# Patient Record
Sex: FEMALE | Race: WHITE | Employment: OTHER | URBAN - METROPOLITAN AREA
[De-identification: names, ages, dates, MRNs, and addresses within clinical notes are randomized per-mention and may not be internally consistent; named-entity substitution may affect disease eponyms.]

---

## 2018-05-16 ENCOUNTER — OFFICE VISIT (OUTPATIENT)
Dept: FAMILY MEDICINE CLINIC | Age: 58
End: 2018-05-16

## 2018-05-16 VITALS
WEIGHT: 287 LBS | TEMPERATURE: 98.1 F | BODY MASS INDEX: 45.04 KG/M2 | SYSTOLIC BLOOD PRESSURE: 138 MMHG | DIASTOLIC BLOOD PRESSURE: 83 MMHG | RESPIRATION RATE: 20 BRPM | OXYGEN SATURATION: 98 % | HEIGHT: 67 IN | HEART RATE: 72 BPM

## 2018-05-16 DIAGNOSIS — E66.01 OBESITY, MORBID (HCC): ICD-10-CM

## 2018-05-16 DIAGNOSIS — M19.90 ARTHRITIS: ICD-10-CM

## 2018-05-16 DIAGNOSIS — M62.830 MUSCLE SPASM OF BACK: ICD-10-CM

## 2018-05-16 DIAGNOSIS — Z00.00 HEALTH CARE MAINTENANCE: ICD-10-CM

## 2018-05-16 DIAGNOSIS — E55.9 HYPOVITAMINOSIS D: ICD-10-CM

## 2018-05-16 DIAGNOSIS — I10 HYPERTENSION GOAL BP (BLOOD PRESSURE) < 130/80: ICD-10-CM

## 2018-05-16 DIAGNOSIS — K21.9 GASTROESOPHAGEAL REFLUX DISEASE WITHOUT ESOPHAGITIS: ICD-10-CM

## 2018-05-16 DIAGNOSIS — E78.00 HYPERCHOLESTEROLEMIA: ICD-10-CM

## 2018-05-16 DIAGNOSIS — F41.9 ANXIETY AND DEPRESSION: ICD-10-CM

## 2018-05-16 DIAGNOSIS — R73.03 BORDERLINE DIABETES MELLITUS: ICD-10-CM

## 2018-05-16 DIAGNOSIS — R07.89 CHEST DISCOMFORT: ICD-10-CM

## 2018-05-16 DIAGNOSIS — F32.A ANXIETY AND DEPRESSION: ICD-10-CM

## 2018-05-16 DIAGNOSIS — Z13.29 SCREENING FOR THYROID DISORDER: ICD-10-CM

## 2018-05-16 DIAGNOSIS — Z11.59 NEED FOR HEPATITIS C SCREENING TEST: ICD-10-CM

## 2018-05-16 DIAGNOSIS — Z00.00 MEDICARE ANNUAL WELLNESS VISIT, INITIAL: Primary | ICD-10-CM

## 2018-05-16 RX ORDER — ROSUVASTATIN CALCIUM 5 MG/1
10 TABLET, COATED ORAL
Qty: 30 TAB | Refills: 3 | Status: SHIPPED | OUTPATIENT
Start: 2018-05-16 | End: 2018-07-06 | Stop reason: SDUPTHER

## 2018-05-16 RX ORDER — MELOXICAM 15 MG/1
15 TABLET ORAL DAILY
Qty: 30 TAB | Refills: 3 | Status: SHIPPED | OUTPATIENT
Start: 2018-05-16 | End: 2018-07-05 | Stop reason: SDUPTHER

## 2018-05-16 RX ORDER — OMEPRAZOLE 40 MG/1
40 CAPSULE, DELAYED RELEASE ORAL DAILY
Qty: 30 CAP | Refills: 3 | Status: SHIPPED | OUTPATIENT
Start: 2018-05-16 | End: 2018-08-02 | Stop reason: SDUPTHER

## 2018-05-16 RX ORDER — DICLOFENAC SODIUM 10 MG/G
2 GEL TOPICAL 4 TIMES DAILY
Qty: 2 G | Refills: 3 | Status: SHIPPED | OUTPATIENT
Start: 2018-05-16

## 2018-05-16 RX ORDER — LISINOPRIL 20 MG/1
20 TABLET ORAL DAILY
Qty: 30 TAB | Refills: 3 | Status: SHIPPED | OUTPATIENT
Start: 2018-05-16 | End: 2018-05-17

## 2018-05-16 RX ORDER — SERTRALINE HYDROCHLORIDE 50 MG/1
50 TABLET, FILM COATED ORAL DAILY
Qty: 30 TAB | Refills: 3 | Status: SHIPPED | OUTPATIENT
Start: 2018-05-16 | End: 2018-08-02 | Stop reason: SDUPTHER

## 2018-05-16 RX ORDER — SERTRALINE HYDROCHLORIDE 50 MG/1
TABLET, FILM COATED ORAL DAILY
COMMUNITY
End: 2018-05-16 | Stop reason: SDUPTHER

## 2018-05-16 RX ORDER — MELOXICAM 15 MG/1
15 TABLET ORAL DAILY
COMMUNITY
End: 2018-05-16 | Stop reason: SDUPTHER

## 2018-05-16 RX ORDER — TIZANIDINE 2 MG/1
TABLET ORAL
Qty: 20 TAB | Refills: 1 | Status: SHIPPED | OUTPATIENT
Start: 2018-05-16 | End: 2018-07-02 | Stop reason: SDUPTHER

## 2018-05-16 NOTE — MR AVS SNAPSHOT
Maria Dewitt [address] [facility] 
[phone] Patient: Phil Hannah MRN: [id number] :1960 Visit Information Date & Time Provider Department Dept. Phone Encounter #  
 2018 12:00 PM Amanda Chacko MD [facility] [id number] Follow-up Instructions Return 2-3 weeks, for follw up resulkts. Upcoming Health Maintenance Date Due Hepatitis C Screening 1960 Pneumococcal 19-64 Highest Risk (1 of 3 - PCV13) 1980 DTaP/Tdap/Td series (1 - Tdap) 1982 FOBT Q 1 YEAR AGE 50-75 2011 BREAST CANCER SCRN MAMMOGRAM 2011 MEDICARE YEARLY EXAM 3/28/2018 Influenza Age 5 to Adult 2018 PAP AKA CERVICAL CYTOLOGY 2019 Allergies as of 2018  Review Complete On: 2018 By: Amanda Chacko MD  
  
 Severity Noted Reaction Type Reactions Pcn [Penicillins] High 2010    Other (comments) \"pass out\" Current Immunizations  Never Reviewed No immunizations on file. Not reviewed this visit You Were Diagnosed With   
  
 Codes Comments Medicare annual wellness visit, initial    -  Primary ICD-10-CM: Z00.00 ICD-9-CM: V70.0 Hypovitaminosis D     ICD-10-CM: E55.9 ICD-9-CM: 268.9 Obesity, morbid ([facility] [address]     ICD-10-CM: E66.01 
ICD-9-CM: 278.01 Health care maintenance     ICD-10-CM: Z00.00 ICD-9-CM: V70.0 Gastroesophageal reflux disease without esophagitis     ICD-10-CM: K21.9 ICD-9-CM: 530.81 Hypercholesterolemia     ICD-10-CM: E78.00 ICD-9-CM: 272.0 Borderline diabetes mellitus     ICD-10-CM: R73.03 
ICD-9-CM: 790.29 Screening for thyroid disorder     ICD-10-CM: Z13.29 ICD-9-CM: V77.0 Need for hepatitis C screening test     ICD-10-CM: Z11.59 
ICD-9-CM: V73.89 Chest discomfort     ICD-10-CM: R07.89 ICD-9-CM: 786.59  Hypertension goal BP (blood pressure) < 130/80     ICD-10-CM: I10 
 ICD-9-CM: 401.9 Anxiety and depression     ICD-10-CM: F41.9, F32.9 ICD-9-CM: 300.00, 311 Arthritis     ICD-10-CM: M19.90 ICD-9-CM: 716.90 Vitals BP Pulse Temp Resp Height(growth percentile) Weight(growth percentile) 138/83 72 98.1 °F (36.7 °C) (Oral) 20 5' 7\" (1.702 m) 287 lb (130.2 kg) SpO2 BMI OB Status Smoking Status 98% 44.95 kg/m2 Menopause Never Smoker Vitals History BMI and BSA Data Body Mass Index Body Surface Area 44.95 kg/m 2 2.48 m 2 Preferred Pharmacy Pharm[facility] Name Phone [phone] Your Updated Medication List  
  
   
This list is accurate as of 5/16/18  1:20 PM.  Always use your most recent med list.  
  
  
  
  
 diclofenac 1 % Gel Commonly known as:  VOLTAREN Apply 2 g to affected area four (4) times daily. lisinopril 20 mg tablet Commonly known as:  Adela Laoz Take 1 Tab by mouth daily. meloxicam 15 mg tablet Commonly known as:  MOBIC Take 1 Tab by mouth daily. omeprazole 40 mg capsule Commonly known as:  PRILOSEC Take 1 Cap by mouth daily. rosuvastatin 5 mg tablet Commonly known as:  CRESTOR Take 2 Tabs by mouth nightly. sertraline 50 mg tablet Commonly known as:  ZOLOFT Take 1 Tab by mouth daily. Prescriptions Sent to Pharmacy Refills  
 sertraline (ZOLOFT) 50 mg tablet 3 Sig: Take 1 Tab by mouth daily. Class: Normal  
 Pharmacy: [facility] [address] XAVIER [address] [place] [address], [address] [place] [address] Ph #: [phone] Route: Oral  
 meloxicam (MOBIC) 15 mg tablet 3 Sig: Take 1 Tab by mouth daily. Class: Normal  
 [facility] [address] [place] [address], [address] [place] [address] Ph #: [phone] Route: Oral  
 diclofenac (VOLTAREN) 1 % gel 3 Sig: Apply 2 g to affected area four (4) times daily.   
 Class: Normal  
 Pharmacy: [address] [place] [address], [address] [place] [address] Ph #: [phone] Route: Topical  
 lisinopril (PRINIVIL, ZESTRIL) 20 mg tablet 3 Sig: Take 1 Tab by mouth daily. Class: Normal  
 Pharmacy: [address] [place] [address], [address] [place] [address] Ph #: [phone] Route: Oral  
 rosuvastatin (CRESTOR) 5 mg tablet 3 Sig: Take 2 Tabs by mouth nightly. Class: Normal  
 Pharmacy: [address] [place] [address], [address] [place] [address] Ph #: [phone] Route: Oral  
 omeprazole (PRILOSEC) 40 mg capsule 3 Sig: Take 1 Cap by mouth daily. Class: Normal  
 Pharmacy: [address] [place] [address], [address] [place] [address] Ph #: [phone] Route: Oral  
  
We Performed the Following AMB POC EKG ROUTINE W/ 12 LEADS, SCREEN () [ [facility]S] CBC WITH AUTOMATED DIFF [37487 CPT(R)] HEMOGLOBIN A1C WITH EAG [96014 CPT(R)] HEPATITIS C AB [88155 CPT(R)] LIPID PANEL [93696 CPT(R)] METABOLIC PANEL, COMPREHENSIVE [79077 CPT(R)] REFERRAL TO CARDIOLOGY [NPW14 Custom] Comments:  
 Recurrent chest discomfort with normal EKG. I have placed an order for stress test  
 REFERRAL TO GASTROENTEROLOGY [CCY86 Custom] TSH 3RD GENERATION [64784 CPT(R)] URINALYSIS W/ RFLX MICROSCOPIC [91089 CPT(R)] VITAMIN D, 25 HYDROXY [id number] CPT(R)] Follow-up Instructions Return 2-3 weeks, for follw up resulkts. To-Do List   
 05/16/2018 ECHO:  ECHO TTE STRESS COMP W OR WO CONTR   
  
 05/16/2018 ECHO:  ECHO TTE STRESS EXERCISE TREADMILL COMP   
  
 12/16/2018 Imaging:  ISIDRO MAMMO BI SCREENING INCL CAD Referral Information Referral ID Referred By Referred To  
  
 [id number] [facility]   
   [address] [place], [place] [address]ise [facility] [address] [facility], [address] Phone: [phone] Fax: [phone] Visits Status Start Date End Date 1 Authorized 5/16/18 5/16/19 If your referral has a status of pending review or denied, additional information will be sent to support the outcome of this decision. Referral ID Referred By Ref[facility] To  
 [id number]  
   [address] [facility] [address] [place] [address] Ne, [address] Phone: [phone] Fax: [phone] Visits Status Start Date End Date 1 Authorized 5/16/18 5/16/19 If your referral has a status of pending review or denied, additional information will be sent to support the outcome of this decision. Patient Instructions Well Visit, Women 48 to 72: Care Instructions Your Care Instructions Physical exams can help you stay healthy. Your doctor has checked your overall health and may have suggested ways to take good care of yourself. He or she also may have recommended tests. At home, you can help prevent illness with healthy eating, regular exercise, and other steps. Follow-up care is a key part of your treatment and safety. Be sure to make and go to all appointments, and call your doctor if you are having problems. It's also a good idea to know your test results and keep a list of the medicines you take. How can you care for yourself at home? · Reach and stay at a healthy weight. This will lower your risk for many problems, such as obesity, diabetes, heart disease, and high blood pressure. · Get at least 30 minutes of exercise on most days of the week. Walking is a good choice. You also may want to do other activities, such as running, swimming, cycling, or playing tennis or team sports. · Do not smoke. Smoking can make health problems worse. If you need help quitting, talk to your doctor about stop-smoking programs and medicines. These can increase your chances of quitting for good. · Protect your skin from too much sun.  When you're outdoors from 10 a.m. to 4 p.m., stay in the shade or cover up with clothing and a hat with a wide brim. Wear sunglasses that block UV rays. Even when it's cloudy, put broad-spectrum sunscreen (SPF 30 or higher) on any exposed skin. · See a dentist one or two times a year for checkups and to have your teeth cleaned. · Wear a seat belt in the car. · Limit alcohol to 1 drink a day. Too much alcohol can cause health problems. Follow your doctor's advice about when to have certain tests. These tests can spot problems early. · Cholesterol. Your doctor will tell you how often to have this done based on your age, family history, or other things that can increase your risk for heart attack and stroke. · Blood pressure. Have your blood pressure checked during a routine doctor visit. Your doctor will tell you how often to check your blood pressure based on your age, your blood pressure results, and other factors. · Mammogram. Ask your doctor how often you should have a mammogram, which is an X-ray of your breasts. A mammogram can spot breast cancer before it can be felt and when it is easiest to treat. · Pap test and pelvic exam. Ask your doctor how often you should have a Pap test. You may not need to have a Pap test as often as you used to. · Vision. Have your eyes checked every year or two or as often as your doctor suggests. Some experts recommend that you have yearly exams for glaucoma and other age-related eye problems starting at age 48. · Hearing. Tell your doctor if you notice any change in your hearing. You can have tests to find out how well you hear. · Diabetes. Ask your doctor whether you should have tests for diabetes. · Colon cancer. You should begin tests for colon cancer at age 48. You may have one of several tests. Your doctor will tell you how often to have tests based on your age and risk. Risks include whether you already had a precancerous polyp removed from your colon or whether your parents, sisters and brothers, or children have had colon cancer. · Thyroid disease. Talk to your doctor about whether to have your thyroid checked as part of a regular physical exam. Women have an increased chance of a thyroid problem. · Osteoporosis. You should begin tests for bone density at age 72. If you are younger than 72, ask your doctor whether you have factors that may increase your risk for this disease. You may want to have this test before age 72. · Heart attack and stroke risk. At least every 4 to 6 years, you should have your risk for heart attack and stroke assessed. Your doctor uses factors such as your age, blood pressure, cholesterol, and whether you smoke or have diabetes to show what your risk for a heart attack or stroke is over the next 10 years. When should you call for help? Watch closely for changes in your health, and be sure to contact your doctor if you have any problems or symptoms that concern you. Where can you learn more? Go to http://dani-benson.info/. Enter F332 in the search box to learn more about \"Well Visit, Women 50 to 72: Care Instructions. \" Current as of: May 12, 2017 Content Version: 11.4 © 9573-4632 [employer]. Care instructions adapted under license by [employer] (which disclaims liability or warranty for this information). If you have questions about a medical condition or this instruction, always ask your healthcare professional. [place] [address] any warranty or liability for your use of this information. Introducing [facility]! Dear Christianna Carrel: 
Thank you for requesting a [employer] account. Our records indicate that you have previously registered for a [employer] account but its currently inactive. Please call our [employer] support line at [phone]. Additional Information If you have questions, please visit the Frequently Asked Questions section of the [employer] website at [email or web address] Remember, MyChart is NOT to be used for urgent needs. For medical emergencies, dial 911. Now available from your iPhone and Android! Please provide this summary of care documentation to your next provider. Your primary care clinician is listed as Olamide Wilson. If you have any questions after today's visit, please call [phone].

## 2018-05-16 NOTE — PATIENT INSTRUCTIONS
Well Visit, Women 48 to 72: Care Instructions  Your Care Instructions    Physical exams can help you stay healthy. Your doctor has checked your overall health and may have suggested ways to take good care of yourself. He or she also may have recommended tests. At home, you can help prevent illness with healthy eating, regular exercise, and other steps. Follow-up care is a key part of your treatment and safety. Be sure to make and go to all appointments, and call your doctor if you are having problems. It's also a good idea to know your test results and keep a list of the medicines you take. How can you care for yourself at home? · Reach and stay at a healthy weight. This will lower your risk for many problems, such as obesity, diabetes, heart disease, and high blood pressure. · Get at least 30 minutes of exercise on most days of the week. Walking is a good choice. You also may want to do other activities, such as running, swimming, cycling, or playing tennis or team sports. · Do not smoke. Smoking can make health problems worse. If you need help quitting, talk to your doctor about stop-smoking programs and medicines. These can increase your chances of quitting for good. · Protect your skin from too much sun. When you're outdoors from 10 a.m. to 4 p.m., stay in the shade or cover up with clothing and a hat with a wide brim. Wear sunglasses that block UV rays. Even when it's cloudy, put broad-spectrum sunscreen (SPF 30 or higher) on any exposed skin. · See a dentist one or two times a year for checkups and to have your teeth cleaned. · Wear a seat belt in the car. · Limit alcohol to 1 drink a day. Too much alcohol can cause health problems. Follow your doctor's advice about when to have certain tests. These tests can spot problems early. · Cholesterol.  Your doctor will tell you how often to have this done based on your age, family history, or other things that can increase your risk for heart attack and stroke. · Blood pressure. Have your blood pressure checked during a routine doctor visit. Your doctor will tell you how often to check your blood pressure based on your age, your blood pressure results, and other factors. · Mammogram. Ask your doctor how often you should have a mammogram, which is an X-ray of your breasts. A mammogram can spot breast cancer before it can be felt and when it is easiest to treat. · Pap test and pelvic exam. Ask your doctor how often you should have a Pap test. You may not need to have a Pap test as often as you used to. · Vision. Have your eyes checked every year or two or as often as your doctor suggests. Some experts recommend that you have yearly exams for glaucoma and other age-related eye problems starting at age 48. · Hearing. Tell your doctor if you notice any change in your hearing. You can have tests to find out how well you hear. · Diabetes. Ask your doctor whether you should have tests for diabetes. · Colon cancer. You should begin tests for colon cancer at age 48. You may have one of several tests. Your doctor will tell you how often to have tests based on your age and risk. Risks include whether you already had a precancerous polyp removed from your colon or whether your parents, sisters and brothers, or children have had colon cancer. · Thyroid disease. Talk to your doctor about whether to have your thyroid checked as part of a regular physical exam. Women have an increased chance of a thyroid problem. · Osteoporosis. You should begin tests for bone density at age 72. If you are younger than 72, ask your doctor whether you have factors that may increase your risk for this disease. You may want to have this test before age 72. · Heart attack and stroke risk. At least every 4 to 6 years, you should have your risk for heart attack and stroke assessed.  Your doctor uses factors such as your age, blood pressure, cholesterol, and whether you smoke or have diabetes to show what your risk for a heart attack or stroke is over the next 10 years. When should you call for help? Watch closely for changes in your health, and be sure to contact your doctor if you have any problems or symptoms that concern you. Where can you learn more? Go to http://dani-benson.info/. Enter P830 in the search box to learn more about \"Well Visit, Women 50 to 72: Care Instructions. \"  Current as of: May 12, 2017  Content Version: 11.4  © 9051-4924 [employer]. Care instructions adapted under license by [employer] (which disclaims liability or warranty for this information). If you have questions about a medical condition or this instruction, always ask your healthcare professional. [address] any warranty or liability for your use of this information.

## 2018-05-16 NOTE — PROGRESS NOTES
Chief Complaint   Patient presents with   [employer] Establish Care     medicare wellness exam     HPI:  This is an Initial Medicare Annual Wellness Exam (AWV) (Performed 12 months after IPPE or effective date of Medicare Part B enrollment, Once in a lifetime)  I have reviewed the patient's medical history in detail and updated the computerized patient record. History     Past Medical History:   Diagnosis Date    Cancer [facility]) 1986    cervical    GERD (gastroesophageal reflux disease)     Hypertension     Ill-defined condition     Fibromyalgia      Past Surgical History:   Procedure Laterality Date    HX GYN      cone resection     Current Outpatient Prescriptions   Medication Sig Dispense Refill    sertraline (ZOLOFT) 50 mg tablet Take  by mouth daily.  meloxicam (MOBIC) 15 mg tablet Take 15 mg by mouth daily.  diclofenac (VOLTAREN) 1 % gel Apply 2 g to affected area four (4) times daily.  lisinopril (PRINIVIL, ZESTRIL) 20 mg tablet 20 mg daily.  CRESTOR 5 mg tablet 10 mg nightly.  OMEPRAZOLE PO Take 40 mg by mouth daily. Allergies   Allergen Reactions    Pcn [Penicillins] Other (comments)     \"pass out\"     Family History   Problem Relation Age of Onset    Hypertension Mother     Heart Disease Father     Other Sister      FMS     Social History   Substance Use Topics    Smoking status: Never Smoker    Smokeless tobacco: Never Used    Alcohol use 1.5 oz/week     3 Cans of beer per week      Comment: socially     Patient Active Problem List   Diagnosis Code    Cancer ([facility] C80.1    Plantar fasciitis M72.2    Hypovitaminosis D E55.9    Obesity, morbid ([facility] E66.01    Gastroesophageal reflux disease without esophagitis K21.9       Depression Risk Factor Screening:   No flowsheet data found. Alcohol Risk Factor Screening: You do not drink alcohol or very rarely. Functional Ability and Level of Safety:     Hearing Loss  Hearing is good.     Activities of Daily Living  The home contains: no safety equipment. Patient does total self care    Fall Risk  No flowsheet data found. Abuse Screen  Patient is not abused    Cognitive Screening   Evaluation of Cognitive Function:  Has your family/caregiver stated any concerns about your memory: no  Normal    Patient Care Team   Patient Care Team:  None as PCP - General    Assessment/Plan   Education and counseling provided:  Are appropriate based on today's review and evaluation  Screening Mammography  Cardiovascular screening blood test  Diabetes screening test  Diagnoses and all orders for this visit:    Medicare annual wellness visit, initial  -     URINALYSIS W/ RFLX MICROSCOPIC  -     METABOLIC PANEL, COMPREHENSIVE  -     CBC WITH AUTOMATED DIFF  -     AMB POC EKG ROUTINE W/ 12 LEADS, SCREEN ()    Hypovitaminosis D  -     VITAMIN D, 25 HYDROXY    Obesity, morbid ([address]  -     LIPID PANEL    Health care maintenance  -     [facility] MAMMO BI SCREENING INCL CAD; Future    Gastroesophageal reflux disease without esophagitis  -     omeprazole (PRILOSEC) 40 mg capsule; Take 1 Cap by mouth daily. , Normal, Disp-30 Cap, R-3    Hypercholesterolemia  -     LIPID PANEL  -     rosuvastatin (CRESTOR) 5 mg tablet; Take 2 Tabs by mouth nightly., Normal, Disp-30 Tab, R-3    Borderline diabetes mellitus  -     METABOLIC PANEL, COMPREHENSIVE  -     HEMOGLOBIN A1C WITH EAG    Screening for thyroid disorder  -     TSH 3RD GENERATION  -     [facility]    Need for hepatitis C screening test  -     HEPATITIS C AB    Chest discomfort  -     ECHO TTE STRESS EXERCISE TREADMILL COMP; Future  -     ECHO TTE STRESS EXRCSE COMP W OR WO CONTR; Future  -     [facility]    Hypertension goal BP (blood pressure) < 130/80  -     lisinopril (PRINIVIL, ZESTRIL) 20 mg tablet; Take 1 Tab by mouth daily. , Normal, Disp-30 Tab, R-3    Anxiety and depression  -     sertraline (ZOLOFT) 50 mg tablet; Take 1 Tab by mouth daily. , Normal, Disp-30 Tab, R-3    Arthritis  - meloxicam (MOBIC) 15 mg tablet; Take 1 Tab by mouth daily. , Normal, Disp-30 Tab, R-3  -     diclofenac (VOLTAREN) 1 % gel; Apply 2 g to affected area four (4) times daily. , Normal, Disp-2 g, R-3    Muscle spasm of back  -     tiZANidine (ZANAFLEX) 2 mg tablet; 1 tab every 8 hours as needed, Normal, Disp-20 Tab, R-1      Patient Instructions        Well Visit, Women 50 to 72: Care Instructions  Your Care Instructions    Physical exams can help you stay healthy. Your doctor has checked your overall health and may have suggested ways to take good care of yourself. He or she also may have recommended tests. At home, you can help prevent illness with healthy eating, regular exercise, and other steps. Follow-up care is a key part of your treatment and safety. Be sure to make and go to all appointments, and call your doctor if you are having problems. It's also a good idea to know your test results and keep a list of the medicines you take. How can you care for yourself at home? · Reach and stay at a healthy weight. This will lower your risk for many problems, such as obesity, diabetes, heart disease, and high blood pressure. · Get at least 30 minutes of exercise on most days of the week. Walking is a good choice. You also may want to do other activities, such as running, swimming, cycling, or playing tennis or team sports. · Do not smoke. Smoking can make health problems worse. If you need help quitting, talk to your doctor about stop-smoking programs and medicines. These can increase your chances of quitting for good. · Protect your skin from too much sun. When you're outdoors from 10 a.m. to 4 p.m., stay in the shade or cover up with clothing and a hat with a wide brim. Wear sunglasses that block UV rays. Even when it's cloudy, put broad-spectrum sunscreen (SPF 30 or higher) on any exposed skin. · See a dentist one or two times a year for checkups and to have your teeth cleaned.   · Wear a seat belt in the car.  · Limit alcohol to 1 drink a day. Too much alcohol can cause health problems. Follow your doctor's advice about when to have certain tests. These tests can spot problems early. · Cholesterol. Your doctor will tell you how often to have this done based on your age, family history, or other things that can increase your risk for heart attack and stroke. · Blood pressure. Have your blood pressure checked during a routine doctor visit. Your doctor will tell you how often to check your blood pressure based on your age, your blood pressure results, and other factors. · Mammogram. Ask your doctor how often you should have a mammogram, which is an X-ray of your breasts. A mammogram can spot breast cancer before it can be felt and when it is easiest to treat. · Pap test and pelvic exam. Ask your doctor how often you should have a Pap test. You may not need to have a Pap test as often as you used to. · Vision. Have your eyes checked every year or two or as often as your doctor suggests. Some experts recommend that you have yearly exams for glaucoma and other age-related eye problems starting at age 48. · Hearing. Tell your doctor if you notice any change in your hearing. You can have tests to find out how well you hear. · Diabetes. Ask your doctor whether you should have tests for diabetes. · Colon cancer. You should begin tests for colon cancer at age 48. You may have one of several tests. Your doctor will tell you how often to have tests based on your age and risk. Risks include whether you already had a precancerous polyp removed from your colon or whether your parents, sisters and brothers, or children have had colon cancer. · Thyroid disease. Talk to your doctor about whether to have your thyroid checked as part of a regular physical exam. Women have an increased chance of a thyroid problem. · Osteoporosis. You should begin tests for bone density at age 72.  If you are younger than 72, ask your doctor whether you have factors that may increase your risk for this disease. You may want to have this test before age 72. · Heart attack and stroke risk. At least every 4 to 6 years, you should have your risk for heart attack and stroke assessed. Your doctor uses factors such as your age, blood pressure, cholesterol, and whether you smoke or have diabetes to show what your risk for a heart attack or stroke is over the next 10 years. When should you call for help? Watch closely for changes in your health, and be sure to contact your doctor if you have any problems or symptoms that concern you. Where can you learn more? Go to http://dani-benson.info/. Enter L208 in the search box to learn more about \"Well Visit, Women 50 to 72: Care Instructions. \"  Current as of: May 12, 2017  Content Version: 11.4  © 5970-3848 Health[employer], [employer]. Care instructions adapted under license by [employer] (which disclaims liability or warranty for this information). If you have questions about a medical condition or this instruction, always ask your healthcare professional. [place] [address] any warranty or liability for your use of this information. Follow-up Disposition:  Return 2-3 weeks, for follw up resulkts.

## 2018-05-17 ENCOUNTER — TELEPHONE (OUTPATIENT)
Dept: FAMILY MEDICINE CLINIC | Age: 58
End: 2018-05-17

## 2018-05-17 LAB
25(OH)D3+25(OH)D2 SERPL-MCNC: 20.3 NG/ML (ref 30–100)
ALBUMIN SERPL-MCNC: 4.5 G/DL (ref 3.5–5.5)
ALBUMIN/GLOB SERPL: 1.6 {RATIO} (ref 1.2–2.2)
ALP SERPL-CCNC: 102 IU/L (ref 39–117)
ALT SERPL-CCNC: 17 IU/L (ref 0–32)
APPEARANCE UR: CLEAR
AST SERPL-CCNC: 21 IU/L (ref 0–40)
BASOPHILS # BLD AUTO: 0 X10E3/UL (ref 0–0.2)
BASOPHILS NFR BLD AUTO: 1 %
BILIRUB SERPL-MCNC: 0.4 MG/DL (ref 0–1.2)
BILIRUB UR QL STRIP: NEGATIVE
BUN SERPL-MCNC: 19 MG/DL (ref 6–24)
BUN/CREAT SERPL: 19 (ref 9–23)
CALCIUM SERPL-MCNC: 9.6 MG/DL (ref 8.7–10.2)
CHLORIDE SERPL-SCNC: 100 MMOL/L (ref 96–106)
CHOLEST SERPL-MCNC: 195 MG/DL (ref 100–199)
CO2 SERPL-SCNC: 27 MMOL/L (ref 18–29)
COLOR UR: YELLOW
CREAT SERPL-MCNC: 0.99 MG/DL (ref 0.57–1)
EOSINOPHIL # BLD AUTO: 0.1 X10E3/UL (ref 0–0.4)
EOSINOPHIL NFR BLD AUTO: 2 %
ERYTHROCYTE [DISTWIDTH] IN BLOOD BY AUTOMATED COUNT: 16 % (ref 12.3–15.4)
EST. AVERAGE GLUCOSE BLD GHB EST-MCNC: 117 MG/DL
GFR SERPLBLD CREATININE-BSD FMLA CKD-EPI: 63 ML/MIN/1.73
GFR SERPLBLD CREATININE-BSD FMLA CKD-EPI: 73 ML/MIN/1.73
GLOBULIN SER CALC-MCNC: 2.9 G/DL (ref 1.5–4.5)
GLUCOSE SERPL-MCNC: 88 MG/DL (ref 65–99)
GLUCOSE UR QL: NEGATIVE
HBA1C MFR BLD: 5.7 % (ref 4.8–5.6)
HCT VFR BLD AUTO: 39.2 % (ref 34–46.6)
HCV AB S/CO SERPL IA: <0.1 S/CO RATIO (ref 0–0.9)
HDLC SERPL-MCNC: 81 MG/DL
HGB BLD-MCNC: 12.7 G/DL (ref 11.1–15.9)
HGB UR QL STRIP: NEGATIVE
IMM GRANULOCYTES # BLD: 0 X10E3/UL (ref 0–0.1)
IMM GRANULOCYTES NFR BLD: 0 %
KETONES UR QL STRIP: NEGATIVE
LDLC SERPL CALC-MCNC: 93 MG/DL (ref 0–99)
LEUKOCYTE ESTERASE UR QL STRIP: NEGATIVE
LYMPHOCYTES # BLD AUTO: 1.6 X10E3/UL (ref 0.7–3.1)
LYMPHOCYTES NFR BLD AUTO: 27 %
MCH RBC QN AUTO: 27.8 PG (ref 26.6–33)
MCHC RBC AUTO-ENTMCNC: 32.4 G/DL (ref 31.5–35.7)
MCV RBC AUTO: 86 FL (ref 79–97)
MICRO URNS: NORMAL
MONOCYTES # BLD AUTO: 0.6 X10E3/UL (ref 0.1–0.9)
MONOCYTES NFR BLD AUTO: 10 %
NEUTROPHILS # BLD AUTO: 3.6 X10E3/UL (ref 1.4–7)
NEUTROPHILS NFR BLD AUTO: 60 %
NITRITE UR QL STRIP: NEGATIVE
PH UR STRIP: 7.5 [PH] (ref 5–7.5)
PLATELET # BLD AUTO: 287 X10E3/UL (ref 150–379)
POTASSIUM SERPL-SCNC: 4.1 MMOL/L (ref 3.5–5.2)
PROT SERPL-MCNC: 7.4 G/DL (ref 6–8.5)
PROT UR QL STRIP: NEGATIVE
RBC # BLD AUTO: 4.57 X10E6/UL (ref 3.77–5.28)
SODIUM SERPL-SCNC: 144 MMOL/L (ref 134–144)
SP GR UR: 1.02 (ref 1–1.03)
TRIGL SERPL-MCNC: 104 MG/DL (ref 0–149)
TSH SERPL DL<=0.005 MIU/L-ACNC: 2.47 UIU/ML (ref 0.45–4.5)
UROBILINOGEN UR STRIP-MCNC: 0.2 MG/DL (ref 0.2–1)
VLDLC SERPL CALC-MCNC: 21 MG/DL (ref 5–40)
WBC # BLD AUTO: 6 X10E3/UL (ref 3.4–10.8)

## 2018-05-17 NOTE — TELEPHONE ENCOUNTER
----- Message from Latrice Siddiqui sent at 5/17/2018  9:41 AM EDT -----  Regarding: Dr Bonilla/telephone  Pt said the wrong Lisinopril dosage was send over, it is supposed to be Lisinopril/HCTZ 20/25mg  into [facility], pt can be reach at [phone].

## 2018-05-17 NOTE — TELEPHONE ENCOUNTER
Pls call Janette Hands  She needs more info b/f scheduling pt visit       Ed Siegel  [address] [place] [address]  Phone: [phone]    ext 160  Fax: [phone]

## 2018-05-18 RX ORDER — LISINOPRIL AND HYDROCHLOROTHIAZIDE 12.5; 2 MG/1; MG/1
1 TABLET ORAL DAILY
Qty: 90 TAB | Refills: 1 | Status: SHIPPED | OUTPATIENT
Start: 2018-05-18 | End: 2018-07-05 | Stop reason: CLARIF

## 2018-05-21 ENCOUNTER — DOCUMENTATION ONLY (OUTPATIENT)
Dept: FAMILY MEDICINE CLINIC | Age: 58
End: 2018-05-21

## 2018-06-25 DIAGNOSIS — E55.9 HYPOVITAMINOSIS D: Primary | ICD-10-CM

## 2018-06-25 RX ORDER — CHOLECALCIFEROL TAB 125 MCG (5000 UNIT) 125 MCG
5000 TAB ORAL DAILY
Qty: 90 TAB | Refills: 1 | Status: SHIPPED | OUTPATIENT
Start: 2018-06-25 | End: 2018-07-05 | Stop reason: SDUPTHER

## 2018-06-25 RX ORDER — CHOLECALCIFEROL TAB 125 MCG (5000 UNIT) 125 MCG
5000 TAB ORAL DAILY
Qty: 90 TAB | Refills: 1 | Status: SHIPPED | OUTPATIENT
Start: 2018-06-25 | End: 2018-08-02 | Stop reason: SDUPTHER

## 2018-06-26 ENCOUNTER — HOSPITAL ENCOUNTER (OUTPATIENT)
Dept: MAMMOGRAPHY | Age: 58
Discharge: HOME | End: 2018-06-26
Attending: INTERNAL MEDICINE
Payer: MEDICARE

## 2018-06-26 ENCOUNTER — DOCUMENTATION ONLY (OUTPATIENT)
Dept: FAMILY MEDICINE CLINIC | Age: 58
End: 2018-06-26

## 2018-06-26 DIAGNOSIS — Z12.31 VISIT FOR SCREENING MAMMOGRAM: ICD-10-CM

## 2018-06-26 PROCEDURE — 77067 SCR MAMMO BI INCL CAD: CPT

## 2018-06-27 NOTE — TELEPHONE ENCOUNTER
The correct lisinopril/hctz have been sent to the pharmacy per . hard copy, drawn during this pregnancy

## 2018-07-02 DIAGNOSIS — M62.830 MUSCLE SPASM OF BACK: ICD-10-CM

## 2018-07-02 RX ORDER — TIZANIDINE 2 MG/1
TABLET ORAL
Qty: 20 TAB | Refills: 1 | Status: SHIPPED | OUTPATIENT
Start: 2018-07-02 | End: 2018-08-02 | Stop reason: SDUPTHER

## 2018-07-03 DIAGNOSIS — M19.90 ARTHRITIS: ICD-10-CM

## 2018-07-03 DIAGNOSIS — E78.00 HYPERCHOLESTEROLEMIA: ICD-10-CM

## 2018-07-05 ENCOUNTER — OFFICE VISIT (OUTPATIENT)
Dept: CARDIOLOGY CLINIC | Age: 58
End: 2018-07-05

## 2018-07-05 VITALS
DIASTOLIC BLOOD PRESSURE: 90 MMHG | SYSTOLIC BLOOD PRESSURE: 122 MMHG | HEART RATE: 97 BPM | OXYGEN SATURATION: 98 % | BODY MASS INDEX: 45.12 KG/M2 | WEIGHT: 287.5 LBS | HEIGHT: 67 IN | RESPIRATION RATE: 16 BRPM

## 2018-07-05 DIAGNOSIS — K21.9 GASTROESOPHAGEAL REFLUX DISEASE WITHOUT ESOPHAGITIS: ICD-10-CM

## 2018-07-05 DIAGNOSIS — I10 ESSENTIAL HYPERTENSION: ICD-10-CM

## 2018-07-05 DIAGNOSIS — Z76.89 ENCOUNTER TO ESTABLISH CARE: ICD-10-CM

## 2018-07-05 DIAGNOSIS — M72.2 PLANTAR FASCIITIS: ICD-10-CM

## 2018-07-05 DIAGNOSIS — E66.01 OBESITY, MORBID (HCC): ICD-10-CM

## 2018-07-05 DIAGNOSIS — R07.89 CHEST TIGHTNESS: Primary | ICD-10-CM

## 2018-07-05 DIAGNOSIS — E78.5 HYPERLIPIDEMIA, UNSPECIFIED HYPERLIPIDEMIA TYPE: ICD-10-CM

## 2018-07-05 RX ORDER — ROSUVASTATIN CALCIUM 20 MG/1
20 TABLET, COATED ORAL DAILY
COMMUNITY
Start: 2018-06-07 | End: 2018-07-05 | Stop reason: SDUPTHER

## 2018-07-05 RX ORDER — ASPIRIN 81 MG/1
81 TABLET ORAL DAILY
Qty: 30 TAB | Refills: 11
Start: 2018-07-05 | End: 2019-02-12

## 2018-07-05 RX ORDER — METOPROLOL TARTRATE 25 MG/1
12.5 TABLET, FILM COATED ORAL 2 TIMES DAILY
Qty: 90 TAB | Refills: 1 | Status: SHIPPED | OUTPATIENT
Start: 2018-07-05 | End: 2018-07-23

## 2018-07-05 RX ORDER — LISINOPRIL AND HYDROCHLOROTHIAZIDE 20; 25 MG/1; MG/1
TABLET ORAL
COMMUNITY
Start: 2018-06-22 | End: 2018-08-02 | Stop reason: SDUPTHER

## 2018-07-05 NOTE — MR AVS SNAPSHOT
[address] [facility] 
[phone] Patient: Esmer Miguel MRN: [id number] :1960 Visit Information Date & Time Provider Department Dept. Phone Encounter #  
 2018 10:30 AM Stefanie Thornton, [address] [facility] [phone] [id number] Follow-up Instructions Routing History Follow-up and Disposition History Your Appointments 2018 12:30 PM  
ECHO CARDIOGRAMS 2D with [address]  [facility] [address]) Appt Note: per Dr. Rina Corado, 2day rahul, [phone] waiting on order  echo b4 NUC  [address] [address] [facility]  
[phone] [address] [facility] [address] [address]  
  
    
 2018  1:30 PM  
NUCLEAR MEDICINE with NUCLEAR, [facility] [address]) Appt Note: per Dr. Rina Corado, 2day rahul, 9911 0053 waiting on order  echo b4 NUC  [address] [address] [facility]  
[phone] [address] [place] [facility] Upcoming Health Maintenance Date Due Pneumococcal 19-64 Highest Risk (1 of 3 - PCV13) 1980 DTaP/Tdap/Td series (1 - Tdap) 1982 FOBT Q 1 YEAR AGE 50-75 2011 Influenza Age 5 to Adult 2018 PAP AKA CERVICAL CYTOLOGY 2019 MEDICARE YEARLY EXAM 2019 BREAST CANCER SCRN MAMMOGRAM 2020 Allergies as of 2018  Review Complete On: 2018 By: Stefanie Thornton MD  
  
 Severity Noted Reaction Type Reactions Pcn [Penicillins] High 2010    Other (comments) \"pass out\" Current Immunizations  Never Reviewed No immunizations on file. Not reviewed this visit You Were Diagnosed With   
  
 Codes Comments Chest tightness    -  Primary ICD-10-CM: R07.89 ICD-9-CM: 786.59 Hyperlipidemia, unspecified hyperlipidemia type     ICD-10-CM: E78.5 ICD-9-CM: 272.4 Gastroesophageal reflux disease without esophagitis     ICD-10-CM: K21.9 ICD-9-CM: 530.81 Obesity, morbid ([facility]     ICD-10-CM: E66.01 
ICD-9-CM: 278.01 Essential hypertension     ICD-10-CM: I10 
ICD-9-CM: 401.9 Encounter to establish care     ICD-10-CM: Z76.89 
ICD-9-CM: V65.8 Vitals BP Pulse Resp Height(growth percentile) Weight(growth percentile) SpO2  
 122/90 (BP 1 Location: Right arm, BP Patient Position: Sitting) 97 16 5' 7\" (1.702 m) 287 lb 8 oz (130.4 kg) 98% BMI OB Status Smoking Status 45.03 kg/m2 Menopause Never Smoker Vitals History BMI and BSA Data Body Mass Index Body Surface Area 45.03 kg/m 2 2.48 m 2 Preferred Pharmacy Pharmacy Name Phone [address] [address], [place] [phone] Your Updated Medication List  
  
   
This list is accurate as of 7/5/18 12:01 PM.  Always use your most recent med list.  
  
  
  
  
 aspirin delayed-release 81 mg tablet Take 1 Tab by mouth daily. cholecalciferol (VITAMIN D3) 5,000 unit Tab tablet Commonly known as:  VITAMIN D3 Take 1 Tab by mouth daily. diclofenac 1 % Gel Commonly known as:  VOLTAREN Apply 2 g to affected area four (4) times daily. * lisinopril-hydroCHLOROthiazide 20-12.5 mg per tablet Commonly known as:  Birder Haste Take 1 Tab by mouth daily. * lisinopril-hydroCHLOROthiazide 20-25 mg per tablet Commonly known as:  PRINZIDE, ZESTORETIC  
  
 meloxicam 15 mg tablet Commonly known as:  MOBIC Take 1 Tab by mouth daily. metoprolol tartrate 25 mg tablet Commonly known as:  LOPRESSOR Take 0.5 Tabs by mouth two (2) times a day. To help prevent chest pain  
  
 omeprazole 40 mg capsule Commonly known as:  PRILOSEC Take 1 Cap by mouth daily. * rosuvastatin 5 mg tablet Commonly known as:  CRESTOR Take 2 Tabs by mouth nightly. * rosuvastatin 20 mg tablet Commonly known as:  CRESTOR Take 20 mg by mouth daily. sertraline 50 mg tablet Commonly known as:  ZOLOFT Take 1 Tab by mouth daily. tiZANidine 2 mg tablet Commonly known as:  Corie Car TAKE 1 TABLET BY MOUTH EVERY 8 HOURS AS NEEDED * Notice: This list has 4 medication(s) that are the same as other medications prescribed for you. Read the directions carefully, and ask your doctor or other care provider to review them with you. Prescriptions Sent to Pharmacy Refills  
 metoprolol tartrate (LOPRESSOR) 25 mg tablet 1 Sig: Take 0.5 Tabs by mouth two (2) times a day. To help prevent chest pain  
 Class: Normal  
 Pharmacy: [facility], [address] [place] [address] #: [phone] Route: Oral  
  
We Performed the Following AMB POC EKG ROUTINE W/ 12 LEADS, INTER & REP [69557 CPT(R)] Introducing [facility]! Agustina Nixon introduces [employer] patient portal. Now you can access parts of your medical record, email your doctor's office, and request medication refills online. 1. In your internet browser, go to [email or web address] 2. Click on the First Time User? Click Here link in the Sign In box. You will see the New Member Sign Up page. 3. Enter your [employer] Access Code exactly as it appears below. You will not need to use this code after youve completed the sign-up process. If you do not sign up before the expiration date, you must request a new code. · [employer] Access Code: [email or web address] Expires: 8/16/2018  9:11 AM 
 
4. Enter the last four digits of your Social Security Number (xxxx) and Date of Birth (mm/dd/yyyy) as indicated and click Submit. You will be taken to the next sign-up page. 5. Create a [employer] ID. This will be your [employer]. login ID and cannot be changed, so think of one that is secure and easy to remember. 6. Create a [employer] password. You can change your password at any time. 7. Enter your Password Reset Question and Answer. This can be used at a later time if you forget your password. 8. Enter your e-mail address. You will receive e-mail notification when new information is available in [address] 9. Click Sign Up. You can now view and download portions of your medical record. 10. Click the Download Summary menu link to download a portable copy of your medical information. If you have questions, please visit the Frequently Asked Questions section of the [employer] website. Remember, [employer] is NOT to be used for urgent needs. For medical emergencies, dial 911. Now available from your iPhone and Android! Please provide this summary of care documentation to your next provider. Your primary care clinician is listed as Wilber Benson. If you have any questions after today's visit, please call [phone].

## 2018-07-05 NOTE — PROGRESS NOTES
Chief Complaint   Patient presents with    New Patient     referred by PCP, C/O chest tightness at  times, numbness in hands and restless legs/ankle swelling     1. Have you been to the ER, urgent care clinic since your last visit? Hospitalized since your last visit? No    2. Have you seen or consulted any other health care providers outside of the [address] [place] [address] since your last visit? Include any pap smears or colon screening.  No

## 2018-07-05 NOTE — PROGRESS NOTES
[address] [place] [address] [facility] [employer]  [phone]     Subjective:      Danita Anguiano is a 63 y.o. female with pmhx HTN, HLD, GERD  is here to establish care and further evaluation of intermittent exertion SSCP/chest tightness x few months. Denies shortness of breath, orthopnea, PND, palpitations, syncope, or presyncope. Has some ankle swelling d/t heel spurs and plantar fasciitis    Cardiac risk factors: HTN, HLD, age, post menopausal F, elevated BMI, sedentary lifestyle, family hx of CAD    Hx smoking: denies   Alcohol: social   Illegal drug use: denies    Family hx: mother: ok  father: Hx CABG  Siblings: murmur    Patient Active Problem List    Diagnosis Date Noted    Obesity, morbid ([facility] 05/16/2018    Gastroesophageal reflux disease without esophagitis 05/16/2018    Hypovitaminosis D 08/09/2010    Plantar fasciitis 08/03/2010    Cancer ([facility]       Sara Motta MD  Past Medical History:   Diagnosis Date    Cancer ([facility] 1986    cervical    GERD (gastroesophageal reflux disease)     Hypertension     Ill-defined condition     Fibromyalgia      Past Surgical History:   Procedure Laterality Date    HX GYN      cone resection     Allergies   Allergen Reactions    Pcn [Penicillins] Other (comments)     \"pass out\"      Family History   Problem Relation Age of Onset    Hypertension Mother     Heart Disease Father     Other Sister      FMS      Social History     Social History    Marital status:      Spouse name: N/A    Number of children: N/A    Years of education: N/A     Occupational History    Not on file.      Social History Main Topics    Smoking status: Never Smoker    Smokeless tobacco: Never Used    Alcohol use 1.5 oz/week     3 Cans of beer per week      Comment: socially    Drug use: No    Sexual activity: Yes     Partners: Male     Birth control/ protection: None     Other Topics Concern    Not on file     Social History Narrative      Current Outpatient Prescriptions   Medication Sig    lisinopril-hydroCHLOROthiazide (PRINZIDE, ZESTORETIC) 20-25 mg per tablet     rosuvastatin (CRESTOR) 20 mg tablet Take 20 mg by mouth daily.  aspirin delayed-release 81 mg tablet Take 1 Tab by mouth daily.  metoprolol tartrate (LOPRESSOR) 25 mg tablet Take 0.5 Tabs by mouth two (2) times a day. To help prevent chest pain    tiZANidine (ZANAFLEX) 2 mg tablet TAKE 1 TABLET BY MOUTH EVERY 8 HOURS AS NEEDED    sertraline (ZOLOFT) 50 mg tablet Take 1 Tab by mouth daily.  meloxicam (MOBIC) 15 mg tablet Take 1 Tab by mouth daily.  diclofenac (VOLTAREN) 1 % gel Apply 2 g to affected area four (4) times daily.  omeprazole (PRILOSEC) 40 mg capsule Take 1 Cap by mouth daily.  cholecalciferol, VITAMIN D3, (VITAMIN D3) 5,000 unit tab tablet Take 1 Tab by mouth daily.  lisinopril-hydroCHLOROthiazide (PRINZIDE, ZESTORETIC) 20-12.5 mg per tablet Take 1 Tab by mouth daily.  rosuvastatin (CRESTOR) 5 mg tablet Take 2 Tabs by mouth nightly. No current facility-administered medications for this visit. Review of Symptoms:  11 systems reviewed, negative other than as stated in the HPI    Physical ExamPhysical Exam:    Vitals:    07/05/18 1029 07/05/18 1041   BP: 120/90 122/90   Pulse: 97    Resp: 16    SpO2: 98%    Weight: 287 lb 8 oz (130.4 kg)    Height: 5' 7\" (1.702 m)      Body mass index is 45.03 kg/(m^2). General PE   Gen:  NAD  Mental Status - Alert. General Appearance - Not in acute distress. Chest and Lung Exam   Inspection: Accessory muscles - No use of accessory muscles in breathing. Auscultation:   Breath sounds: - Normal.   Cardiovascular   Inspection: Jugular vein - Bilateral - Inspection Normal.   Palpation/Percussion:   Apical Impulse: - Normal.   Auscultation: Rhythm - Regular. Heart Sounds - S1 WNL and S2 WNL. No S3 or S4. Murmurs & Other Heart Sounds: Auscultation of the heart reveals - No Murmurs.    Peripheral Vascular   Upper Extremity: Inspection - Bilateral - No Cyanotic nailbeds or Digital clubbing. Lower Extremity:   Palpation: Edema - Bilateral - mild ankle swelling  Abdomen:   Soft, non-tender, bowel sounds are active. Neuro: A&O times 3, CN and motor grossly WNL    Labs:   Lab Results   Component Value Date/Time    Cholesterol, total 195 05/16/2018 01:29 PM    Cholesterol, total 211 (H) 08/03/2010 03:01 PM    Cholesterol, total 225 (H) 08/24/2009 04:50 PM    HDL Cholesterol 81 05/16/2018 01:29 PM    HDL Cholesterol 68 08/03/2010 03:01 PM    HDL Cholesterol 61 (H) 08/24/2009 04:50 PM    LDL, calculated 93 05/16/2018 01:29 PM    LDL, calculated 119 (H) 08/03/2010 03:01 PM    LDL, calculated 121.4 (H) 08/24/2009 04:50 PM    Triglyceride 104 05/16/2018 01:29 PM    Triglyceride 118 08/03/2010 03:01 PM    Triglyceride 213 (H) 08/24/2009 04:50 PM    CHOL/HDL Ratio 3.7 08/24/2009 04:50 PM     No results found for: CPK, CPKX, CPX  Lab Results   Component Value Date/Time    Sodium 144 05/16/2018 01:29 PM    Potassium 4.1 05/16/2018 01:29 PM    Chloride 100 05/16/2018 01:29 PM    CO2 27 05/16/2018 01:29 PM    Anion gap 10 11/04/2016 07:35 AM    Glucose 88 05/16/2018 01:29 PM    BUN 19 05/16/2018 01:29 PM    Creatinine 0.99 05/16/2018 01:29 PM    BUN/Creatinine ratio 19 05/16/2018 01:29 PM    GFR est AA 73 05/16/2018 01:29 PM    GFR est non-AA 63 05/16/2018 01:29 PM    Calcium 9.6 05/16/2018 01:29 PM    Bilirubin, total 0.4 05/16/2018 01:29 PM    AST (SGOT) 21 05/16/2018 01:29 PM    Alk. phosphatase 102 05/16/2018 01:29 PM    Protein, total 7.4 05/16/2018 01:29 PM    Albumin 4.5 05/16/2018 01:29 PM    Globulin 4.5 (H) 11/04/2016 07:35 AM    A-G Ratio 1.6 05/16/2018 01:29 PM    ALT (SGPT) 17 05/16/2018 01:29 PM       EKG:  SR     Assessment:     Assessment:      1. Chest tightness    2. Hyperlipidemia, unspecified hyperlipidemia type    3. Gastroesophageal reflux disease without esophagitis    4. Obesity, morbid ([address]    5.  Essential hypertension    6. Encounter to establish care        Orders Placed This Encounter    AMB POC EKG ROUTINE W/ 12 LEADS, INTER & REP     Order Specific Question:   Reason for Exam:     Answer:   routine    lisinopril-hydroCHLOROthiazide (PRINZIDE, ZESTORETIC) 20-25 mg per tablet    rosuvastatin (CRESTOR) 20 mg tablet     Sig: Take 20 mg by mouth daily.  aspirin delayed-release 81 mg tablet     Sig: Take 1 Tab by mouth daily. Dispense:  30 Tab     Refill:  11    metoprolol tartrate (LOPRESSOR) 25 mg tablet     Sig: Take 0.5 Tabs by mouth two (2) times a day. To help prevent chest pain     Dispense:  90 Tab     Refill:  1        Plan:     Pt is a 63 y.o. female with pmhx HTN, HLD, GERD  is here to establish care and further evaluation of intermittent exertion SSCP/chest tightness x few months. Denies shortness of breath, orthopnea, PND, palpitations, syncope, or presyncope. Has some ankle swelling d/t heel spurs and plantar fasciitis    Cardiac risk factors: HTN, HLD, age, post menopausal F, elevated BMI, sedentary lifestyle, family hx of CAD    Chest tightness  Obtain baseline echo to evaluate for structural heart disease and Lexiscan  Add aspirin 81 mg daily and metoprolol 12.5 mg twice a day for now. HTN  Controlled with current therapy    HLD  5/18 LDL at 93. On statin. Lipids and labs followed by PCP.         Continue current care and f/u in 3 months unless testing abnormal      Ciera Hamilton MD

## 2018-07-06 RX ORDER — ROSUVASTATIN CALCIUM 20 MG/1
20 TABLET, COATED ORAL DAILY
Qty: 90 TAB | Refills: 1 | Status: SHIPPED | OUTPATIENT
Start: 2018-07-06 | End: 2018-08-02 | Stop reason: SDUPTHER

## 2018-07-06 RX ORDER — MELOXICAM 15 MG/1
15 TABLET ORAL DAILY
Qty: 30 TAB | Refills: 3 | Status: SHIPPED | OUTPATIENT
Start: 2018-07-06 | End: 2019-02-12

## 2018-07-19 ENCOUNTER — CLINICAL SUPPORT (OUTPATIENT)
Dept: CARDIOLOGY CLINIC | Age: 58
End: 2018-07-19

## 2018-07-19 ENCOUNTER — TELEPHONE (OUTPATIENT)
Dept: CARDIOLOGY CLINIC | Age: 58
End: 2018-07-19

## 2018-07-19 DIAGNOSIS — Z76.89 ENCOUNTER TO ESTABLISH CARE: ICD-10-CM

## 2018-07-19 DIAGNOSIS — E66.01 OBESITY, MORBID (HCC): ICD-10-CM

## 2018-07-19 DIAGNOSIS — M72.2 PLANTAR FASCIITIS: ICD-10-CM

## 2018-07-19 DIAGNOSIS — R07.89 CHEST TIGHTNESS: ICD-10-CM

## 2018-07-19 DIAGNOSIS — I10 ESSENTIAL HYPERTENSION: ICD-10-CM

## 2018-07-19 DIAGNOSIS — K21.9 GASTROESOPHAGEAL REFLUX DISEASE WITHOUT ESOPHAGITIS: ICD-10-CM

## 2018-07-19 DIAGNOSIS — E78.5 HYPERLIPIDEMIA, UNSPECIFIED HYPERLIPIDEMIA TYPE: ICD-10-CM

## 2018-07-20 ENCOUNTER — CLINICAL SUPPORT (OUTPATIENT)
Dept: CARDIOLOGY CLINIC | Age: 58
End: 2018-07-20

## 2018-07-20 DIAGNOSIS — R07.89 OTHER CHEST PAIN: Primary | ICD-10-CM

## 2018-07-23 ENCOUNTER — TELEPHONE (OUTPATIENT)
Dept: CARDIOLOGY CLINIC | Age: 58
End: 2018-07-23

## 2018-07-23 NOTE — TELEPHONE ENCOUNTER
Caller: Unspecified (4 days ago,  2:35 PM)                       Her stress test was negative so if the sob ONLY occurred after we started Metoprolol, she can stop it. I verified with Dr Vickie Gallardo.  Thanks            Previous Messages

## 2018-07-23 NOTE — TELEPHONE ENCOUNTER
----- Message from Alycia Bernstein MD sent at 7/23/2018  2:07 PM EDT -----  All Bee- please notify the patient that stress test normal and echo essentially normal.  Follow up in 3-6 months if testing normal and no progressive symptoms after gradual increase exercise and improved diet. Sooner if progressive unexplained symptoms.

## 2018-07-23 NOTE — PROGRESS NOTES
Augustina- please notify the patient that stress test normal and echo essentially normal.  Follow up in 3-6 months if testing normal and no progressive symptoms after gradual increase exercise and improved diet. Sooner if progressive unexplained symptoms.

## 2018-07-27 ENCOUNTER — TELEPHONE (OUTPATIENT)
Dept: CARDIOLOGY CLINIC | Age: 58
End: 2018-07-27

## 2018-07-27 NOTE — TELEPHONE ENCOUNTER
----- Message from Key Hernandez NP sent at 7/26/2018  4:18 PM EDT -----  Normal pumping heart strength. No significant valve problems. Continue same.

## 2018-08-02 ENCOUNTER — OFFICE VISIT (OUTPATIENT)
Dept: FAMILY MEDICINE CLINIC | Age: 58
End: 2018-08-02

## 2018-08-02 VITALS
WEIGHT: 293 LBS | RESPIRATION RATE: 20 BRPM | SYSTOLIC BLOOD PRESSURE: 159 MMHG | BODY MASS INDEX: 45.99 KG/M2 | HEART RATE: 88 BPM | OXYGEN SATURATION: 99 % | DIASTOLIC BLOOD PRESSURE: 90 MMHG | TEMPERATURE: 97.8 F | HEIGHT: 67 IN

## 2018-08-02 DIAGNOSIS — R73.02 IGT (IMPAIRED GLUCOSE TOLERANCE): ICD-10-CM

## 2018-08-02 DIAGNOSIS — I10 HYPERTENSION GOAL BP (BLOOD PRESSURE) < 130/80: Primary | ICD-10-CM

## 2018-08-02 DIAGNOSIS — M25.562 PAIN AND SWELLING OF KNEE, LEFT: ICD-10-CM

## 2018-08-02 DIAGNOSIS — M25.462 PAIN AND SWELLING OF KNEE, LEFT: ICD-10-CM

## 2018-08-02 DIAGNOSIS — Z12.11 COLON CANCER SCREENING: ICD-10-CM

## 2018-08-02 DIAGNOSIS — F41.9 ANXIETY AND DEPRESSION: ICD-10-CM

## 2018-08-02 DIAGNOSIS — F32.A ANXIETY AND DEPRESSION: ICD-10-CM

## 2018-08-02 DIAGNOSIS — M62.830 MUSCLE SPASM OF BACK: ICD-10-CM

## 2018-08-02 DIAGNOSIS — E55.9 HYPOVITAMINOSIS D: ICD-10-CM

## 2018-08-02 DIAGNOSIS — E78.00 HYPERCHOLESTEROLEMIA: ICD-10-CM

## 2018-08-02 DIAGNOSIS — E66.01 OBESITY, MORBID (HCC): ICD-10-CM

## 2018-08-02 DIAGNOSIS — K21.9 GASTROESOPHAGEAL REFLUX DISEASE WITHOUT ESOPHAGITIS: ICD-10-CM

## 2018-08-02 RX ORDER — ACETAMINOPHEN AND CODEINE PHOSPHATE 300; 30 MG/1; MG/1
1 TABLET ORAL
Qty: 20 TAB | Refills: 0 | Status: SHIPPED | OUTPATIENT
Start: 2018-08-02 | End: 2018-08-28 | Stop reason: SDUPTHER

## 2018-08-02 RX ORDER — SERTRALINE HYDROCHLORIDE 50 MG/1
50 TABLET, FILM COATED ORAL DAILY
Qty: 90 TAB | Refills: 3 | Status: SHIPPED | OUTPATIENT
Start: 2018-08-02 | End: 2020-08-21 | Stop reason: ALTCHOICE

## 2018-08-02 RX ORDER — ROSUVASTATIN CALCIUM 20 MG/1
20 TABLET, COATED ORAL DAILY
Qty: 90 TAB | Refills: 1 | Status: SHIPPED | OUTPATIENT
Start: 2018-08-02

## 2018-08-02 RX ORDER — CHOLECALCIFEROL TAB 125 MCG (5000 UNIT) 125 MCG
5000 TAB ORAL DAILY
Qty: 90 TAB | Refills: 1 | Status: SHIPPED | OUTPATIENT
Start: 2018-08-02 | End: 2019-02-12

## 2018-08-02 RX ORDER — LISINOPRIL AND HYDROCHLOROTHIAZIDE 20; 25 MG/1; MG/1
1 TABLET ORAL DAILY
Qty: 90 TAB | Refills: 1 | Status: SHIPPED | OUTPATIENT
Start: 2018-08-02 | End: 2018-08-15 | Stop reason: SDUPTHER

## 2018-08-02 RX ORDER — TIZANIDINE 2 MG/1
TABLET ORAL
Qty: 20 TAB | Refills: 1 | Status: SHIPPED | OUTPATIENT
Start: 2018-08-02 | End: 2020-08-21 | Stop reason: ALTCHOICE

## 2018-08-02 RX ORDER — OMEPRAZOLE 40 MG/1
40 CAPSULE, DELAYED RELEASE ORAL DAILY
Qty: 90 CAP | Refills: 3 | Status: SHIPPED | OUTPATIENT
Start: 2018-08-02

## 2018-08-02 NOTE — PATIENT INSTRUCTIONS
Baker's Cyst: Care Instructions Your Care Instructions A Baker's cyst is a swelling behind the knee. It may cause pain or stiffness when you bend your knee or straighten it all the way. Baker's cysts are also called popliteal cysts. If you have arthritis or another condition that is the cause of the Baker's cyst, your doctor may treat that condition. A Baker's cyst may go away on its own. If not, or if it is causing a lot of discomfort, your doctor may drain the fluid that has built up behind the knee. In some cases, a Baker's cyst is removed in surgery. There are things you can do at home, such as staying off your leg, to reduce the swelling and pain. Follow-up care is a key part of your treatment and safety. Be sure to make and go to all appointments, and call your doctor if you are having problems. It's also a good idea to know your test results and keep a list of the medicines you take. How can you care for yourself at home? · Rest your knee as much as possible. · Ask your doctor if you can take an over-the-counter pain medicine, such as acetaminophen (Tylenol), ibuprofen (Advil, Motrin), or naproxen (Aleve). Be safe with medicines. Read and follow all instructions on the label. · Use a cane, a crutch, a walker, or another device if you need help to get around. These can help rest your knees. · If you have an elastic bandage, make sure it is snug but not so tight that your leg is numb, tingles, or swells below the bandage. Ask your doctor if you can loosen the bandage if it is too tight. · Follow your doctor's instructions about how much weight you can put on your knee. · Stay at a healthy weight. Being overweight puts extra strain on your knee. When should you call for help? Call 911 anytime you think you may need emergency care.  For example, call if: 
  · You have chest pain, are short of breath, or you cough up blood.  
 Call your doctor now or seek immediate medical care if: 
  · You have new or worse pain.  
  · Your foot is cool or pale or changes color.  
  · You have tingling, weakness, or numbness in your foot or toes.  
  · You have signs of a blood clot in your leg (called a deep vein thrombosis), such as: 
¨ Pain in your calf, back of the knee, thigh, or groin. ¨ Redness or swelling in your leg.  
 Watch closely for changes in your health, and be sure to contact your doctor if: 
  · You do not get better as expected. Where can you learn more? Go to http://dani-benson.info/. Enter I858 in the search box to learn more about \"Baker's Cyst: Care Instructions. \" Current as of: November 29, 2017 Content Version: 11.7 © 3265-9406 [employer]. Care instructions adapted under license by [employer] [facility] [employer] (which disclaims liability or warranty for this information). If you have questions about a medical condition or this instruction, always ask your healthcare professional. [address] any warranty or liability for your use of this information.

## 2018-08-02 NOTE — PROGRESS NOTES
Chief Complaint Patient presents with  Leg Pain  Results HPI: 
Wilder Ribeiro is a 63 y.o. female presents to review lab results. All lab results were within normal limits except low serum vit D level. Patient agrees to start vit D supplement. Diet and exercise have been encouraged. Also, she has additional complaints of leg pain on the left with mild swelling. Pin is worse in the  
Patient declined colorectal cancer screening and immunizations Review of Systems As per hpi Past Medical History:  
Diagnosis Date  Cancer ([address] 1986  
 cervical  
 GERD (gastroesophageal reflux disease)  Hypertension  Ill-defined condition Fibromyalgia Past Surgical History:  
Procedure Laterality Date  HX GYN    
 cone resection Social History Social History  Marital status:  Spouse name: N/A  
 Number of children: N/A  
 Years of education: N/A Social History Main Topics  Smoking status: Never Smoker  Smokeless tobacco: Never Used  Alcohol use 1.5 oz/week 3 Cans of beer per week Comment: socially  Drug use: No  
 Sexual activity: Yes  
  Partners: Male Birth control/ protection: None Other Topics Concern  None Social History Narrative Current Outpatient Prescriptions Medication Sig Dispense Refill  meloxicam (MOBIC) 15 mg tablet Take 1 Tab by mouth daily. 30 Tab 3  
 rosuvastatin (CRESTOR) 20 mg tablet Take 1 Tab by mouth daily. 90 Tab 1  
 lisinopril-hydroCHLOROthiazide (PRINZIDE, ZESTORETIC) 20-25 mg per tablet  aspirin delayed-release 81 mg tablet Take 1 Tab by mouth daily. 30 Tab 11  tiZANidine (ZANAFLEX) 2 mg tablet TAKE 1 TABLET BY MOUTH EVERY 8 HOURS AS NEEDED 20 Tab 1  cholecalciferol, VITAMIN D3, (VITAMIN D3) 5,000 unit tab tablet Take 1 Tab by mouth daily. 90 Tab 1  
 sertraline (ZOLOFT) 50 mg tablet Take 1 Tab by mouth daily.  30 Tab 3  
 diclofenac (VOLTAREN) 1 % gel Apply 2 g to affected area four (4) times daily. 2 g 3  
 omeprazole (PRILOSEC) 40 mg capsule Take 1 Cap by mouth daily. 30 Cap 3 Allergies Allergen Reactions  Pcn [Penicillins] Other (comments) \"pass out\"  Metoprolol Tartrate Shortness of Breath Objective: 
Visit Vitals  /90 Comment: took BP medication  Pulse 88  Temp 97.8 °F (36.6 °C) (Oral)  Resp 20  
 Ht 5' 7\" (1.702 m)  Wt 296 lb (134.3 kg)  SpO2 99%  BMI 46.36 kg/m2 Physical Exam:  
General appearance - alert, obese appearing in no distress EYE-PERRL, EOMI Neck - supple, no significant adenopathy Chest - clear to auscultation, no wheezes, rales or rhonchi Heart - normal rate, regular rhythm, normal blood pressure Abdomen - soft, nontender, nondistended, no organomegaly Ext-peripheral pulses normal, no pedal edema Neuro -alert, oriented, normal speech, no focal findings Back-full range of motion, no tenderness, palpable spasm or pain on motion Knee-antalgic gait, soft tissue tenderness over popliteal, reduced range of motion Results for orders placed or performed in visit on 05/16/18 VITAMIN D, 25 HYDROXY Result Value Ref Range VITAMIN D, 25-HYDROXY 20.3 (L) 30.0 - 100.0 ng/mL URINALYSIS W/ RFLX MICROSCOPIC Result Value Ref Range Specific Gravity 1.016 1.005 - 1.030  
 pH (UA) 7.5 5.0 - 7.5 Color Yellow Yellow Appearance Clear Clear Leukocyte Esterase Negative Negative Protein Negative Negative/Trace Glucose Negative Negative Ketone Negative Negative Blood Negative Negative Bilirubin Negative Negative Urobilinogen 0.2 0.2 - 1.0 mg/dL Nitrites Negative Negative Microscopic Examination Comment METABOLIC PANEL, COMPREHENSIVE Result Value Ref Range Glucose 88 65 - 99 mg/dL BUN 19 6 - 24 mg/dL Creatinine 0.99 0.57 - 1.00 mg/dL GFR est non-AA 63 >59 mL/min/1.73 GFR est AA 73 >59 mL/min/1.73  
 BUN/Creatinine ratio 19 9 - 23  Sodium 144 134 - 144 mmol/L Potassium 4.1 3.5 - 5.2 mmol/L Chloride 100 96 - 106 mmol/L  
 CO2 27 18 - 29 mmol/L Calcium 9.6 8.7 - 10.2 mg/dL Protein, total 7.4 6.0 - 8.5 g/dL Albumin 4.5 3.5 - 5.5 g/dL GLOBULIN, TOTAL 2.9 1.5 - 4.5 g/dL A-G Ratio 1.6 1.2 - 2.2 Bilirubin, total 0.4 0.0 - 1.2 mg/dL Alk. phosphatase 102 39 - 117 IU/L  
 AST (SGOT) 21 0 - 40 IU/L  
 ALT (SGPT) 17 0 - 32 IU/L  
CBC WITH AUTOMATED DIFF Result Value Ref Range WBC 6.0 3.4 - 10.8 x10E3/uL  
 RBC 4.57 3.77 - 5.28 x10E6/uL HGB 12.7 11.1 - 15.9 g/dL HCT 39.2 34.0 - 46.6 % MCV 86 79 - 97 fL  
 MCH 27.8 26.6 - 33.0 pg  
 MCHC 32.4 31.5 - 35.7 g/dL  
 RDW 16.0 (H) 12.3 - 15.4 % PLATELET 544 290 - 966 x10E3/uL NEUTROPHILS 60 Not Estab. % Lymphocytes 27 Not Estab. % MONOCYTES 10 Not Estab. % EOSINOPHILS 2 Not Estab. % BASOPHILS 1 Not Estab. %  
 ABS. NEUTROPHILS 3.6 1.4 - 7.0 x10E3/uL Abs Lymphocytes 1.6 0.7 - 3.1 x10E3/uL  
 ABS. MONOCYTES 0.6 0.1 - 0.9 x10E3/uL  
 ABS. EOSINOPHILS 0.1 0.0 - 0.4 x10E3/uL  
 ABS. BASOPHILS 0.0 0.0 - 0.2 x10E3/uL IMMATURE GRANULOCYTES 0 Not Estab. %  
 ABS. IMM. GRANS. 0.0 0.0 - 0.1 x10E3/uL LIPID PANEL Result Value Ref Range Cholesterol, total 195 100 - 199 mg/dL Triglyceride 104 0 - 149 mg/dL HDL Cholesterol 81 >39 mg/dL VLDL, calculated 21 5 - 40 mg/dL LDL, calculated 93 0 - 99 mg/dL HEMOGLOBIN A1C WITH EAG Result Value Ref Range Hemoglobin A1c 5.7 (H) 4.8 - 5.6 % Estimated average glucose 117 mg/dL TSH 3RD GENERATION Result Value Ref Range TSH 2.470 0.450 - 4.500 uIU/mL HEPATITIS C AB Result Value Ref Range Hep C Virus Ab <0.1 0.0 - 0.9 s/co ratio Assessment/Plan: 
Diagnoses and all orders for this visit: Hypertension goal BP (blood pressure) < 130/80 
-     lisinopril-hydroCHLOROthiazide (PRINZIDE, ZESTORETIC) 20-25 mg per tablet; Take 1 Tab by mouth daily. , Normal, Disp-90 Tab, R-1 Muscle spasm of back -     tiZANidine (ZANAFLEX) 2 mg tablet; TAKE 1 TABLET BY MOUTH EVERY 8 HOURS AS NEEDED, NormalPlease consider 90 day supplies to promote better adherenceDisp-20 Tab, R-1 Pain and swelling of knee, left 
-     DUPLEX LOWER EXT VENOUS LEFT; Future 
-     acetaminophen-codeine (TYLENOL #3) 300-30 mg per tablet; Take 1 Tab by mouth every four (4) hours as needed for Pain. Max Daily Amount: 6 Tabs. Indications: Pain, Print, Disp-20 Tab, R-0 Hypovitaminosis D 
-     cholecalciferol, VITAMIN D3, (VITAMIN D3) 5,000 unit tab tablet; Take 1 Tab by mouth daily. , Normal, Disp-90 Tab, R-1 IGT (impaired glucose tolerance) Obesity, morbid ([address] Anxiety and depression 
-     sertraline (ZOLOFT) 50 mg tablet; Take 1 Tab by mouth daily. , Normal, Disp-90 Tab, R-3 Gastroesophageal reflux disease without esophagitis 
-     omeprazole (PRILOSEC) 40 mg capsule; Take 1 Cap by mouth daily. , Normal, Disp-90 Cap, R-3 Hypercholesterolemia 
-     rosuvastatin (CRESTOR) 20 mg tablet; Take 1 Tab by mouth daily. , Normal, Disp-90 Tab, R-1 Colon cancer screening -     OCCULT BLOOD IMMUNOASSAY,DIAGNOSTIC Patient Instructions Baker's Cyst: Care Instructions Your Care Instructions A Baker's cyst is a swelling behind the knee. It may cause pain or stiffness when you bend your knee or straighten it all the way. Baker's cysts are also called popliteal cysts. If you have arthritis or another condition that is the cause of the Baker's cyst, your doctor may treat that condition. A Baker's cyst may go away on its own. If not, or if it is causing a lot of discomfort, your doctor may drain the fluid that has built up behind the knee. In some cases, a Baker's cyst is removed in surgery. There are things you can do at home, such as staying off your leg, to reduce the swelling and pain. Follow-up care is a key part of your treatment and safety.  Be sure to make and go to all appointments, and call your doctor if you are having problems. It's also a good idea to know your test results and keep a list of the medicines you take. How can you care for yourself at home? · Rest your knee as much as possible. · Ask your doctor if you can take an over-the-counter pain medicine, such as acetaminophen (Tylenol), ibuprofen (Advil, Motrin), or naproxen (Aleve). Be safe with medicines. Read and follow all instructions on the label. · Use a cane, a crutch, a walker, or another device if you need help to get around. These can help rest your knees. · If you have an elastic bandage, make sure it is snug but not so tight that your leg is numb, tingles, or swells below the bandage. Ask your doctor if you can loosen the bandage if it is too tight. · Follow your doctor's instructions about how much weight you can put on your knee. · Stay at a healthy weight. Being overweight puts extra strain on your knee. When should you call for help? Call 911 anytime you think you may need emergency care. For example, call if: 
  · You have chest pain, are short of breath, or you cough up blood.  
 Call your doctor now or seek immediate medical care if: 
  · You have new or worse pain.  
  · Your foot is cool or pale or changes color.  
  · You have tingling, weakness, or numbness in your foot or toes.  
  · You have signs of a blood clot in your leg (called a deep vein thrombosis), such as: 
¨ Pain in your calf, back of the knee, thigh, or groin. ¨ Redness or swelling in your leg.  
 Watch closely for changes in your health, and be sure to contact your doctor if: 
  · You do not get better as expected. Where can you learn more? Go to http://dani-benson.info/. Enter Q243 in the search box to learn more about \"Baker's Cyst: Care Instructions. \" Current as of: November 29, 2017 Content Version: 11.7 © 1648-9973 [employer]  Care instructions adapted under license by [employer] (which disclaims liability or warranty for this information). If you have questions about a medical condition or this instruction, always ask your healthcare professional. [place] [address] any warranty or liability for your use of this information. Follow-up Disposition: 
Return 2-3 weeks, for f/u results of ultrasound.

## 2018-08-02 NOTE — MR AVS SNAPSHOT
Maria Dewitt [address] [facility] 83. 
[phone] Patient: Preston Oneill MRN: [id number] :1960 Visit Information Date & Time Provider Department Dept. Phone Encounter #  
 2018  9:30 AM Ruchi Cleveland MD [facility] at [address] [id number] Follow-up Instructions Return 2-3 weeks, for f/u results of ultrasound. Upcoming Health Maintenance Date Due Pneumococcal 19-64 Highest Risk (1 of 3 - PCV13) 1980 DTaP/Tdap/Td series (1 - Tdap) 1982 FOBT Q 1 YEAR AGE 50-75 2011 Influenza Age 5 to Adult 2018 PAP AKA CERVICAL CYTOLOGY 2019 MEDICARE YEARLY EXAM 2019 BREAST CANCER SCRN MAMMOGRAM 2020 Allergies as of 2018  Review Complete On: 2018 By: Ruchi Cleveland MD  
  
 Severity Noted Reaction Type Reactions Pcn [Penicillins] High 2010    Other (comments) \"pass out\" Metoprolol Tartrate  2018    Shortness of Breath Current Immunizations  Never Reviewed No immunizations on file. Not reviewed this visit You Were Diagnosed With   
  
 Codes Comments Hypertension goal BP (blood pressure) < 130/80    -  Primary ICD-10-CM: I10 
ICD-9-CM: 401.9 Muscle spasm of back     ICD-10-CM: R94.877 ICD-9-CM: 724.8 Pain and swelling of knee, left     ICD-10-CM: M25.562, M25.462 ICD-9-CM: 719.46 Hypovitaminosis D     ICD-10-CM: E55.9 ICD-9-CM: 268.9 IGT (impaired glucose tolerance)     ICD-10-CM: R73.02 
ICD-9-CM: 790.22 Obesity, morbid ([address]     ICD-10-CM: E66.01 
ICD-9-CM: 278.01 Anxiety and depression     ICD-10-CM: F41.9, F32.9 ICD-9-CM: 300.00, 311 Gastroesophageal reflux disease without esophagitis     ICD-10-CM: K21.9 ICD-9-CM: 530.81 Hypercholesterolemia     ICD-10-CM: E78.00 ICD-9-CM: 272.0 Vitals BP Pulse Temp Resp Height(growth percentile) Weight(growth percentile) 159/90 88 97.8 °F (36.6 °C) (Oral) 20 5' 7\" (1.702 m) 296 lb (134.3 kg) SpO2 BMI OB Status Smoking Status 99% 46.36 kg/m2 Menopause Never Smoker Vitals History BMI and BSA Data Body Mass Index Body Surface Area  
 46.36 kg/m 2 2.52 m 2 Preferred Pharmacy Pharmacy Name Phone [address] [phone] Your Updated Medication List  
  
   
This list is accurate as of 8/2/18 10:41 AM.  Always use your most recent med list.  
  
  
  
  
 acetaminophen-codeine 300-30 mg per tablet Commonly known as:  TYLENOL #3 Take 1 Tab by mouth every four (4) hours as needed for Pain. Max Daily Amount: 6 Tabs. Indications: Pain  
  
 aspirin delayed-release 81 mg tablet Take 1 Tab by mouth daily. cholecalciferol (VITAMIN D3) 5,000 unit Tab tablet Commonly known as:  VITAMIN D3 Take 1 Tab by mouth daily. diclofenac 1 % Gel Commonly known as:  VOLTAREN Apply 2 g to affected area four (4) times daily. lisinopril-hydroCHLOROthiazide 20-25 mg per tablet Commonly known as:  Gil Landon Take 1 Tab by mouth daily. meloxicam 15 mg tablet Commonly known as:  MOBIC Take 1 Tab by mouth daily. omeprazole 40 mg capsule Commonly known as:  PRILOSEC Take 1 Cap by mouth daily. rosuvastatin 20 mg tablet Commonly known as:  CRESTOR Take 1 Tab by mouth daily. sertraline 50 mg tablet Commonly known as:  ZOLOFT Take 1 Tab by mouth daily. tiZANidine 2 mg tablet Commonly known as:  Selinda Lovings TAKE 1 TABLET BY MOUTH EVERY 8 HOURS AS NEEDED Prescriptions Printed Refills  
 acetaminophen-codeine (TYLENOL #3) 300-30 mg per tablet 0 Sig: Take 1 Tab by mouth every four (4) hours as needed for Pain. Max Daily Amount: 6 Tabs. Indications: Pain Class: Print  Route: Oral  
  
 Prescriptions Sent to Pharmacy Refills  
 lisinopril-hydroCHLOROthiazide (PRINZIDE, ZESTORETIC) 20-25 mg per tablet 1 Sig: Take 1 Tab by mouth daily. Class: Normal  
 Pharmacy: [address] [place] [address], [address] [place] [address] Ph #: [phone] Route: Oral  
 cholecalciferol, VITAMIN D3, (VITAMIN D3) 5,000 unit tab tablet 1 Sig: Take 1 Tab by mouth daily. Class: Normal  
 Pharmacy: [address] [place] [address], [address] [place] [address] Ph #: [phone] Route: Oral  
 rosuvastatin (CRESTOR) 20 mg tablet 1 Sig: Take 1 Tab by mouth daily. Class: Normal  
 Pharmacy: [address] [place] [address], [address] [place] [address] Ph #: [phone] Route: Oral  
 tiZANidine (ZANAFLEX) 2 mg tablet 1 Sig: TAKE 1 TABLET BY MOUTH EVERY 8 HOURS AS NEEDED Class: Normal  
 Pharmacy: [facility] Ph #: [phone]  
 sertraline (ZOLOFT) 50 mg tablet 3 Sig: Take 1 Tab by mouth daily. Class: Normal  
 Pharmacy: [address] [place] [address], [address] [place] [address] Ph #: [phone] Route: Oral  
 omeprazole (PRILOSEC) 40 mg capsule 3 Sig: Take 1 Cap by mouth daily. Class: Normal  
 Pharmacy: [address] [place] [address], [address] [place] [address] Ph #: [phone] Route: Oral  
  
Follow-up Instructions Return 2-3 weeks, for f/u results of ultrasound. To-Do List   
 08/02/2018 Imaging:  DUPLEX LOWER EXT VENOUS LEFT Patient Instructions Baker's Cyst: Care Instructions Your Care Instructions A Baker's cyst is a swelling behind the knee. It may cause pain or stiffness when you bend your knee or straighten it all the way. Baker's cysts are also called popliteal cysts. If you have arthritis or another condition that is the cause of the Baker's cyst, your doctor may treat that condition. A Baker's cyst may go away on its own.  If not, or if it is causing a lot of discomfort, your doctor may drain the fluid that has built up behind the knee. In some cases, a Baker's cyst is removed in surgery. There are things you can do at home, such as staying off your leg, to reduce the swelling and pain. Follow-up care is a key part of your treatment and safety. Be sure to make and go to all appointments, and call your doctor if you are having problems. It's also a good idea to know your test results and keep a list of the medicines you take. How can you care for yourself at home? · Rest your knee as much as possible. · Ask your doctor if you can take an over-the-counter pain medicine, such as acetaminophen (Tylenol), ibuprofen (Advil, Motrin), or naproxen (Aleve). Be safe with medicines. Read and follow all instructions on the label. · Use a cane, a crutch, a walker, or another device if you need help to get around. These can help rest your knees. · If you have an elastic bandage, make sure it is snug but not so tight that your leg is numb, tingles, or swells below the bandage. Ask your doctor if you can loosen the bandage if it is too tight. · Follow your doctor's instructions about how much weight you can put on your knee. · Stay at a healthy weight. Being overweight puts extra strain on your knee. When should you call for help? Call 911 anytime you think you may need emergency care. For example, call if: 
  · You have chest pain, are short of breath, or you cough up blood.  
 Call your doctor now or seek immediate medical care if: 
  · You have new or worse pain.  
  · Your foot is cool or pale or changes color.  
  · You have tingling, weakness, or numbness in your foot or toes.  
  · You have signs of a blood clot in your leg (called a deep vein thrombosis), such as: 
¨ Pain in your calf, back of the knee, thigh, or groin. ¨ Redness or swelling in your leg.  
 Watch closely for changes in your health, and be sure to contact your doctor if: 
  · You do not get better as expected. Where can you learn more? Go to http://dani-benson.info/. Enter Y447 in the search box to learn more about \"Baker's Cyst: Care Instructions. \" Current as of: November 29, 2017 Content Version: 11.7 © 6316-1708 [employer]. Care instructions adapted under license by [employer] (which disclaims liability or warranty for this information). If you have questions about a medical condition or this instruction, always ask your healthcare professional. [address] any warranty or liability for your use of this information. Introducing [facility]! [employer] introduces RxRevu patient portal. Now you can access parts of your medical record, email your doctor's office, and request medication refills online. 1. In your internet browser, go to [email or web address] 2. Click on the First Time User? Click Here link in the Sign In box. You will see the New Member Sign Up page. 3. Enter your RxRevu Access Code exactly as it appears below. You will not need to use this code after youve completed the sign-up process. If you do not sign up before the expiration date, you must request a new code. · RxRevu Access Code: [email or web address] Expires: 8/16/2018  9:11 AM 
 
4. Enter the last four digits of your Social Security Number (xxxx) and Date of Birth (mm/dd/yyyy) as indicated and click Submit. You will be taken to the next sign-up page. 5. Create a RxRevu ID. This will be your RxRevu login ID and cannot be changed, so think of one that is secure and easy to remember. 6. Create a RxRevu password. You can change your password at any time. 7. Enter your Password Reset Question and Answer. This can be used at a later time if you forget your password. 8. Enter your e-mail address. You will receive e-mail notification when new information is available in [address] 9. Click Sign Up. You can now view and download portions of your medical record. 10. Click the Download Summary menu link to download a portable copy of your medical information. If you have questions, please visit the Frequently Asked Questions section of the Sinobpo website. Remember, Sinobpo is NOT to be used for urgent needs. For medical emergencies, dial 911. Now available from your iPhone and Android! Please provide this summary of care documentation to your next provider. Your primary care clinician is listed as Ruchi Cleveland. If you have any questions after today's visit, please call [phone].

## 2018-08-06 ENCOUNTER — HOSPITAL ENCOUNTER (OUTPATIENT)
Dept: VASCULAR SURGERY | Age: 58
Discharge: HOME | End: 2018-08-06
Attending: INTERNAL MEDICINE
Payer: MEDICARE

## 2018-08-06 DIAGNOSIS — M25.562 PAIN AND SWELLING OF KNEE, LEFT: ICD-10-CM

## 2018-08-06 DIAGNOSIS — M25.462 PAIN AND SWELLING OF KNEE, LEFT: ICD-10-CM

## 2018-08-06 PROCEDURE — 93971 EXTREMITY STUDY: CPT

## 2018-08-06 NOTE — PROCEDURES
[facility]  *** FINAL REPORT ***    Name: Karolina Bolanos  MRN: [id number]    Outpatient  : 1960  HIS Order #: [id number]  [address] [facility] Visit #: [id number]  Date: 06 Aug 2018    TYPE OF TEST: Peripheral Venous Testing    REASON FOR TEST  Pain in limb, Limb swelling    Left Leg:-  Deep venous thrombosis:           No  Superficial venous thrombosis:    No  Deep venous insufficiency:        No  Superficial venous insufficiency: No      INTERPRETATION/FINDINGS  PROCEDURE:  Venous duplex examination using B-mode, color flow and  spectral Doppler of the lower extremity veins. Left leg :  1. Deep vein(s) visualized include the common femoral, proximal  femoral, mid femoral, distal femoral, popliteal(above knee),  popliteal(fossa), popliteal(below knee), posterior tibial and peroneal   veins. 2. No evidence of deep venous thrombosis detected in the veins  visualized. 3. No evidence of deep vein thrombosis in the contralateral common  femoral vein. 4. Superficial vein(s) visualized include the great saphenous vein. 5. No evidence of superficial thrombosis detected. 6. No evidence of reflux detected in the deep veins visualized. 7. No evidence of reflux detected in the superficial veins visualized. ADDITIONAL COMMENTS    I have personally reviewed the data relevant to the interpretation of  this  study.     TECHNOLOGIST: Heidi Smallwood RVT  Signed: 2018 01:18 PM    PHYSICIAN: Consuelo Means MD  Signed: 2018 03:36 PM

## 2018-08-06 NOTE — PROGRESS NOTES
[facility]  Preliminary Report:  Venous Duplex Leg    Left leg venous duplex was performed. All deep and superficial veins appear compressible with normal Doppler characteristics. Final report to follow.

## 2018-08-15 ENCOUNTER — OFFICE VISIT (OUTPATIENT)
Dept: FAMILY MEDICINE CLINIC | Age: 58
End: 2018-08-15

## 2018-08-15 VITALS
RESPIRATION RATE: 20 BRPM | WEIGHT: 293 LBS | HEART RATE: 82 BPM | HEIGHT: 67 IN | SYSTOLIC BLOOD PRESSURE: 175 MMHG | DIASTOLIC BLOOD PRESSURE: 100 MMHG | TEMPERATURE: 96.9 F | BODY MASS INDEX: 45.99 KG/M2

## 2018-08-15 DIAGNOSIS — F32.A ANXIETY AND DEPRESSION: ICD-10-CM

## 2018-08-15 DIAGNOSIS — I10 HYPERTENSION GOAL BP (BLOOD PRESSURE) < 130/80: ICD-10-CM

## 2018-08-15 DIAGNOSIS — M25.462 PAIN AND SWELLING OF KNEE, LEFT: Primary | ICD-10-CM

## 2018-08-15 DIAGNOSIS — F41.9 ANXIETY AND DEPRESSION: ICD-10-CM

## 2018-08-15 DIAGNOSIS — M25.562 PAIN AND SWELLING OF KNEE, LEFT: Primary | ICD-10-CM

## 2018-08-15 RX ORDER — LISINOPRIL AND HYDROCHLOROTHIAZIDE 12.5; 2 MG/1; MG/1
2 TABLET ORAL DAILY
Qty: 60 TAB | Refills: 3 | Status: SHIPPED | OUTPATIENT
Start: 2018-08-15 | End: 2019-02-20 | Stop reason: SDUPTHER

## 2018-08-15 RX ORDER — SERTRALINE HYDROCHLORIDE 100 MG/1
100 TABLET, FILM COATED ORAL DAILY
Qty: 30 TAB | Refills: 1 | Status: SHIPPED | OUTPATIENT
Start: 2018-08-15

## 2018-08-15 RX ORDER — LISINOPRIL AND HYDROCHLOROTHIAZIDE 20; 25 MG/1; MG/1
1 TABLET ORAL DAILY
Qty: 90 TAB | Refills: 1 | Status: SHIPPED | OUTPATIENT
Start: 2018-08-15 | End: 2018-08-15 | Stop reason: DRUGHIGH

## 2018-08-15 NOTE — MR AVS SNAPSHOT
[address] [facility] 
[phone] Patient: Anika Langley MRN: [id number] :1960 Visit Information Date & Time Provider Department Dept. Phone Encounter #  
 8/15/2018  2:15 PM Robert Sutherland MD [facility] at [address] [id number] Follow-up Instructions Return in about 3 months (around 11/15/2018), or if symptoms worsen or fail to improve, for routine follow up. Upcoming Health Maintenance Date Due Pneumococcal 19-64 Highest Risk (1 of 3 - PCV13) 1980 DTaP/Tdap/Td series (1 - Tdap) 1982 FOBT Q 1 YEAR AGE 50-75 2011 Influenza Age 5 to Adult 2018 PAP AKA CERVICAL CYTOLOGY 2019 MEDICARE YEARLY EXAM 2019 BREAST CANCER SCRN MAMMOGRAM 2020 Allergies as of 8/15/2018  Review Complete On: 8/15/2018 By: Robert Sutherland MD  
  
 Severity Noted Reaction Type Reactions Pcn [Penicillins] High 2010    Other (comments) \"pass out\" Metoprolol Tartrate  2018    Shortness of Breath Current Immunizations  Never Reviewed No immunizations on file. Not reviewed this visit You Were Diagnosed With   
  
 Codes Comments Pain and swelling of knee, left    -  Primary ICD-10-CM: M25.562, M25.462 ICD-9-CM: 719.46 Hypertension goal BP (blood pressure) < 130/80     ICD-10-CM: I10 
ICD-9-CM: 401.9 Anxiety and depression     ICD-10-CM: F41.9, F32.9 ICD-9-CM: 300.00, 311 Vitals BP Pulse Temp Resp Height(growth percentile) Weight(growth percentile) (!) 175/100 82 96.9 °F (36.1 °C) (Oral) 20 5' 7\" (1.702 m) 296 lb (134.3 kg) BMI OB Status Smoking Status 46.36 kg/m2 Menopause Never Smoker Vitals History BMI and BSA Data Body Mass Index Body Surface Area  
 46.36 kg/m 2 2.52 m 2 [phone] Your Updated Medication List  
  
   
This list is accurate as of 8/15/18  3:06 PM.  Always use your most recent med list.  
  
  
  
  
 acetaminophen-codeine 300-30 mg per tablet Commonly known as:  TYLENOL #3 Take 1 Tab by mouth every four (4) hours as needed for Pain. Max Daily Amount: 6 Tabs. Indications: Pain  
  
 aspirin delayed-release 81 mg tablet Take 1 Tab by mouth daily. cholecalciferol (VITAMIN D3) 5,000 unit Tab tablet Commonly known as:  VITAMIN D3 Take 1 Tab by mouth daily. diclofenac 1 % Gel Commonly known as:  VOLTAREN Apply 2 g to affected area four (4) times daily. lisinopril-hydroCHLOROthiazide 20-12.5 mg per tablet Commonly known as:  Camella Rasp Take 2 Tabs by mouth daily. meloxicam 15 mg tablet Commonly known as:  MOBIC Take 1 Tab by mouth daily. omeprazole 40 mg capsule Commonly known as:  PRILOSEC Take 1 Cap by mouth daily. rosuvastatin 20 mg tablet Commonly known as:  CRESTOR Take 1 Tab by mouth daily. * sertraline 50 mg tablet Commonly known as:  ZOLOFT Take 1 Tab by mouth daily. * sertraline 100 mg tablet Commonly known as:  ZOLOFT Take 1 Tab by mouth daily. Indications: ANXIETY WITH DEPRESSION  
  
 tiZANidine 2 mg tablet Commonly known as:  Alexis Sleek TAKE 1 TABLET BY MOUTH EVERY 8 HOURS AS NEEDED * Notice: This list has 2 medication(s) that are the same as other medications prescribed for you. Read the directions carefully, and ask your doctor or other care provider to review them with you. Prescriptions Sent to Pharmacy Refills  
 lisinopril-hydroCHLOROthiazide (PRINZIDE, ZESTORETIC) 20-12.5 mg per tablet 3 Sig: Take 2 Tabs by mouth daily. Class: Normal  
 Pharmacy: [address], [address] [place] [address] Ph #: [phone]  Route: Oral  
 sertraline (ZOLOFT) 100 mg tablet 1 Sig: Take 1 Tab by mouth daily. Indications: ANXIETY WITH DEPRESSION Class: Normal  
 Pharmacy: [facility] [address] [place] [address], [address] [place] [address] #: [phone] Route: Oral  
  
We Performed the Following REFERRAL TO ORTHOPEDICS [ITK771 Custom] Comments:  
 Left knee pain and swelling Follow-up Instructions Return in about 3 months (around 11/15/2018), or if symptoms worsen or fail to improve, for routine follow up. To-Do List   
 08/15/2018 Imaging:  XR KNEE LT MAX 2 VWS Referral Information Referral ID Referred By [facility]  
  
 [id number] Status Start Date End Date 1 New Request 8/15/18 8/15/19 If your referral has a status of pending review or denied, additional information will be sent to support the outcome of this decision. Referral ID Referred By Referred To  
 [id number] [facility] [address] Phone: [phone] Fax: [phone] Visits Status Start Date End Date 1 New Request 8/15/18 8/15/19 If your referral has a status of pending review or denied, additional information will be sent to support the outcome of this decision. Patient Instructions Low Sodium Diet (2,000 Milligram): Care Instructions Your Care Instructions Too much sodium causes your body to hold on to extra water. This can raise your blood pressure and force your heart and kidneys to work harder. In very serious cases, this could cause you to be put in the hospital. It might even be life-threatening. By limiting sodium, you will feel better and lower your risk of serious problems. The most common source of sodium is salt. People get most of the salt in their diet from canned, prepared, and packaged foods.  Fast food and restaurant meals also are very high in sodium. Your doctor will probably limit your sodium to less than 2,000 milligrams (mg) a day. This limit counts all the sodium in prepared and packaged foods and any salt you add to your food. Follow-up care is a key part of your treatment and safety. Be sure to make and go to all appointments, and call your doctor if you are having problems. It's also a good idea to know your test results and keep a list of the medicines you take. How can you care for yourself at home? Read food labels · Read labels on cans and food packages. The labels tell you how much sodium is in each serving. Make sure that you look at the serving size. If you eat more than the serving size, you have eaten more sodium. · Food labels also tell you the Percent Daily Value for sodium. Choose products with low Percent Daily Values for sodium. · Be aware that sodium can come in forms other than salt, including monosodium glutamate (MSG), sodium citrate, and sodium bicarbonate (baking soda). MSG is often added to Asian food. When you eat out, you can sometimes ask for food without MSG or added salt. Buy low-sodium foods · Buy foods that are labeled \"unsalted\" (no salt added), \"sodium-free\" (less than 5 mg of sodium per serving), or \"low-sodium\" (less than 140 mg of sodium per serving). Foods labeled \"reduced-sodium\" and \"light sodium\" may still have too much sodium. Be sure to read the label to see how much sodium you are getting. · Buy fresh vegetables, or frozen vegetables without added sauces. Buy low-sodium versions of canned vegetables, soups, and other canned goods. Prepare low-sodium meals · Cut back on the amount of salt you use in cooking. This will help you adjust to the taste. Do not add salt after cooking. One teaspoon of salt has about 2,300 mg of sodium. · Take the salt shaker off the table.  
· Flavor your food with garlic, lemon juice, onion, vinegar, herbs, and spices. Do not use soy sauce, lite soy sauce, steak sauce, onion salt, garlic salt, celery salt, mustard, or ketchup on your food. · Use low-sodium salad dressings, sauces, and ketchup. Or make your own salad dressings and sauces without adding salt. · Use less salt (or none) when recipes call for it. You can often use half the salt a recipe calls for without losing flavor. Other foods such as rice, pasta, and grains do not need added salt. · Rinse canned vegetables, and cook them in fresh water. This removes some-but not all-of the salt. · Avoid water that is naturally high in sodium or that has been treated with water softeners, which add sodium. Call your local water company to find out the sodium content of your water supply. If you buy bottled water, read the label and choose a sodium-free brand. Avoid high-sodium foods · Avoid eating: ¨ Smoked, cured, salted, and canned meat, fish, and poultry. ¨ Ham, noriega, hot dogs, and luncheon meats. ¨ Regular, hard, and processed cheese and regular peanut butter. ¨ Crackers with salted tops, and other salted snack foods such as pretzels, chips, and salted popcorn. ¨ Frozen prepared meals, unless labeled low-sodium. ¨ Canned and dried soups, broths, and bouillon, unless labeled sodium-free or low-sodium. ¨ Canned vegetables, unless labeled sodium-free or low-sodium. ¨ Western Selin fries, pizza, tacos, and other fast foods. ¨ Pickles, olives, ketchup, and other condiments, especially soy sauce, unless labeled sodium-free or low-sodium. Where can you learn more? Go to http://dani-benson.info/. Enter Z664 in the search box to learn more about \"Low Sodium Diet (2,000 Milligram): Care Instructions. \" Current as of: May 12, 2017 Content Version: 11.7 © 5409-7270 [employer].  Care instructions adapted under license by [employer] (which disclaims liability or warranty for this information). If you have questions about a medical condition or this instruction, always ask your healthcare professional. [address] any warranty or liability for your use of this information. Introducing [facility]! [facility] introduces vidCoin patient portal. Now you can access parts of your medical record, email your doctor's office, and request medication refills online. 1. In your internet browser, go to [email or web address] 2. Click on the First Time User? Click Here link in the Sign In box. You will see the New Member Sign Up page. 3. Enter your vidCoin Access Code exactly as it appears below. You will not need to use this code after youve completed the sign-up process. If you do not sign up before the expiration date, you must request a new code. · vidCoin Access Code: [email or web address] Expires: 8/16/2018  9:11 AM 
 
4. Enter the last four digits of your Social Security Number (xxxx) and Date of Birth (mm/dd/yyyy) as indicated and click Submit. You will be taken to the next sign-up page. 5. Create a vidCoin ID. This will be your vidCoin login ID and cannot be changed, so think of one that is secure and easy to remember. 6. Create a vidCoin password. You can change your password at any time. 7. Enter your Password Reset Question and Answer. This can be used at a later time if you forget your password. 8. Enter your e-mail address. You will receive e-mail notification when new information is available in [address] 9. Click Sign Up. You can now view and download portions of your medical record. 10. Click the Download Summary menu link to download a portable copy of your medical information. If you have questions, please visit the Frequently Asked Questions section of the vidCoin website. Remember, vidCoin is NOT to be used for urgent needs. For medical emergencies, dial 911. Now available from your iPhone and Android! Please provide this summary of care documentation to your next provider. Your primary care clinician is listed as Sara Motta. If you have any questions after today's visit, please call [phone].

## 2018-08-15 NOTE — PATIENT INSTRUCTIONS

## 2018-08-15 NOTE — PROGRESS NOTES
Chief Complaint   Patient presents with    Results     ultra sound    Medication Evaluation     HPI:  Edgar Purcell is a 63 y.o. AA female presents to review venous ultra sound result of left LE. Ultrasound showed no blood clot. Patient is still complaining of left left knee pain and swelling. Muscle relaxant, tylenol # 3, NSAID are not working. Plan to get an xray of knee. Also, her blood pressure is markedly elevated lisinpril-hctz 20-12.5mg, she is asymptomatic. Plan to increase dose. Patient agrees to plan. Patient is on zoloft 50 mg but she feels it is not working for her. I discussed referral to a specialist. Meanwhile, adjust medication dosage. Review of Systems  As per hpi    Past Medical History:   Diagnosis Date    Cancer ([facility] [address] 1986    cervical    GERD (gastroesophageal reflux disease)     Hypertension     Ill-defined condition     Fibromyalgia     Past Surgical History:   Procedure Laterality Date    HX GYN      cone resection     Social History     Social History    Marital status:      Spouse name: N/A    Number of children: N/A    Years of education: N/A     Social History Main Topics    Smoking status: Never Smoker    Smokeless tobacco: Never Used    Alcohol use 1.5 oz/week     3 Cans of beer per week      Comment: socially    Drug use: No    Sexual activity: Yes     Partners: Male     Birth control/ protection: None     Other Topics Concern    None     Social History Narrative     Family History   Problem Relation Age of Onset    Hypertension Mother     Heart Disease Father     Other Sister      FMS     Current Outpatient Prescriptions   Medication Sig Dispense Refill    lisinopril-hydroCHLOROthiazide (PRINZIDE, ZESTORETIC) 20-25 mg per tablet Take 1 Tab by mouth daily. 90 Tab 1    cholecalciferol, VITAMIN D3, (VITAMIN D3) 5,000 unit tab tablet Take 1 Tab by mouth daily. 90 Tab 1    rosuvastatin (CRESTOR) 20 mg tablet Take 1 Tab by mouth daily.  90 Tab 1    tiZANidine (ZANAFLEX) 2 mg tablet TAKE 1 TABLET BY MOUTH EVERY 8 HOURS AS NEEDED 20 Tab 1    sertraline (ZOLOFT) 50 mg tablet Take 1 Tab by mouth daily. 90 Tab 3    omeprazole (PRILOSEC) 40 mg capsule Take 1 Cap by mouth daily. 90 Cap 3    acetaminophen-codeine (TYLENOL #3) 300-30 mg per tablet Take 1 Tab by mouth every four (4) hours as needed for Pain. Max Daily Amount: 6 Tabs. Indications: Pain 20 Tab 0    meloxicam (MOBIC) 15 mg tablet Take 1 Tab by mouth daily. 30 Tab 3    aspirin delayed-release 81 mg tablet Take 1 Tab by mouth daily. 30 Tab 11    diclofenac (VOLTAREN) 1 % gel Apply 2 g to affected area four (4) times daily.  2 g 3     Allergies   Allergen Reactions    Pcn [Penicillins] Other (comments)     \"pass out\"    Metoprolol Tartrate Shortness of Breath     Objective:  Visit Vitals    BP (!) 175/100    Pulse 82    Temp 96.9 °F (36.1 °C) (Oral)    Resp 20    Ht 5' 7\" (1.702 m)    Wt 296 lb (134.3 kg)    BMI 46.36 kg/m2     Physical Exam:   General appearance - alert, oriented X 3 appearing in no distress  EYE-ARNALDO, EOMI, fundi normal, corneas normal, no foreign bodies  Neck - supple, no significant adenopathy   Chest - clear to auscultation, no wheezes, rales or rhonchi  Heart - normal rate, regular rhythm, elevated blood pressure  Abdomen - soft, nontender, nondistended, no organomegaly  Ext-peripheral pulses normal, no pedal edema  Knee-antalgic gait, soft tissue tenderness over left, reduced range of motion, normal contralateral knee exam     Results for orders placed or performed in visit on 05/16/18   VITAMIN D, 25 HYDROXY   Result Value Ref Range    VITAMIN D, 25-HYDROXY 20.3 (L) 30.0 - 100.0 ng/mL   URINALYSIS W/ RFLX MICROSCOPIC   Result Value Ref Range    Specific Gravity 1.016 1.005 - 1.030    pH (UA) 7.5 5.0 - 7.5    Color Yellow Yellow    Appearance Clear Clear    Leukocyte Esterase Negative Negative    Protein Negative Negative/Trace    Glucose Negative Negative    Ketone Negative Negative    Blood Negative Negative    Bilirubin Negative Negative    Urobilinogen 0.2 0.2 - 1.0 mg/dL    Nitrites Negative Negative    Microscopic Examination Comment    METABOLIC PANEL, COMPREHENSIVE   Result Value Ref Range    Glucose 88 65 - 99 mg/dL    BUN 19 6 - 24 mg/dL    Creatinine 0.99 0.57 - 1.00 mg/dL    GFR est non-AA 63 >59 mL/min/1.73    GFR est AA 73 >59 mL/min/1.73    BUN/Creatinine ratio 19 9 - 23    Sodium 144 134 - 144 mmol/L    Potassium 4.1 3.5 - 5.2 mmol/L    Chloride 100 96 - 106 mmol/L    CO2 27 18 - 29 mmol/L    Calcium 9.6 8.7 - 10.2 mg/dL    Protein, total 7.4 6.0 - 8.5 g/dL    Albumin 4.5 3.5 - 5.5 g/dL    GLOBULIN, TOTAL 2.9 1.5 - 4.5 g/dL    A-G Ratio 1.6 1.2 - 2.2    Bilirubin, total 0.4 0.0 - 1.2 mg/dL    Alk. phosphatase 102 39 - 117 IU/L    AST (SGOT) 21 0 - 40 IU/L    ALT (SGPT) 17 0 - 32 IU/L   CBC WITH AUTOMATED DIFF   Result Value Ref Range    WBC 6.0 3.4 - 10.8 x10E3/uL    RBC 4.57 3.77 - 5.28 x10E6/uL    HGB 12.7 11.1 - 15.9 g/dL    HCT 39.2 34.0 - 46.6 %    MCV 86 79 - 97 fL    MCH 27.8 26.6 - 33.0 pg    MCHC 32.4 31.5 - 35.7 g/dL    RDW 16.0 (H) 12.3 - 15.4 %    PLATELET 409 979 - 830 x10E3/uL    NEUTROPHILS 60 Not Estab. %    Lymphocytes 27 Not Estab. %    MONOCYTES 10 Not Estab. %    EOSINOPHILS 2 Not Estab. %    BASOPHILS 1 Not Estab. %    ABS. NEUTROPHILS 3.6 1.4 - 7.0 x10E3/uL    Abs Lymphocytes 1.6 0.7 - 3.1 x10E3/uL    ABS. MONOCYTES 0.6 0.1 - 0.9 x10E3/uL    ABS. EOSINOPHILS 0.1 0.0 - 0.4 x10E3/uL    ABS. BASOPHILS 0.0 0.0 - 0.2 x10E3/uL    IMMATURE GRANULOCYTES 0 Not Estab. %    ABS. IMM.  GRANS. 0.0 0.0 - 0.1 x10E3/uL   LIPID PANEL   Result Value Ref Range    Cholesterol, total 195 100 - 199 mg/dL    Triglyceride 104 0 - 149 mg/dL    HDL Cholesterol 81 >39 mg/dL    VLDL, calculated 21 5 - 40 mg/dL    LDL, calculated 93 0 - 99 mg/dL   HEMOGLOBIN A1C WITH EAG   Result Value Ref Range    Hemoglobin A1c 5.7 (H) 4.8 - 5.6 %    Estimated average glucose 117 mg/dL   TSH 3RD GENERATION   Result Value Ref Range    TSH 2.470 0.450 - 4.500 uIU/mL   HEPATITIS C AB   Result Value Ref Range    Hep C Virus Ab <0.1 0.0 - 0.9 s/co ratio     Assessment/Plan:  Diagnoses and all orders for this visit:    Pain and swelling of knee, left  -     XR KNEE LT MAX 2 VWS; Future  -     [facility] (Sports Medicine) [facility]TH[facility]    Hypertension goal BP (blood pressure) < 130/80  -     lisinopril-hydroCHLOROthiazide (PRINZIDE, ZESTORETIC) 20-12.5 mg per tablet; Take 2 Tabs by mouth daily. , Normal, Disp-60 Tab, R-3  -     Discontinue: lisinopril-hydroCHLOROthiazide (PRINZIDE, ZESTORETIC) 20-25 mg per tablet; Take 1 Tab by mouth daily. , Normal, Disp-90 Tab, R-1    Anxiety and depression  -     sertraline (ZOLOFT) 100 mg tablet; Take 1 Tab by mouth daily. Indications: ANXIETY WITH DEPRESSION, Normal, Disp-30 Tab, R-1      Patient Instructions        Low Sodium Diet (2,000 Milligram): Care Instructions  Your Care Instructions    Too much sodium causes your body to hold on to extra water. This can raise your blood pressure and force your heart and kidneys to work harder. In very serious cases, this could cause you to be put in the hospital. It might even be life-threatening. By limiting sodium, you will feel better and lower your risk of serious problems. The most common source of sodium is salt. People get most of the salt in their diet from canned, prepared, and packaged foods. Fast food and restaurant meals also are very high in sodium. Your doctor will probably limit your sodium to less than 2,000 milligrams (mg) a day. This limit counts all the sodium in prepared and packaged foods and any salt you add to your food. Follow-up care is a key part of your treatment and safety. Be sure to make and go to all appointments, and call your doctor if you are having problems. It's also a good idea to know your test results and keep a list of the medicines you take. How can you care for yourself at home?   Read food labels  · Read labels on cans and food packages. The labels tell you how much sodium is in each serving. Make sure that you look at the serving size. If you eat more than the serving size, you have eaten more sodium. · Food labels also tell you the Percent Daily Value for sodium. Choose products with low Percent Daily Values for sodium. · Be aware that sodium can come in forms other than salt, including monosodium glutamate (MSG), sodium citrate, and sodium bicarbonate (baking soda). MSG is often added to Asian food. When you eat out, you can sometimes ask for food without MSG or added salt. Buy low-sodium foods  · Buy foods that are labeled \"unsalted\" (no salt added), \"sodium-free\" (less than 5 mg of sodium per serving), or \"low-sodium\" (less than 140 mg of sodium per serving). Foods labeled \"reduced-sodium\" and \"light sodium\" may still have too much sodium. Be sure to read the label to see how much sodium you are getting. · Buy fresh vegetables, or frozen vegetables without added sauces. Buy low-sodium versions of canned vegetables, soups, and other canned goods. Prepare low-sodium meals  · Cut back on the amount of salt you use in cooking. This will help you adjust to the taste. Do not add salt after cooking. One teaspoon of salt has about 2,300 mg of sodium. · Take the salt shaker off the table. · Flavor your food with garlic, lemon juice, onion, vinegar, herbs, and spices. Do not use soy sauce, lite soy sauce, steak sauce, onion salt, garlic salt, celery salt, mustard, or ketchup on your food. · Use low-sodium salad dressings, sauces, and ketchup. Or make your own salad dressings and sauces without adding salt. · Use less salt (or none) when recipes call for it. You can often use half the salt a recipe calls for without losing flavor. Other foods such as rice, pasta, and grains do not need added salt. · Rinse canned vegetables, and cook them in fresh water.  This removes some-but not all-of the salt.  · Avoid water that is naturally high in sodium or that has been treated with water softeners, which add sodium. Call your local water company to find out the sodium content of your water supply. If you buy bottled water, read the label and choose a sodium-free brand. Avoid high-sodium foods  · Avoid eating:  ¨ Smoked, cured, salted, and canned meat, fish, and poultry. ¨ Ham, noriega, hot dogs, and luncheon meats. ¨ Regular, hard, and processed cheese and regular peanut butter. ¨ Crackers with salted tops, and other salted snack foods such as pretzels, chips, and salted popcorn. ¨ Frozen prepared meals, unless labeled low-sodium. ¨ Canned and dried soups, broths, and bouillon, unless labeled sodium-free or low-sodium. ¨ Canned vegetables, unless labeled sodium-free or low-sodium. ¨ [place] fries, pizza, tacos, and other fast foods. ¨ Pickles, olives, ketchup, and other condiments, especially soy sauce, unless labeled sodium-free or low-sodium. Where can you learn more? Go to http://dani-benson.info/. Enter S027 in the search box to learn more about \"Low Sodium Diet (2,000 Milligram): Care Instructions. \"  Current as of: May 12, 2017  Content Version: 11.7  © 5341-5502 [employer]. Care instructions adapted under license by [employer] (which disclaims liability or warranty for this information). If you have questions about a medical condition or this instruction, always ask your healthcare professional. [place] [address] any warranty or liability for your use of this information. Follow-up Disposition:  Return in about 3 months (around 11/15/2018), or if symptoms worsen or fail to improve, for routine follow up.

## 2018-08-18 LAB — HEMOCCULT STL QL IA: POSITIVE

## 2018-08-28 ENCOUNTER — OFFICE VISIT (OUTPATIENT)
Dept: FAMILY MEDICINE CLINIC | Age: 58
End: 2018-08-28

## 2018-08-28 VITALS
BODY MASS INDEX: 45.99 KG/M2 | HEART RATE: 90 BPM | OXYGEN SATURATION: 98 % | RESPIRATION RATE: 20 BRPM | WEIGHT: 293 LBS | TEMPERATURE: 98.5 F | HEIGHT: 67 IN | DIASTOLIC BLOOD PRESSURE: 84 MMHG | SYSTOLIC BLOOD PRESSURE: 131 MMHG

## 2018-08-28 DIAGNOSIS — M72.2 PLANTAR FASCIITIS: Primary | ICD-10-CM

## 2018-08-28 DIAGNOSIS — M25.462 PAIN AND SWELLING OF KNEE, LEFT: ICD-10-CM

## 2018-08-28 DIAGNOSIS — M77.32 BILATERAL CALCANEAL SPURS: ICD-10-CM

## 2018-08-28 DIAGNOSIS — R19.5 POSITIVE FIT (FECAL IMMUNOCHEMICAL TEST): ICD-10-CM

## 2018-08-28 DIAGNOSIS — M77.31 BILATERAL CALCANEAL SPURS: ICD-10-CM

## 2018-08-28 DIAGNOSIS — M25.562 PAIN AND SWELLING OF KNEE, LEFT: ICD-10-CM

## 2018-08-28 RX ORDER — ACETAMINOPHEN AND CODEINE PHOSPHATE 300; 30 MG/1; MG/1
1 TABLET ORAL
Qty: 20 TAB | Refills: 0 | Status: SHIPPED | OUTPATIENT
Start: 2018-08-28 | End: 2020-04-15 | Stop reason: ALTCHOICE

## 2018-08-28 NOTE — PROGRESS NOTES
Chief Complaint   Patient presents with    Results     FIT test    Medication Refill     HPI:  Kasie Armstrong is a 63 y.o.  female presents to review results of FIT test.  The FIT is positive. Patient will therefore have be referred for colonoscopy. Patient agrees to plan. Patient is still complaining of left knee pain and swelling. Muscle relaxant, tylenol # 3 and NSAID are not working. Plan: refer to orthopedics for treatment for pain mangement and podiatrist ankle pain management. Patient is on zoloft 50 mg but she feels it is not working for her. I discussed referral to a specialist. Meanwhile, adjust medication dosage. Review of Systems  As per hpi    Past Medical History:   Diagnosis Date    Cancer ([facility] [address] 1986    cervical    GERD (gastroesophageal reflux disease)     Hypertension     Ill-defined condition     Fibromyalgia     Past Surgical History:   Procedure Laterality Date    HX GYN      cone resection     Social History     Social History    Marital status:      Spouse name: N/A    Number of children: N/A    Years of education: N/A     Social History Main Topics    Smoking status: Never Smoker    Smokeless tobacco: Never Used    Alcohol use 1.5 oz/week     3 Cans of beer per week      Comment: socially    Drug use: No    Sexual activity: Yes     Partners: Male     Birth control/ protection: None     Other Topics Concern    None     Social History Narrative     Family History   Problem Relation Age of Onset    Hypertension Mother     Heart Disease Father     Other Sister      FMS     Current Outpatient Prescriptions   Medication Sig Dispense Refill    lisinopril-hydroCHLOROthiazide (PRINZIDE, ZESTORETIC) 20-12.5 mg per tablet Take 2 Tabs by mouth daily. 60 Tab 3    sertraline (ZOLOFT) 100 mg tablet Take 1 Tab by mouth daily. Indications: ANXIETY WITH DEPRESSION 30 Tab 1    cholecalciferol, VITAMIN D3, (VITAMIN D3) 5,000 unit tab tablet Take 1 Tab by mouth daily. 90 Tab 1    rosuvastatin (CRESTOR) 20 mg tablet Take 1 Tab by mouth daily. 90 Tab 1    tiZANidine (ZANAFLEX) 2 mg tablet TAKE 1 TABLET BY MOUTH EVERY 8 HOURS AS NEEDED 20 Tab 1    sertraline (ZOLOFT) 50 mg tablet Take 1 Tab by mouth daily. 90 Tab 3    omeprazole (PRILOSEC) 40 mg capsule Take 1 Cap by mouth daily. 90 Cap 3    acetaminophen-codeine (TYLENOL #3) 300-30 mg per tablet Take 1 Tab by mouth every four (4) hours as needed for Pain. Max Daily Amount: 6 Tabs. Indications: Pain 20 Tab 0    meloxicam (MOBIC) 15 mg tablet Take 1 Tab by mouth daily. 30 Tab 3    aspirin delayed-release 81 mg tablet Take 1 Tab by mouth daily. 30 Tab 11    diclofenac (VOLTAREN) 1 % gel Apply 2 g to affected area four (4) times daily. 2 g 3     Allergies   Allergen Reactions    Pcn [Penicillins] Other (comments)     \"pass out\"    Metoprolol Tartrate Shortness of Breath       Objective:  Visit Vitals    /84    Pulse 90    Temp 98.5 °F (36.9 °C) (Oral)    Resp 20    Ht 5' 7\" (1.702 m)    Wt 296 lb (134.3 kg)    SpO2 98%    BMI 46.36 kg/m2     Physical Exam:   General appearance - alert, obese appearing in moderate distress  Neck - supple, no significant adenopathy   Chest - clear to auscultation, no wheezes, rales or rhonchi  Heart - normal rate, regular rhythm, normal blood pressure  Abdomen - soft, nontender, nondistended, no organomegaly  Ext-peripheral pulses normal, no pedal edema  Knee-antalgic gait, reduced range of motion of left knee and both ankles    Results for orders placed or performed in visit on 08/02/18   OCCULT BLOOD IMMUNOASSAY,DIAGNOSTIC   Result Value Ref Range    Occult blood fecal, by IA Positive (A) Negative     Assessment/Plan:  Diagnoses and all orders for this visit:    Plantar fasciitis  -     REFERRAL TO PODIATRY  -     acetaminophen-codeine (TYLENOL #3) 300-30 mg per tablet; Take 1 Tab by mouth every four (4) hours as needed for Pain. Max Daily Amount: 6 Tabs.  Indications: Pain, Print, Disp-20 Tab, R-0    Bilateral calcaneal spurs  -     REFERRAL TO PODIATRY  -     acetaminophen-codeine (TYLENOL #3) 300-30 mg per tablet; Take 1 Tab by mouth every four (4) hours as needed for Pain. Max Daily Amount: 6 Tabs. Indications: Pain, Print, Disp-20 Tab, R-0    Pain and swelling of knee, left  -     REFERRAL TO ORTHOPEDIC SURGERY  -     acetaminophen-codeine (TYLENOL #3) 300-30 mg per tablet; Take 1 Tab by mouth every four (4) hours as needed for Pain. Max Daily Amount: 6 Tabs. Indications: Pain, Print, Disp-20 Tab, R-0    Positive FIT (fecal immunochemical test)  -     REFERRAL TO GASTROENTEROLOGY      Patient Instructions        Fecal Immunochemical Test (FIT): About This Test  What is it? A fecal immunochemical test, or FIT, checks for hidden blood in the stool. Your doctor gives you a kit that contains everything you need. At home, you follow simple steps to collect a small amount of stool. You return the kit to the doctor or to a lab. Why is this test done? This test is done to check for colorectal cancer and other types of gastrointestinal problems, such as hemorrhoids, anal fissures, and colon polyps, that can cause blood in the stools. How can you prepare for the test?  · Don't do the test during your menstrual period or if you are having bleeding from hemorrhoids. What happens during the test?  There are different types of home tests available. It is important to follow the instructions provided with any test.  Here are some general instructions:  · Check the expiration date on the package. Don't use a test kit after its expiration date. · Follow the instructions exactly. Do all the steps, in order, without skipping any of them. · After you finish your test, follow the instructions that you were given for returning the test.  There is a FIT test that shows the results right away.  If your test shows that blood was found in your stool sample, call your doctor as soon as possible. Follow-up care is a key part of your treatment and safety. Be sure to make and go to all appointments, and call your doctor if you are having problems. It's also a good idea to keep a list of the medicines you take. Ask your doctor when you can expect to have your test results. Where can you learn more? Go to http://dani-benson.info/. Enter O839 in the search box to learn more about \"Fecal Immunochemical Test (FIT): About This Test.\"  Current as of: May 12, 2017  Content Version: 11.7  © 7632-0882 [employer]. Care instructions adapted under license by [employer] (which disclaims liability or warranty for this information). If you have questions about a medical condition or this instruction, always ask your healthcare professional. [address] any warranty or liability for your use of this information. Follow-up Disposition:  Return in about 4 months (around 12/28/2018), or if symptoms worsen or fail to improve, for routine follow up.

## 2018-08-28 NOTE — MR AVS SNAPSHOT
[facility] [address] [facility] [address] [place], [place] [id number] Patient: Brennen Gomez MRN: [id number] :1960 Visit Information Date & Time Provider Department Dept. Phone Encounter #  
 2018 12:00 PM Queenie Blackwood MD [facility] at [address] [id number] Follow-up Instructions Return in about 4 months (around 2018), or if symptoms worsen or fail to improve, for routine follow up. Upcoming Health Maintenance Date Due Pneumococcal 19-64 Highest Risk (1 of 3 - PCV13) 1980 DTaP/Tdap/Td series (1 - Tdap) 1982 Influenza Age 5 to Adult 2018 PAP AKA CERVICAL CYTOLOGY 2019 MEDICARE YEARLY EXAM 2019 FOBT Q 1 YEAR AGE 50-75 8/15/2019 BREAST CANCER SCRN MAMMOGRAM 2020 Allergies as of 2018  Review Complete On: 2018 By: Queenie Blackwood MD  
  
 Severity Noted Reaction Type Reactions Pcn [Penicillins] High 2010    Other (comments) \"pass out\" Metoprolol Tartrate  2018    Shortness of Breath Current Immunizations  Never Reviewed No immunizations on file. Not reviewed this visit You Were Diagnosed With   
  
 Codes Comments Plantar fasciitis    -  Primary ICD-10-CM: M72.2 ICD-9-CM: 728.71 Bilateral calcaneal spurs     ICD-10-CM: M77.31, M77.32 
ICD-9-CM: 726.73 Pain and swelling of knee, left     ICD-10-CM: M25.562, M25.462 ICD-9-CM: 719.46 Positive FIT (fecal immunochemical test)     ICD-10-CM: R19.5 ICD-9-CM: 792.1 Vitals BP Pulse Temp Resp Height(growth percentile) Weight(growth percentile) 131/84 90 98.5 °F (36.9 °C) (Oral) 20 5' 7\" (1.702 m) 296 lb (134.3 kg) SpO2 BMI OB Status Smoking Status 98% 46.36 kg/m2 Menopause Never Smoker Vitals History BMI and BSA Data Body Mass Index Body Surface Area  
 46.36 kg/m 2 2.52 m 2 Preferred Pharmacy Pharmacy Name Phone [address] [phone] Your Updated Medication List  
  
   
This list is accurate as of 8/28/18  1:53 PM.  Always use your most recent med list.  
  
  
  
  
 acetaminophen-codeine 300-30 mg per tablet Commonly known as:  TYLENOL #3 Take 1 Tab by mouth every four (4) hours as needed for Pain. Max Daily Amount: 6 Tabs. Indications: Pain  
  
 aspirin delayed-release 81 mg tablet Take 1 Tab by mouth daily. cholecalciferol (VITAMIN D3) 5,000 unit Tab tablet Commonly known as:  VITAMIN D3 Take 1 Tab by mouth daily. diclofenac 1 % Gel Commonly known as:  VOLTAREN Apply 2 g to affected area four (4) times daily. lisinopril-hydroCHLOROthiazide 20-12.5 mg per tablet Commonly known as:  Donnamarie Parrot Take 2 Tabs by mouth daily. meloxicam 15 mg tablet Commonly known as:  MOBIC Take 1 Tab by mouth daily. omeprazole 40 mg capsule Commonly known as:  PRILOSEC Take 1 Cap by mouth daily. rosuvastatin 20 mg tablet Commonly known as:  CRESTOR Take 1 Tab by mouth daily. * sertraline 50 mg tablet Commonly known as:  ZOLOFT Take 1 Tab by mouth daily. * sertraline 100 mg tablet Commonly known as:  ZOLOFT Take 1 Tab by mouth daily. Indications: ANXIETY WITH DEPRESSION  
  
 tiZANidine 2 mg tablet Commonly known as:  Yonathan Mule TAKE 1 TABLET BY MOUTH EVERY 8 HOURS AS NEEDED * Notice: This list has 2 medication(s) that are the same as other medications prescribed for you. Read the directions carefully, and ask your doctor or other care provider to review them with you. Prescriptions Printed Refills  
 acetaminophen-codeine (TYLENOL #3) 300-30 mg per tablet 0 Sig: Take 1 Tab by mouth every four (4) hours as needed for Pain. Max Daily Amount: 6 Tabs. Indications: Pain Class: Print Route: Oral  
  
We Performed the Following REFERRAL TO GASTROENTEROLOGY [EPZ58 Custom] Comments:  
 Please evaluate patient for positive FIT  
 REFERRAL TO ORTHOPEDIC SURGERY [REF62 Custom] Comments:  
 Pain and swelling left knee REFERRAL TO PODIATRY [REF90 Custom] Comments:  
 Please evaluate patient for Incidental large calcaneal spurs and ankle swelling bilaterally Follow-up Instructions Return in about 4 months (around 12/28/2018), or if symptoms worsen or fail to improve, for routine follow up. Referral Information Referral ID Referred By Referred To  
  
 [id number] [facility], [facility] [address] [place], [address] Phone: [phone] Visits Status Start Date End Date 1 New Request 8/28/18 8/28/19 If your referral has a status of pending review or denied, additional information will be sent to support the outcome of this decision. Referral ID Referred By Referred To  
 [id number] [facility]  
   [address] [place], [address] [place] [address] Visits Status Start Date End Date 1 New Request 8/28/18 8/28/19 If your referral has a status of pending review or denied, additional information will be sent to support the outcome of this decision. Referral ID Referred By Referred To  
 [id number] [facility], [address] [facility] [address] [place], [address] Visits Status Start Date End Date 1 New Request 8/28/18 8/28/19 If your referral has a status of pending review or denied, additional information will be sent to support the outcome of this decision. Patient Instructions Fecal Immunochemical Test (FIT): About This Test 
What is it? A fecal immunochemical test, or FIT, checks for hidden blood in the stool. Your doctor gives you a kit that contains everything you need.  At home, you follow simple steps to collect a small amount of stool. You return the kit to the doctor or to a lab. Why is this test done? This test is done to check for colorectal cancer and other types of gastrointestinal problems, such as hemorrhoids, anal fissures, and colon polyps, that can cause blood in the stools. How can you prepare for the test? 
· Don't do the test during your menstrual period or if you are having bleeding from hemorrhoids. What happens during the test? 
There are different types of home tests available. It is important to follow the instructions provided with any test. 
Here are some general instructions: 
· Check the expiration date on the package. Don't use a test kit after its expiration date. · Follow the instructions exactly. Do all the steps, in order, without skipping any of them. · After you finish your test, follow the instructions that you were given for returning the test. 
There is a FIT test that shows the results right away. If your test shows that blood was found in your stool sample, call your doctor as soon as possible. Follow-up care is a key part of your treatment and safety. Be sure to make and go to all appointments, and call your doctor if you are having problems. It's also a good idea to keep a list of the medicines you take. Ask your doctor when you can expect to have your test results. Where can you learn more? Go to http://dani-benson.info/. Enter T561 in the search box to learn more about \"Fecal Immunochemical Test (FIT): About This Test.\" Current as of: May 12, 2017 Content Version: 11.7 © 6083-1132 [employer]. Care instructions adapted under license by [employer] (which disclaims liability or warranty for this information).  If you have questions about a medical condition or this instruction, always ask your healthcare professional. [employer] disclaims any warranty or liability for your use of this information. Introducing [address]! Nguyen Potts introduces [employer] patient portal. Now you can access parts of your medical record, email your doctor's office, and request medication refills online. 1. In your internet browser, go to [email or web address] 2. Click on the First Time User? Click Here link in the Sign In box. You will see the New Member Sign Up page. 3. Enter your [employer] Access Code exactly as it appears below. You will not need to use this code after youve completed the sign-up process. If you do not sign up before the expiration date, you must request a new code. · [employer] Access Code: [email or web address] Expires: 11/26/2018  1:53 PM 
 
4. Enter the last four digits of your Social Security Number (xxxx) and Date of Birth (mm/dd/yyyy) as indicated and click Submit. You will be taken to the next sign-up page. 5. Create a [employer]Living ID. This will be your [employer]Living login ID and cannot be changed, so think of one that is secure and easy to remember. 6. Create a [employer] password. You can change your password at any time. 7. Enter your Password Reset Question and Answer. This can be used at a later time if you forget your password. 8. Enter your e-mail address. You will receive e-mail notification when new information is available in [address] 9. Click Sign Up. You can now view and download portions of your medical record. 10. Click the Download Summary menu link to download a portable copy of your medical information. If you have questions, please visit the Frequently Asked Questions section of the [employer] website. Remember, [employer] is NOT to be used for urgent needs. For medical emergencies, dial 911. Now available from your iPhone and Android! Please provide this summary of care documentation to your next provider. Your primary care clinician is listed as Donal Villareal. If you have any questions after today's visit, please call [phone].

## 2018-08-28 NOTE — PATIENT INSTRUCTIONS
Fecal Immunochemical Test (FIT): About This Test  What is it? A fecal immunochemical test, or FIT, checks for hidden blood in the stool. Your doctor gives you a kit that contains everything you need. At home, you follow simple steps to collect a small amount of stool. You return the kit to the doctor or to a lab. Why is this test done? This test is done to check for colorectal cancer and other types of gastrointestinal problems, such as hemorrhoids, anal fissures, and colon polyps, that can cause blood in the stools. How can you prepare for the test?  · Don't do the test during your menstrual period or if you are having bleeding from hemorrhoids. What happens during the test?  There are different types of home tests available. It is important to follow the instructions provided with any test.  Here are some general instructions:  · Check the expiration date on the package. Don't use a test kit after its expiration date. · Follow the instructions exactly. Do all the steps, in order, without skipping any of them. · After you finish your test, follow the instructions that you were given for returning the test.  There is a FIT test that shows the results right away. If your test shows that blood was found in your stool sample, call your doctor as soon as possible. Follow-up care is a key part of your treatment and safety. Be sure to make and go to all appointments, and call your doctor if you are having problems. It's also a good idea to keep a list of the medicines you take. Ask your doctor when you can expect to have your test results. Where can you learn more? Go to http://dani-benson.info/. Enter V142 in the search box to learn more about \"Fecal Immunochemical Test (FIT): About This Test.\"  Current as of: May 12, 2017  Content Version: 11.7  © 1567-7752 [employer].  Care instructions adapted under license by [employer] (which disclaims liability or warranty for this information). If you have questions about a medical condition or this instruction, always ask your healthcare professional. [place] [address] any warranty or liability for your use of this information.

## 2018-09-29 DIAGNOSIS — M62.830 MUSCLE SPASM OF BACK: ICD-10-CM

## 2018-09-29 RX ORDER — TIZANIDINE 2 MG/1
TABLET ORAL
Qty: 20 TAB | Refills: 1 | Status: SHIPPED | OUTPATIENT
Start: 2018-09-29

## 2018-10-23 ENCOUNTER — TELEPHONE (OUTPATIENT)
Dept: FAMILY MEDICINE CLINIC | Age: 58
End: 2018-10-23

## 2018-10-23 NOTE — TELEPHONE ENCOUNTER
----- Message from Thony Banda sent at 10/23/2018  2:23 PM EDT -----  Regarding: Dr. Key Yu, with [facility] Irvin (Dr. Mohan Villatoro office) is requesting results from the pt's colo guard to to be faxed to [phone]. She states that the fax has been received but the results were not enclosed.  Best contact number: [phone]

## 2019-02-13 ENCOUNTER — HOSPITAL ENCOUNTER (OUTPATIENT)
Age: 59
Setting detail: OUTPATIENT SURGERY
Discharge: HOME | End: 2019-02-13
Attending: INTERNAL MEDICINE | Admitting: INTERNAL MEDICINE
Payer: MEDICARE

## 2019-02-13 ENCOUNTER — ANESTHESIA EVENT (OUTPATIENT)
Dept: ENDOSCOPY | Age: 59
End: 2019-02-13
Payer: MEDICARE

## 2019-02-13 ENCOUNTER — ANESTHESIA (OUTPATIENT)
Dept: ENDOSCOPY | Age: 59
End: 2019-02-13
Payer: MEDICARE

## 2019-02-13 VITALS
BODY MASS INDEX: 46.45 KG/M2 | WEIGHT: 289 LBS | TEMPERATURE: 97.7 F | SYSTOLIC BLOOD PRESSURE: 116 MMHG | OXYGEN SATURATION: 100 % | DIASTOLIC BLOOD PRESSURE: 77 MMHG | HEART RATE: 66 BPM | RESPIRATION RATE: 19 BRPM | HEIGHT: 66 IN

## 2019-02-13 PROCEDURE — 76060000032 HC ANESTHESIA 0.5 TO 1 HR: Performed by: INTERNAL MEDICINE

## 2019-02-13 PROCEDURE — 88305 TISSUE EXAM BY PATHOLOGIST: CPT

## 2019-02-13 PROCEDURE — 74011250636 HC RX REV CODE- 250/636

## 2019-02-13 PROCEDURE — 76040000007: Performed by: INTERNAL MEDICINE

## 2019-02-13 PROCEDURE — 74011250636 HC RX REV CODE- 250/636: Performed by: INTERNAL MEDICINE

## 2019-02-13 PROCEDURE — 77030009426 HC FCPS BIOP ENDOSC BSC -B: Performed by: INTERNAL MEDICINE

## 2019-02-13 RX ORDER — FENTANYL CITRATE 50 UG/ML
25 INJECTION, SOLUTION INTRAMUSCULAR; INTRAVENOUS
Status: DISCONTINUED | OUTPATIENT
Start: 2019-02-13 | End: 2019-02-13 | Stop reason: HOSPADM

## 2019-02-13 RX ORDER — NALOXONE HYDROCHLORIDE 0.4 MG/ML
0.4 INJECTION, SOLUTION INTRAMUSCULAR; INTRAVENOUS; SUBCUTANEOUS
Status: DISCONTINUED | OUTPATIENT
Start: 2019-02-13 | End: 2019-02-13 | Stop reason: HOSPADM

## 2019-02-13 RX ORDER — DEXTROMETHORPHAN/PSEUDOEPHED 2.5-7.5/.8
1.2 DROPS ORAL
Status: DISCONTINUED | OUTPATIENT
Start: 2019-02-13 | End: 2019-02-13 | Stop reason: HOSPADM

## 2019-02-13 RX ORDER — MIDAZOLAM HYDROCHLORIDE 1 MG/ML
1-2 INJECTION, SOLUTION INTRAMUSCULAR; INTRAVENOUS
Status: DISCONTINUED | OUTPATIENT
Start: 2019-02-13 | End: 2019-02-13 | Stop reason: HOSPADM

## 2019-02-13 RX ORDER — SODIUM CHLORIDE 0.9 % (FLUSH) 0.9 %
5-40 SYRINGE (ML) INJECTION AS NEEDED
Status: DISCONTINUED | OUTPATIENT
Start: 2019-02-13 | End: 2019-02-13 | Stop reason: HOSPADM

## 2019-02-13 RX ORDER — MIDAZOLAM HYDROCHLORIDE 1 MG/ML
.25-5 INJECTION, SOLUTION INTRAMUSCULAR; INTRAVENOUS
Status: DISCONTINUED | OUTPATIENT
Start: 2019-02-13 | End: 2019-02-13 | Stop reason: HOSPADM

## 2019-02-13 RX ORDER — SODIUM CHLORIDE 9 MG/ML
75 INJECTION, SOLUTION INTRAVENOUS CONTINUOUS
Status: DISCONTINUED | OUTPATIENT
Start: 2019-02-13 | End: 2019-02-13 | Stop reason: HOSPADM

## 2019-02-13 RX ORDER — SODIUM CHLORIDE 0.9 % (FLUSH) 0.9 %
5-40 SYRINGE (ML) INJECTION EVERY 8 HOURS
Status: DISCONTINUED | OUTPATIENT
Start: 2019-02-13 | End: 2019-02-13 | Stop reason: HOSPADM

## 2019-02-13 RX ORDER — PROPOFOL 10 MG/ML
INJECTION, EMULSION INTRAVENOUS AS NEEDED
Status: DISCONTINUED | OUTPATIENT
Start: 2019-02-13 | End: 2019-02-13 | Stop reason: HOSPADM

## 2019-02-13 RX ORDER — FLUMAZENIL 0.1 MG/ML
0.2 INJECTION INTRAVENOUS
Status: DISCONTINUED | OUTPATIENT
Start: 2019-02-13 | End: 2019-02-13 | Stop reason: HOSPADM

## 2019-02-13 RX ORDER — EPINEPHRINE 0.1 MG/ML
1 INJECTION INTRACARDIAC; INTRAVENOUS
Status: DISCONTINUED | OUTPATIENT
Start: 2019-02-13 | End: 2019-02-13 | Stop reason: HOSPADM

## 2019-02-13 RX ORDER — LIDOCAINE HYDROCHLORIDE 20 MG/ML
INJECTION, SOLUTION EPIDURAL; INFILTRATION; INTRACAUDAL; PERINEURAL AS NEEDED
Status: DISCONTINUED | OUTPATIENT
Start: 2019-02-13 | End: 2019-02-13 | Stop reason: HOSPADM

## 2019-02-13 RX ORDER — ATROPINE SULFATE 0.1 MG/ML
0.5 INJECTION INTRAVENOUS
Status: DISCONTINUED | OUTPATIENT
Start: 2019-02-13 | End: 2019-02-13 | Stop reason: HOSPADM

## 2019-02-13 RX ADMIN — LIDOCAINE HYDROCHLORIDE 100 MG: 20 INJECTION, SOLUTION EPIDURAL; INFILTRATION; INTRACAUDAL; PERINEURAL at 10:21

## 2019-02-13 RX ADMIN — PROPOFOL 580 MG: 10 INJECTION, EMULSION INTRAVENOUS at 10:45

## 2019-02-13 RX ADMIN — SODIUM CHLORIDE 75 ML/HR: 900 INJECTION, SOLUTION INTRAVENOUS at 10:01

## 2019-02-13 NOTE — PERIOP NOTES
Both upper and lower Endoscopes were pre-cleaned at the bedside immediately following procedure by Carolina BYRD

## 2019-02-13 NOTE — ANESTHESIA PREPROCEDURE EVALUATION
Anesthetic History No history of anesthetic complications Review of Systems / Medical History Patient summary reviewed, nursing notes reviewed and pertinent labs reviewed Pulmonary Within defined limits Neuro/Psych Within defined limits Cardiovascular Hypertension Exercise tolerance: >4 METS 
  
GI/Hepatic/Renal 
  
GERD Endo/Other Morbid obesity Other Findings Physical Exam 
 
Airway Mallampati: II 
TM Distance: 4 - 6 cm Neck ROM: normal range of motion Mouth opening: Normal 
 
 Cardiovascular Regular rate and rhythm,  S1 and S2 normal,  no murmur, click, rub, or gallop Dental 
 
Dentition: Poor dentition Pulmonary Breath sounds clear to auscultation Abdominal 
GI exam deferred Other Findings Anesthetic Plan ASA: 3 Anesthesia type: total IV anesthesia and MAC Induction: Intravenous Anesthetic plan and risks discussed with: Patient

## 2019-02-13 NOTE — PERIOP NOTES
Anesthesia reports 580mg Propofol, 100mg Lidocaine and 450mL NS given during procedure. Received report from anesthesia staff on vital signs and status of patient.

## 2019-02-13 NOTE — PROCEDURES
[place] Office: [phone]      Esophagogastroduodenoscopy Procedure Note      Michael Quiroz  1960  [id number]    Indication: melena     : Otis Carrizales MD    Referring Provider:  Guerrero Henry MD    Sedation:  MAC anesthesia Propofol    Procedure Details:  After detailed informed consent was obtained for the procedure, with all risks and benefits of procedure explained the patient was taken to the endoscopy suite and placed in the left lateral decubitus position. Following sequential administration of sedation as per above, the endoscope was inserted into the mouth and advanced under direct vision to second portion of the duodenum. A careful inspection was made as the gastroscope was withdrawn, including a retroflexed view of the proximal stomach; findings and interventions are described below. Findings:     Esophagus: The esophageal mucosa in the proximal, mid and distal esophagus is normal.   The squamo-columnar junction is at 39 cm where the Z-line was noted. There is a 2 cm hiatal hernia. Stomach: The gastric mucosa has erythema in the body with a few fundic gland appearing polyps. Biopsies taken of the mucosa and the polyps. Duodenum:   The bulb and post bulbar mucosa is normal in appearance. Biopsies are taken. Therapies:  biopsy of stomach body, polyps  biopsy of duodenal second portion    Specimen:  Specimens were collected as described and send to the laboratory. Complications:   None were encountered during the procedure. EBL:  None. Recommendations:     -Continue acid suppression. ,   -Await pathology. ,   -Follow symptoms. ,   -No NSAIDS      Mahendra Fletcher MD  2/13/2019  10:28 AM

## 2019-02-13 NOTE — DISCHARGE INSTRUCTIONS
[place] Office: [phone]    Joey Rowley  [id number]  1960    EGD/COLONOSCOPY DISCHARGE INSTRUCTIONS  Discomfort:  Sore throat- throat lozenges or warm salt water gargle  redness at IV site- apply warm compress to area; if redness or soreness persist- contact your physician  Gaseous discomfort- walking, belching will help relieve any discomfort  You may not operate a vehicle for 12 hours  You may not engage in an occupation involving machinery or appliances for rest of today. You may not drink alcoholic beverages for at least 12 hours  Avoid making any critical decisions for at least 24 hour  DIET  You may resume your regular diet - however -  remember your colon is empty and a heavy meal will produce gas. Avoid these foods:  fried / greasy foods, excessive carbonated drinks or too much caffeine  MEDICATIONS   Regarding Aspirin or Nonsteroidal medications specifically, please see below. ACTIVITY  You may resume your normal daily activities. Spend the remainder of the day resting -  avoid any strenuous activity. CALL M.D. ANY SIGN OF   Increasing pain, nausea, vomiting  Abdominal distension (swelling)  New increased bleeding (oral or rectal)  Fever (chills)  Pain in chest area  Bloody discharge from nose or mouth  Shortness of breath    You may not take any Advil, Aspirin, Ibuprofen, Motrin, Aleve, or Goodys for 7 days, ONLY  Tylenol as needed for pain. Follow-up Instructions:   Call  Mahendra Luis MD for any questions or concerns  Results of procedure / biopsy in 7 days   Telephone # [phone]      Follow-up Information    None

## 2019-02-13 NOTE — ANESTHESIA POSTPROCEDURE EVALUATION
Procedure(s): 
COLONOSCOPY 
ESOPHAGOGASTRODUODENOSCOPY (EGD) ESOPHAGOGASTRODUODENAL (EGD) BIOPSY ENDOSCOPIC POLYPECTOMY. Anesthesia Post Evaluation Patient location during evaluation: PACU Note status: Adequate. Level of consciousness: responsive to verbal stimuli and sleepy but conscious Pain management: satisfactory to patient Airway patency: patent Anesthetic complications: no 
Cardiovascular status: acceptable Respiratory status: acceptable Hydration status: acceptable Comments: +Post-Anesthesia Evaluation and Assessment Patient: Michael Quiroz MRN: [id number]  SSN: [id number] YOB: 1960  Age: 63 y.o. Sex: female Cardiovascular Function/Vital Signs /77   Pulse 66   Temp 36.5 °C (97.7 °F)   Resp 19   Ht 5' 6\" (1.676 m)   Wt 131.1 kg (289 lb)   SpO2 100%   BMI 46.65 kg/m² Patient is status post Procedure(s): 
COLONOSCOPY 
ESOPHAGOGASTRODUODENOSCOPY (EGD) ESOPHAGOGASTRODUODENAL (EGD) BIOPSY ENDOSCOPIC POLYPECTOMY. Nausea/Vomiting: Controlled. Postoperative hydration reviewed and adequate. Pain: 
Pain Scale 1: Numeric (0 - 10) (02/13/19 1112) Pain Intensity 1: 0 (02/13/19 1112) Managed. Neurological Status: At baseline. Mental Status and Level of Consciousness: Arousable. Pulmonary Status:  
O2 Device: Room air (02/13/19 1117) Adequate oxygenation and airway patent. Complications related to anesthesia: None Post-anesthesia assessment completed. No concerns. Signed By: Aris Jaramillo DO  
 2/13/2019 Post anesthesia nausea and vomiting:  controlled Visit Vitals /77 Pulse 66 Temp 36.5 °C (97.7 °F) Resp 19 Ht 5' 6\" (1.676 m) Wt 131.1 kg (289 lb) SpO2 100% BMI 46.65 kg/m²

## 2019-02-13 NOTE — PROCEDURES
Colonoscopy Procedure Note    [facility]  1960  [id number]    Indications:  Please see below. Pre-operative Diagnosis: Occult blood in stool [K92.1]    Post-operative Diagnosis: sigmoid colon polyp,internal hemorrhoids      : Mahendra Valdez MD    Referring Provider: Andrés Gaming MD    Sedation:  MAC anesthesia Propofol        Procedure Details:    After detailed informed consent was obtained with all risks and benefits of procedure explained and preoperative exam completed, the patient was taken to the endoscopy suite and placed in the left lateral decubitus position. Upon sequential sedation as per above, a digital rectal exam was performed  And was normal.  The Olympus videocolonoscope  was inserted in the rectum and carefully advanced to the cecum, which was identified by the ileocecal valve and appendiceal orifice. The quality of preparation was good. The colonoscope was slowly withdrawn with careful evaluation between folds. Retroflexion in the rectum was performed. Findings:   · Her colon is mildly redundant, using a pediatric scope. She needed abdominal pressure to get the scope to the cecum. · TI could not be intubated despite trying. · There are no masses seen. · A small, 4 mm, sessile sigmoid colon polyp was removed with a cold biopsy forceps. · Medium sized internal hemorrhoids with anal papillae noted. Therapies:  biopsy of  sigmoid colon polyp    Specimen:  Specimens were collected as described above and sent to pathology. Complications: None were encountered during the procedure. EBL:  None. Recommendations:     -Await pathology. -Repeat colonoscopy in 5 years.  -Annual fecal occult blood testing.  -Naturally, for new bleeding, unexplained weight loss,change in bowel habits and anemia, an earlier colonoscopy should be considered. Mahendra Valdez MD  2/13/2019  10:45 AM

## 2019-02-13 NOTE — H&P
Pre-endoscopy H and P The patient was seen and examined in the room/pre-op holding area. The airway was assessed and documented. The problem list, past medical history, and medications were reviewed. Patient Active Problem List  
Diagnosis Code  Cancer ([facility].) C80.1  Plantar fasciitis M72.2  Hypovitaminosis D E55.9  Obesity, morbid ([place] [address].) E66.01  
 Gastroesophageal reflux disease without esophagitis K21.9 Social History Socioeconomic History  Marital status:  Spouse name: Not on file  Number of children: Not on file  Years of education: Not on file  Highest education level: Not on file Social Needs  Financial resource strain: Not on file  Food insecurity - worry: Not on file  Food insecurity - inability: Not on file  Transportation needs - medical: Not on file  Transportation needs - non-medical: Not on file Occupational History  Not on file Tobacco Use  Smoking status: Never Smoker  Smokeless tobacco: Never Used Substance and Sexual Activity  Alcohol use: Yes Alcohol/week: 1.5 oz Types: 3 Cans of beer per week Comment: socially  Drug use: No  
 Sexual activity: Yes  
  Partners: Male Birth control/protection: None Other Topics Concern  Not on file Social History Narrative  Not on file Past Medical History:  
Diagnosis Date  Cancer ([facility].) 1986  
 cervical  
 GERD (gastroesophageal reflux disease)  Hypertension  Ill-defined condition Fibromyalgia Prior to Admission Medications Prescriptions Last Dose Informant Patient Reported? Taking?  
acetaminophen-codeine (TYLENOL #3) 300-30 mg per tablet Not Taking at Unknown time  No No  
Sig: Take 1 Tab by mouth every four (4) hours as needed for Pain. Max Daily Amount: 6 Tabs. Indications: Pain  
diclofenac (VOLTAREN) 1 % gel 2/6/2019 at Unknown time  No Yes Sig: Apply 2 g to affected area four (4) times daily. lisinopril-hydroCHLOROthiazide (PRINZIDE, ZESTORETIC) 20-12.5 mg per tablet 2/13/2019 at Unknown time  No Yes Sig: Take 2 Tabs by mouth daily. omeprazole (PRILOSEC) 40 mg capsule 2/12/2019 at Unknown time  No Yes Sig: Take 1 Cap by mouth daily. rosuvastatin (CRESTOR) 20 mg tablet 2/12/2019 at Unknown time  No Yes Sig: Take 1 Tab by mouth daily. sertraline (ZOLOFT) 100 mg tablet 2/12/2019 at Unknown time  No Yes Sig: Take 1 Tab by mouth daily. Indications: ANXIETY WITH DEPRESSION  
sertraline (ZOLOFT) 50 mg tablet 2/12/2019 at Unknown time  No Yes Sig: Take 1 Tab by mouth daily. tiZANidine (ZANAFLEX) 2 mg tablet Not Taking at Unknown time  No No  
Sig: TAKE 1 TABLET BY MOUTH EVERY 8 HOURS AS NEEDED  
tiZANidine (ZANAFLEX) 2 mg tablet Not Taking at Unknown time  No No  
Sig: TAKE 1 TABLET BY MOUTH EVERY 8 HOURS AS NEEDED Facility-Administered Medications: None The review of systems is:  Negative  for shortness of breath or chest pain The heart, lungs, and mental status were satisfactory for the administration of deep sedation and for the procedure. I discussed with the patient the objectives, risks, consequences and alternatives to the procedure.    
 
Luis Barger MD 
2/13/2019 
10:11 AM

## 2019-02-13 NOTE — ROUTINE PROCESS
Ariel Sami 1960 
[id number] Situation: 
Verbal report received from: Juan Luis Montero RN Procedure: Procedure(s): 
COLONOSCOPY 
ESOPHAGOGASTRODUODENOSCOPY (EGD) ESOPHAGOGASTRODUODENAL (EGD) BIOPSY ENDOSCOPIC POLYPECTOMY Background: 
 
Preoperative diagnosis: Abdominal pain, epigastric [R10.13] Gastroesophageal reflux disease, esophagitis presence not specified [K21.9] Blood in stool [K92.1] Postoperative diagnosis: EGD- Sm Hiatel Hernia, Gastritis, Gastric Polyp. Colon- Internal Hemorrhoids and Polyp :  Dr. Temple Hodgkins 
Assistant(s): Endoscopy RN-1: Mehreen Siddiqui Specimens:  
ID Type Source Tests Collected by Time Destination 1 : Duodenum Biopsy Preservative Duodenum  Kassandra Garcia MD 2/13/2019 1030 Pathology 2 : Gastric and Gastric Polyp Biopsies Preservative Gastric  Kassandra Garcia MD 2/13/2019 0119 Pathology 3 : Sigmoid Colon Polyp Preservative Sigmoid  Kassandra Garcia MD 2/13/2019 1046 Pathology H. Pylori  no Assessment: 
Intra-procedure medications Anesthesia gave intra-procedure sedation and medications, see anesthesia flow sheet yes Intravenous fluids: NS@ Clydene Vickie Vital signs stable Abdominal assessment: round and soft Recommendation: 
Discharge patient per MD order. Family or Morongo Matter  Permission to share finding with family or friend yes

## 2019-02-20 DIAGNOSIS — I10 HYPERTENSION GOAL BP (BLOOD PRESSURE) < 130/80: ICD-10-CM

## 2019-02-21 RX ORDER — LISINOPRIL AND HYDROCHLOROTHIAZIDE 12.5; 2 MG/1; MG/1
TABLET ORAL
Qty: 60 TAB | Refills: 3 | Status: SHIPPED | OUTPATIENT
Start: 2019-02-21 | End: 2020-08-21 | Stop reason: ALTCHOICE

## 2020-02-13 ENCOUNTER — HOSPITAL ENCOUNTER (OUTPATIENT)
Dept: MAMMOGRAPHY | Age: 60
Discharge: HOME | End: 2020-02-13
Attending: NURSE PRACTITIONER
Payer: MEDICARE

## 2020-02-13 DIAGNOSIS — N64.4 BREAST PAIN, RIGHT: ICD-10-CM

## 2020-02-13 PROCEDURE — 77066 DX MAMMO INCL CAD BI: CPT

## 2020-04-06 ENCOUNTER — HOSPITAL ENCOUNTER (OUTPATIENT)
Dept: MAMMOGRAPHY | Age: 60
Discharge: HOME | End: 2020-04-06
Attending: NURSE PRACTITIONER

## 2020-04-06 DIAGNOSIS — N64.4 PAINFUL BREASTS: ICD-10-CM

## 2020-04-06 NOTE — PROGRESS NOTES
I left a message with office to return my call re:  Procedure being cancelled. After speaking with Dr. Kunal Samuels, his recommendation is refer to a breast specialist.  I gave Ms. Oscar Riojas a card to [employer].

## 2020-04-15 ENCOUNTER — OFFICE VISIT (OUTPATIENT)
Dept: SURGERY | Age: 60
End: 2020-04-15

## 2020-04-15 VITALS
HEART RATE: 85 BPM | WEIGHT: 275 LBS | DIASTOLIC BLOOD PRESSURE: 98 MMHG | TEMPERATURE: 97.7 F | BODY MASS INDEX: 44.2 KG/M2 | HEIGHT: 66 IN | SYSTOLIC BLOOD PRESSURE: 172 MMHG

## 2020-04-15 DIAGNOSIS — N64.4 BREAST PAIN, RIGHT: Primary | ICD-10-CM

## 2020-04-15 RX ORDER — GABAPENTIN 100 MG/1
100 CAPSULE ORAL DAILY
COMMUNITY
End: 2020-08-21 | Stop reason: ALTCHOICE

## 2020-04-15 RX ORDER — LOSARTAN POTASSIUM AND HYDROCHLOROTHIAZIDE 25; 100 MG/1; MG/1
1 TABLET ORAL DAILY
COMMUNITY
Start: 2020-02-11 | End: 2020-08-21 | Stop reason: ALTCHOICE

## 2020-04-15 RX ORDER — CLINDAMYCIN HYDROCHLORIDE 150 MG/1
CAPSULE ORAL
COMMUNITY
Start: 2020-03-16 | End: 2020-08-21 | Stop reason: ALTCHOICE

## 2020-04-15 NOTE — PROGRESS NOTES
HISTORY OF PRESENT ILLNESS Rayshawn Baker is a 62 y.o. female. HPI NEW patient referral for consultation by Eliz Oliveira NP [facility]) for RIGHT axillary pain and swelling, along with a reddened area on her RIGHT breast. The patient has had the reddened area on the breast since 2/2020 when she was diagnosed with shingles. About 2 weeks ago she started having pain and swelling in the RIGHT axilla area. She said she has two skin lesions under that arm as well. She was treated with clindamycin but it has not worked. .Menarche 16, LMP 55, # of children 1, age of 4st delivery 30, Hysterectomy/oophorectomy no/no, Breast bx no, history of breast feeding no, BCP yes, Hormone therapy no 
 history - no breast or ovarian cancer. [facility] Results (most recent): 
Results from Hospital Encounter encounter on 02/13/20 [facility] MAMMO BI DX INCL CAD Narrative INDICATION: Right breast pain with erythema and warmth. COMPARISON: 2018 and prior. FINDINGS:  
Bilateral digital diagnostic mammography, interpreted in conjunction with CAD 
overread, demonstrates no abscess, suspicious calcifications, or mass. There is 
no significant change since prior studies. Breast Composition: Scattered fibroglandular densities. Impression IMPRESSION: 
1. BIRADS: 1, NEGATIVE. 2. No apparent malignancy or abscess. 3. Annual bilateral screening mammogram followup is advised. 4. The patient is informed. [facility] Physical Exam 
 
ASSESSMENT and PLAN 
{ASSESSMENT/PLAN:31157}

## 2020-04-15 NOTE — LETTER
4/16/2020 9:57 AM 
 
Patient:  Maged Mckeon YOB: 1960 Date of Visit: 4/15/2020 Dear Jeannie Dorman: Thank you for referring Ms. Maged Mckeon to me for evaluation/treatment. Below are the relevant portions of my assessment and plan of care. If you have questions, please do not hesitate to call me. I look forward to following Ms. Romelia Blanco along with you. Sincerely, 
 
Dallin Hogue MD

## 2020-04-15 NOTE — PROGRESS NOTES
HISTORY OF PRESENT ILLNESS  Pedro Evans is a 62 y.o. female. HPI  NEW patient referral for consultation by Haroon Howard NP [facility]) for RIGHT axillary pain and swelling, along with a reddened area on her RIGHT breast. The patient has had the reddened area on the breast since 2020 when she was diagnosed with shingles. About 2 weeks ago she started having pain and swelling in the RIGHT axilla area. She said she has two skin lesions under that arm as well. She was treated with clindamycin but it has not worked. .Menarche 16, LMP 55, # of children 1, age of 4st delivery 30, Hysterectomy/oophorectomy no/no, Breast bx no, history of breast feeding no, BCP yes, Hormone therapy no     history - no breast or ovarian cancer. [facility] Results (most recent):       Results from [place] encounter on 20   [facility] MAMMO BI DX INCL CAD     Narrative INDICATION: Right breast pain with erythema and warmth.     COMPARISON:  and prior.     FINDINGS:   Bilateral digital diagnostic mammography, interpreted in conjunction with CAD  overread, demonstrates no abscess, suspicious calcifications, or mass. There is  no significant change since prior studies.     Breast Composition: Scattered fibroglandular densities.        Impression IMPRESSION:  1. BIRADS: 1, NEGATIVE. 2. No apparent malignancy or abscess. 3. Annual bilateral screening mammogram followup is advised.   4. The patient is informed.      Past Medical History:   Diagnosis Date    Cancer ([facility] [address]     cervical    GERD (gastroesophageal reflux disease)     Hypertension     Ill-defined condition     Fibromyalgia       Past Surgical History:   Procedure Laterality Date    COLONOSCOPY N/A 2019    COL[facility] ENDOSCOPY    HX  SECTION      HX GYN      cone resection       Social History     Socioeconomic History    Marital status:      Spouse name: Not on file    Number of children: Not on file    Years of education: Not on file    Highest education level: Not on file   Occupational History    Not on file   Social Needs    Financial resource strain: Not on file    Food insecurity     Worry: Not on file     Inability: Not on file    Transportation needs     Medical: Not on file     Non-medical: Not on file   Tobacco Use    Smoking status: Never Smoker    Smokeless tobacco: Never Used   Substance and Sexual Activity    Alcohol use: Yes     Alcohol/week: 2.5 standard drinks     Types: 3 Cans of beer per week     Comment: socially    Drug use: No    Sexual activity: Yes     Partners: Male     Birth control/protection: None   Lifestyle    Physical activity     Days per week: Not on file     Minutes per session: Not on file    Stress: Not on file   Relationships    Social connections     Talks on phone: Not on file     Gets together: Not on file     Attends Worship service: Not on file     Active member of club or organization: Not on file     Attends meetings of clubs or organizations: Not on file     Relationship status: Not on file    Intimate partner violence     Fear of current or ex partner: Not on file     Emotionally abused: Not on file     Physically abused: Not on file     Forced sexual activity: Not on file   Other Topics Concern    Not on file   Social History Narrative    Not on file       Current Outpatient Medications on File Prior to Visit   Medication Sig Dispense Refill    losartan-hydroCHLOROthiazide (HYZAAR) 100-25 mg per tablet Take 1 Tab by mouth daily.  gabapentin (NEURONTIN) 100 mg capsule Take 100 mg by mouth daily.  sertraline (ZOLOFT) 100 mg tablet Take 1 Tab by mouth daily. Indications: ANXIETY WITH DEPRESSION 30 Tab 1    rosuvastatin (CRESTOR) 20 mg tablet Take 1 Tab by mouth daily.  90 Tab 1    tiZANidine (ZANAFLEX) 2 mg tablet TAKE 1 TABLET BY MOUTH EVERY 8 HOURS AS NEEDED 20 Tab 1    omeprazole (PRILOSEC) 40 mg capsule Take 1 Cap by mouth daily. 90 Cap 3    diclofenac (VOLTAREN) 1 % gel Apply 2 g to affected area four (4) times daily. 2 g 3    clindamycin (CLEOCIN) 150 mg capsule       lisinopril-hydroCHLOROthiazide (PRINZIDE, ZESTORETIC) 20-12.5 mg per tablet TAKE 2 TABLETS BY MOUTH ONCE DAILY 60 Tab 3    tiZANidine (ZANAFLEX) 2 mg tablet TAKE 1 TABLET BY MOUTH EVERY 8 HOURS AS NEEDED 20 Tab 1    [DISCONTINUED] acetaminophen-codeine (TYLENOL #3) 300-30 mg per tablet Take 1 Tab by mouth every four (4) hours as needed for Pain. Max Daily Amount: 6 Tabs. Indications: Pain 20 Tab 0    sertraline (ZOLOFT) 50 mg tablet Take 1 Tab by mouth daily. 90 Tab 3     No current facility-administered medications on file prior to visit. Allergies   Allergen Reactions    Pcn [Penicillins] Other (comments)     \"pass out\"    Bactrim [Sulfamethoprim] Other (comments)     \"migraines\"    Metoprolol Tartrate Shortness of Breath       OB History        1    Para        Term                AB        Living           SAB        TAB        Ectopic        Molar        Multiple        Live Births              Obstetric Comments   . Menarche 16, LMP 55, # of children 1, age of 4st delivery 30, Hysterectomy/oophorectomy no/no, Breast bx no, history of breast feeding no, BCP yes, Hormone therapy no             ROS    Physical Exam  Cardiovascular:      Rate and Rhythm: Normal rate and regular rhythm. Pulmonary:      Breath sounds: Normal breath sounds. Chest:      Breasts:         Right: Skin change and tenderness present. No swelling, bleeding, inverted nipple, mass or nipple discharge. Left: Normal. No swelling, bleeding, inverted nipple, mass, nipple discharge, skin change or tenderness. Lymphadenopathy:      Cervical: No cervical adenopathy. Upper Body:      Right upper body: No supraclavicular or axillary adenopathy. Left upper body: No supraclavicular or axillary adenopathy.        BREAST ULTRASOUND  Indication: RIGHT axilla and RIGHT breast LOQ swelling   Technique: The area was scanned using a high-frequency linear-array near-field transducer  Findings: 2 reactive LNs in the RIGHT axilla. RIGHT axillary breast tissue  Impression: Reactive LN likely from recent shingles   Disposition: Observation with f/u in 6-8 weeks    ASSESSMENT and PLAN  Encounter Diagnoses   Name Primary?  Breast pain, right Yes        New patient presents for evaluation of RIGHT breast and axilla pain/swelling, and is doing well overall. RIGHT breast has post-shingles scarring in the LOQ with associated burning pain, as well as axillary breast tissue. RIGHT axillary ultrasound visualizes two reactive LNs and axillary breast tissue. RIGHT breast pain and RIGHT axillary reactive LNs are likely due to shingles. Recommend observation over the next 6-8 weeks to allow shingles to completely heal before repeat US. This plan was reviewed with the patient and patient agrees. All questions were answered. Written by cathy Mckeon, as dictated by Marcell Gunn MD.

## 2020-04-15 NOTE — PATIENT INSTRUCTIONS
Breast Lumps: Care Instructions Your Care Instructions Breast lumps are common, especially in women between ages 27 and 48. Many women's breasts feel lumpy and tender before their menstrual period. Women also may have lumps when they are breastfeeding. Breast lumps may go away after menopause. All new breast lumps in women after menopause should be checked by a doctor. Although lumps may be normal for you, it is important to have your doctor check any lump or thickness that is not like the rest of your breast to make sure it is not cancer. A lump may be larger, harder, or different from the rest of your breast tissue. Follow-up care is a key part of your treatment and safety. Be sure to make and go to all appointments, and call your doctor if you are having problems. It's also a good idea to know your test results and keep a list of the medicines you take. How can you care for yourself at home? · Make an appointment to have a mammogram and other follow-up visits as recommended by your doctor. When should you call for help? Watch closely for changes in your health, and be sure to contact your doctor if: 
  · You do not get better as expected.  
  · Your breast has changed.  
  · You have pain in your breast.  
  · You have a discharge from your nipple.  
  · A breast lump changes or does not go away. Where can you learn more? Go to http://dani-benson.info/ Enter Z281 in the search box to learn more about \"Breast Lumps: Care Instructions. \" Current as of: November 7, 2019Content Version: 12.4 © 2656-2105 Healthwise, Incorporated. Care instructions adapted under license by [employer] (which disclaims liability or warranty for this information). If you have questions about a medical condition or this instruction, always ask your healthcare professional. [address] any warranty or liability for your use of this information. Breast Lumps: Care Instructions Your Care Instructions Breast lumps are common, especially in women between ages 27 and 48. Many women's breasts feel lumpy and tender before their menstrual period. Women also may have lumps when they are breastfeeding. Breast lumps may go away after menopause. All new breast lumps in women after menopause should be checked by a doctor. Although lumps may be normal for you, it is important to have your doctor check any lump or thickness that is not like the rest of your breast to make sure it is not cancer. A lump may be larger, harder, or different from the rest of your breast tissue. Follow-up care is a key part of your treatment and safety. Be sure to make and go to all appointments, and call your doctor if you are having problems. It's also a good idea to know your test results and keep a list of the medicines you take. How can you care for yourself at home? · Make an appointment to have a mammogram and other follow-up visits as recommended by your doctor. When should you call for help? Watch closely for changes in your health, and be sure to contact your doctor if: 
  · You do not get better as expected.  
  · Your breast has changed.  
  · You have pain in your breast.  
  · You have a discharge from your nipple.  
  · A breast lump changes or does not go away. Where can you learn more? Go to http://dani-benson.info/ Enter A713 in the search box to learn more about \"Breast Lumps: Care Instructions. \" Current as of: November 7, 2019Content Version: 12.4 © 5944-7506 Healthwise, Incorporated. Care instructions adapted under license by [employer] (which disclaims liability or warranty for this information). If you have questions about a medical condition or this instruction, always ask your healthcare professional. [address] any warranty or liability for your use of this information.

## 2020-08-21 ENCOUNTER — OFFICE VISIT (OUTPATIENT)
Dept: SURGERY | Age: 60
End: 2020-08-21
Payer: MEDICARE

## 2020-08-21 VITALS
HEIGHT: 66 IN | SYSTOLIC BLOOD PRESSURE: 140 MMHG | DIASTOLIC BLOOD PRESSURE: 96 MMHG | BODY MASS INDEX: 44.2 KG/M2 | TEMPERATURE: 97.6 F | HEART RATE: 99 BPM | WEIGHT: 275 LBS

## 2020-08-21 DIAGNOSIS — B02.29 POST HERPETIC NEURALGIA: Primary | ICD-10-CM

## 2020-08-21 PROCEDURE — G9899 SCRN MAM PERF RSLTS DOC: HCPCS | Performed by: SURGERY

## 2020-08-21 PROCEDURE — 76642 ULTRASOUND BREAST LIMITED: CPT | Performed by: SURGERY

## 2020-08-21 PROCEDURE — G8419 CALC BMI OUT NRM PARAM NOF/U: HCPCS | Performed by: SURGERY

## 2020-08-21 PROCEDURE — G8427 DOCREV CUR MEDS BY ELIG CLIN: HCPCS | Performed by: SURGERY

## 2020-08-21 PROCEDURE — 3017F COLORECTAL CA SCREEN DOC REV: CPT | Performed by: SURGERY

## 2020-08-21 PROCEDURE — G8432 DEP SCR NOT DOC, RNG: HCPCS | Performed by: SURGERY

## 2020-08-21 PROCEDURE — 99213 OFFICE O/P EST LOW 20 MIN: CPT | Performed by: SURGERY

## 2020-08-21 RX ORDER — GABAPENTIN 100 MG/1
100 CAPSULE ORAL 3 TIMES DAILY
Qty: 90 CAP | Refills: 1 | Status: SHIPPED | OUTPATIENT
Start: 2020-08-21 | End: 2021-01-15 | Stop reason: SDUPTHER

## 2020-08-21 RX ORDER — HYDROCHLOROTHIAZIDE 12.5 MG/1
12.5 CAPSULE ORAL DAILY
COMMUNITY
End: 2021-06-22

## 2020-08-21 NOTE — PROGRESS NOTES
HISTORY OF PRESENT ILLNESS Ceferino Bailey is a 62 y.o. female. HPI  ESTABLISHED patient here for 4 month follow up RIGHT axillary swelling and pain. Continues to have swelling and pain. The redness to the axilla has improved. [facility] Results (most recent): 
Results from Hospital Encounter encounter on 02/13/20 [facility] MAMMO BI DX INCL CAD Narrative INDICATION: Right breast pain with erythema and warmth. COMPARISON: 2018 and prior. FINDINGS:  
Bilateral digital diagnostic mammography, interpreted in conjunction with CAD 
overread, demonstrates no abscess, suspicious calcifications, or mass. There is 
no significant change since prior studies. Breast Composition: Scattered fibroglandular densities. Impression IMPRESSION: 
1. BIRADS: 1, NEGATIVE. 2. No apparent malignancy or abscess. 3. Annual bilateral screening mammogram followup is advised. 4. The patient is informed. [facility] Physical Exam 
 
ASSESSMENT and PLAN 
{ASSESSMENT/PLAN:12545}

## 2020-08-21 NOTE — PROGRESS NOTES
HISTORY OF PRESENT ILLNESS  Murphy Bhatti is a 62 y.o. female. HPI  ESTABLISHED patient here for 4 month follow up RIGHT axillary swelling and pain. Continues to have swelling and pain. The redness to the axilla has improved.        [facility] Results (most recent):       Results from Hospital Encounter encounter on 20   [facility] MAMMO BI DX INCL CAD     Narrative INDICATION: Right breast pain with erythema and warmth.     COMPARISON:  and prior.     FINDINGS:   Bilateral digital diagnostic mammography, interpreted in conjunction with CAD  overread, demonstrates no abscess, suspicious calcifications, or mass. There is  no significant change since prior studies.     Breast Composition: Scattered fibroglandular densities.        Impression IMPRESSION:  1. BIRADS: 1, NEGATIVE. 2. No apparent malignancy or abscess. 3. Annual bilateral screening mammogram followup is advised. 4. The patient is informed. Past Medical History:   Diagnosis Date    Cancer [facility])     cervical    GERD (gastroesophageal reflux disease)     Hypertension     Ill-defined condition     Fibromyalgia       Past Surgical History:   Procedure Laterality Date    COLONOSCOPY N/A 2019    COLONOSCOPY performed by Erica Vazquez [facility] ENDOSCOPY    HX  SECTION      HX GYN      cone resection       Social History     Socioeconomic History    Marital status:      Spouse name: Not on file    Number of children: Not on file    Years of education: Not on file    Highest education level: Not on file   Occupational History    Not on file   Social Needs    Financial resource strain: Not on file    Food insecurity     Worry: Not on file     Inability: Not on file    Transportation needs     Medical: Not on file     Non-medical: Not on file   Tobacco Use    Smoking status: Never Smoker    Smokeless tobacco: Never Used   Substance and Sexual Activity    Alcohol use:  Yes     Alcohol/week: 2.5 standard drinks Types: 3 Cans of beer per week     Comment: socially    Drug use: No    Sexual activity: Yes     Partners: Male     Birth control/protection: None   Lifestyle    Physical activity     Days per week: Not on file     Minutes per session: Not on file    Stress: Not on file   Relationships    Social connections     Talks on phone: Not on file     Gets together: Not on file     Attends Rastafarian service: Not on file     Active member of club or organization: Not on file     Attends meetings of clubs or organizations: Not on file     Relationship status: Not on file    Intimate partner violence     Fear of current or ex partner: Not on file     Emotionally abused: Not on file     Physically abused: Not on file     Forced sexual activity: Not on file   Other Topics Concern    Not on file   Social History Narrative    Not on file       Current Outpatient Medications on File Prior to Visit   Medication Sig Dispense Refill    hydroCHLOROthiazide (MICROZIDE) 12.5 mg capsule Take 12.5 mg by mouth daily.  tiZANidine (ZANAFLEX) 2 mg tablet TAKE 1 TABLET BY MOUTH EVERY 8 HOURS AS NEEDED 20 Tab 1    sertraline (ZOLOFT) 100 mg tablet Take 1 Tab by mouth daily. Indications: ANXIETY WITH DEPRESSION 30 Tab 1    rosuvastatin (CRESTOR) 20 mg tablet Take 1 Tab by mouth daily. 90 Tab 1    omeprazole (PRILOSEC) 40 mg capsule Take 1 Cap by mouth daily. 90 Cap 3    diclofenac (VOLTAREN) 1 % gel Apply 2 g to affected area four (4) times daily. 2 g 3    [DISCONTINUED] losartan-hydroCHLOROthiazide (HYZAAR) 100-25 mg per tablet Take 1 Tab by mouth daily.  [DISCONTINUED] gabapentin (NEURONTIN) 100 mg capsule Take 100 mg by mouth daily.       [DISCONTINUED] clindamycin (CLEOCIN) 150 mg capsule       [DISCONTINUED] lisinopril-hydroCHLOROthiazide (PRINZIDE, ZESTORETIC) 20-12.5 mg per tablet TAKE 2 TABLETS BY MOUTH ONCE DAILY 60 Tab 3    [DISCONTINUED] tiZANidine (ZANAFLEX) 2 mg tablet TAKE 1 TABLET BY MOUTH EVERY 8 HOURS AS NEEDED 20 Tab 1    [DISCONTINUED] sertraline (ZOLOFT) 50 mg tablet Take 1 Tab by mouth daily. 90 Tab 3     No current facility-administered medications on file prior to visit. Allergies   Allergen Reactions    Pcn [Penicillins] Other (comments)     \"pass out\"    Bactrim [Sulfamethoprim] Other (comments)     \"migraines\"    Metoprolol Tartrate Shortness of Breath       OB History        1    Para        Term                AB        Living           SAB        TAB        Ectopic        Molar        Multiple        Live Births              Obstetric Comments   . Menarche 16, LMP 55, # of children 1, age of 4st delivery 30, Hysterectomy/oophorectomy no/no, Breast bx no, history of breast feeding no, BCP yes, Hormone therapy no             ROS    Physical Exam  Cardiovascular:      Rate and Rhythm: Normal rate and regular rhythm. Pulmonary:      Breath sounds: Normal breath sounds. Chest:      Breasts:         Right: Tenderness present. No swelling, bleeding, inverted nipple, mass, nipple discharge or skin change. Left: Normal. No swelling, bleeding, inverted nipple, mass, nipple discharge, skin change or tenderness. Lymphadenopathy:      Cervical: No cervical adenopathy. Upper Body:      Right upper body: No supraclavicular or axillary adenopathy. Left upper body: No supraclavicular or axillary adenopathy. BREAST ULTRASOUND  Indication: RIGHT axillary pain  Technique: The area was scanned using a high-frequency linear-array near-field transducer  Findings: No abnormal mass, lesion, or shadowing noted; no cysts; no axillary lymphadenopathy  Impression: Normal tissue  Disposition: No worrisome finding on ultrasound      ASSESSMENT and PLAN    ICD-10-CM ICD-9-CM    1. Post herpetic neuralgia  B02.29 053.19 gabapentin (NEURONTIN) 100 mg capsule      Established patient presents for evaluation of RIGHT axillary swelling and pain, and is doing well overall.  Physical exam today normal, with negative RIGHT axillary US. Postherpetic neuralgia of the RIGHT breast and axilla. Prescription given for gabapentin 100mg TID, and advised pt to follow up with her PCP. Pt due for mammogram in February. F/U with me PRN. This plan was reviewed with the patient and patient agrees. All questions were answered.     Written by Xenia Cade, as dictated by Dr. Ana Lilia Soto MD.

## 2020-08-21 NOTE — PATIENT INSTRUCTIONS

## 2021-06-16 ENCOUNTER — HOSPITAL ENCOUNTER (INPATIENT)
Age: 61
LOS: 6 days | Discharge: HOME | DRG: 871 | End: 2021-06-22
Attending: STUDENT IN AN ORGANIZED HEALTH CARE EDUCATION/TRAINING PROGRAM | Admitting: GENERAL ACUTE CARE HOSPITAL
Payer: MEDICARE

## 2021-06-16 ENCOUNTER — APPOINTMENT (OUTPATIENT)
Dept: CT IMAGING | Age: 61
DRG: 871 | End: 2021-06-16
Attending: STUDENT IN AN ORGANIZED HEALTH CARE EDUCATION/TRAINING PROGRAM
Payer: MEDICARE

## 2021-06-16 ENCOUNTER — APPOINTMENT (OUTPATIENT)
Dept: NUCLEAR MEDICINE | Age: 61
DRG: 871 | End: 2021-06-16
Attending: STUDENT IN AN ORGANIZED HEALTH CARE EDUCATION/TRAINING PROGRAM
Payer: MEDICARE

## 2021-06-16 ENCOUNTER — APPOINTMENT (OUTPATIENT)
Dept: GENERAL RADIOLOGY | Age: 61
DRG: 871 | End: 2021-06-16
Attending: STUDENT IN AN ORGANIZED HEALTH CARE EDUCATION/TRAINING PROGRAM
Payer: MEDICARE

## 2021-06-16 ENCOUNTER — APPOINTMENT (OUTPATIENT)
Dept: ULTRASOUND IMAGING | Age: 61
DRG: 871 | End: 2021-06-16
Attending: STUDENT IN AN ORGANIZED HEALTH CARE EDUCATION/TRAINING PROGRAM
Payer: MEDICARE

## 2021-06-16 DIAGNOSIS — E78.5 DYSLIPIDEMIA: ICD-10-CM

## 2021-06-16 DIAGNOSIS — R19.7 DIARRHEA, UNSPECIFIED TYPE: ICD-10-CM

## 2021-06-16 DIAGNOSIS — R10.11 RUQ PAIN: ICD-10-CM

## 2021-06-16 DIAGNOSIS — R74.01 TRANSAMINITIS: ICD-10-CM

## 2021-06-16 DIAGNOSIS — K80.20 CALCULUS OF GALLBLADDER WITHOUT CHOLECYSTITIS WITHOUT OBSTRUCTION: ICD-10-CM

## 2021-06-16 DIAGNOSIS — K21.9 GASTROESOPHAGEAL REFLUX DISEASE WITHOUT ESOPHAGITIS: ICD-10-CM

## 2021-06-16 DIAGNOSIS — K29.90 GASTRITIS AND DUODENITIS: ICD-10-CM

## 2021-06-16 DIAGNOSIS — I48.91 ATRIAL FIBRILLATION (HCC): ICD-10-CM

## 2021-06-16 DIAGNOSIS — K76.0 HEPATIC STEATOSIS: ICD-10-CM

## 2021-06-16 DIAGNOSIS — N17.9 AKI (ACUTE KIDNEY INJURY) (HCC): ICD-10-CM

## 2021-06-16 DIAGNOSIS — I48.91 ATRIAL FIBRILLATION WITH RVR (HCC): Primary | ICD-10-CM

## 2021-06-16 DIAGNOSIS — N17.9 ACUTE KIDNEY INJURY (HCC): ICD-10-CM

## 2021-06-16 DIAGNOSIS — E87.6 HYPOKALEMIA: ICD-10-CM

## 2021-06-16 DIAGNOSIS — J96.01 ACUTE RESPIRATORY FAILURE WITH HYPOXIA (HCC): ICD-10-CM

## 2021-06-16 LAB
ALBUMIN SERPL-MCNC: 3.2 G/DL (ref 3.5–5)
ALBUMIN/GLOB SERPL: 0.6 {RATIO} (ref 1.1–2.2)
ALP SERPL-CCNC: 108 U/L (ref 45–117)
ALT SERPL-CCNC: 80 U/L (ref 12–78)
ANION GAP SERPL CALC-SCNC: 11 MMOL/L (ref 5–15)
APPEARANCE UR: ABNORMAL
AST SERPL-CCNC: 64 U/L (ref 15–37)
ATRIAL RATE: 300 BPM
BACTERIA URNS QL MICRO: NEGATIVE /HPF
BASE DEFICIT BLD-SCNC: 0.2 MMOL/L
BASOPHILS # BLD: 0 K/UL (ref 0–0.1)
BASOPHILS NFR BLD: 0 % (ref 0–1)
BILIRUB SERPL-MCNC: 1.4 MG/DL (ref 0.2–1)
BILIRUB UR QL: NEGATIVE
BUN SERPL-MCNC: 33 MG/DL (ref 6–20)
BUN/CREAT SERPL: 16 (ref 12–20)
CALCIUM SERPL-MCNC: 8.7 MG/DL (ref 8.5–10.1)
CALCULATED R AXIS, ECG10: 44 DEGREES
CALCULATED T AXIS, ECG11: 85 DEGREES
CHLORIDE SERPL-SCNC: 99 MMOL/L (ref 97–108)
CO2 SERPL-SCNC: 24 MMOL/L (ref 21–32)
COLOR UR: ABNORMAL
COMMENT, HOLDF: NORMAL
COVID-19 RAPID TEST, COVR: NOT DETECTED
CREAT SERPL-MCNC: 2.05 MG/DL (ref 0.55–1.02)
D DIMER PPP FEU-MCNC: 3.3 MG/L FEU (ref 0–0.65)
DIAGNOSIS, 93000: NORMAL
DIFFERENTIAL METHOD BLD: ABNORMAL
EOSINOPHIL # BLD: 0 K/UL (ref 0–0.4)
EOSINOPHIL NFR BLD: 0 % (ref 0–7)
EPITH CASTS URNS QL MICRO: ABNORMAL /LPF
ERYTHROCYTE [DISTWIDTH] IN BLOOD BY AUTOMATED COUNT: 22 % (ref 11.5–14.5)
GLOBULIN SER CALC-MCNC: 5.3 G/DL (ref 2–4)
GLUCOSE SERPL-MCNC: 143 MG/DL (ref 65–100)
GLUCOSE UR STRIP.AUTO-MCNC: NEGATIVE MG/DL
HCO3 BLD-SCNC: 22.8 MMOL/L (ref 22–26)
HCT VFR BLD AUTO: 39.6 % (ref 35–47)
HGB BLD-MCNC: 12.8 G/DL (ref 11.5–16)
HGB UR QL STRIP: ABNORMAL
HYALINE CASTS URNS QL MICRO: ABNORMAL /LPF (ref 0–5)
IMM GRANULOCYTES # BLD AUTO: 0.2 K/UL (ref 0–0.04)
IMM GRANULOCYTES NFR BLD AUTO: 1 % (ref 0–0.5)
KETONES UR QL STRIP.AUTO: NEGATIVE MG/DL
LACTATE BLD-SCNC: 1.07 MMOL/L (ref 0.4–2)
LACTATE BLD-SCNC: 2.18 MMOL/L (ref 0.4–2)
LEUKOCYTE ESTERASE UR QL STRIP.AUTO: NEGATIVE
LYMPHOCYTES # BLD: 0.4 K/UL (ref 0.8–3.5)
LYMPHOCYTES NFR BLD: 2 % (ref 12–49)
MAGNESIUM SERPL-MCNC: 1.7 MG/DL (ref 1.6–2.4)
MCH RBC QN AUTO: 27.1 PG (ref 26–34)
MCHC RBC AUTO-ENTMCNC: 32.3 G/DL (ref 30–36.5)
MCV RBC AUTO: 83.9 FL (ref 80–99)
MONOCYTES # BLD: 0.4 K/UL (ref 0–1)
MONOCYTES NFR BLD: 2 % (ref 5–13)
NEUTS SEG # BLD: 17.6 K/UL (ref 1.8–8)
NEUTS SEG NFR BLD: 95 % (ref 32–75)
NITRITE UR QL STRIP.AUTO: NEGATIVE
NRBC # BLD: 0 K/UL (ref 0–0.01)
NRBC BLD-RTO: 0 PER 100 WBC
PCO2 BLD: 31.8 MMHG (ref 35–45)
PH BLD: 7.46 [PH] (ref 7.35–7.45)
PH UR STRIP: 5.5 [PH] (ref 5–8)
PLATELET # BLD AUTO: 216 K/UL (ref 150–400)
PMV BLD AUTO: 9.7 FL (ref 8.9–12.9)
PO2 BLD: 40 MMHG (ref 80–100)
POTASSIUM SERPL-SCNC: 2.4 MMOL/L (ref 3.5–5.1)
PROT SERPL-MCNC: 8.5 G/DL (ref 6.4–8.2)
PROT UR STRIP-MCNC: 30 MG/DL
Q-T INTERVAL, ECG07: 342 MS
QRS DURATION, ECG06: 80 MS
QTC CALCULATION (BEZET), ECG08: 527 MS
RBC # BLD AUTO: 4.72 M/UL (ref 3.8–5.2)
RBC #/AREA URNS HPF: ABNORMAL /HPF (ref 0–5)
RBC MORPH BLD: ABNORMAL
SAMPLES BEING HELD,HOLD: NORMAL
SAO2 % BLD: 78.8 % (ref 92–97)
SERVICE CMNT-IMP: ABNORMAL
SERVICE CMNT-IMP: ABNORMAL
SODIUM SERPL-SCNC: 134 MMOL/L (ref 136–145)
SOURCE, COVRS: NORMAL
SP GR UR REFRACTOMETRY: 1.02 (ref 1–1.03)
SPECIMEN TYPE: ABNORMAL
TROPONIN I SERPL-MCNC: <0.05 NG/ML
TSH SERPL DL<=0.05 MIU/L-ACNC: 3.53 UIU/ML (ref 0.36–3.74)
UA: UC IF INDICATED,UAUC: ABNORMAL
UROBILINOGEN UR QL STRIP.AUTO: 0.2 EU/DL (ref 0.2–1)
VENTRICULAR RATE, ECG03: 143 BPM
WBC # BLD AUTO: 18.6 K/UL (ref 3.6–11)
WBC URNS QL MICRO: ABNORMAL /HPF (ref 0–4)

## 2021-06-16 PROCEDURE — 84443 ASSAY THYROID STIM HORMONE: CPT

## 2021-06-16 PROCEDURE — 81001 URINALYSIS AUTO W/SCOPE: CPT

## 2021-06-16 PROCEDURE — 87635 SARS-COV-2 COVID-19 AMP PRB: CPT

## 2021-06-16 PROCEDURE — 96376 TX/PRO/DX INJ SAME DRUG ADON: CPT

## 2021-06-16 PROCEDURE — 74011250636 HC RX REV CODE- 250/636: Performed by: STUDENT IN AN ORGANIZED HEALTH CARE EDUCATION/TRAINING PROGRAM

## 2021-06-16 PROCEDURE — 80053 COMPREHEN METABOLIC PANEL: CPT

## 2021-06-16 PROCEDURE — 74011000258 HC RX REV CODE- 258: Performed by: GENERAL ACUTE CARE HOSPITAL

## 2021-06-16 PROCEDURE — 85379 FIBRIN DEGRADATION QUANT: CPT

## 2021-06-16 PROCEDURE — 36415 COLL VENOUS BLD VENIPUNCTURE: CPT

## 2021-06-16 PROCEDURE — 71045 X-RAY EXAM CHEST 1 VIEW: CPT

## 2021-06-16 PROCEDURE — 96365 THER/PROPH/DIAG IV INF INIT: CPT

## 2021-06-16 PROCEDURE — C9113 INJ PANTOPRAZOLE SODIUM, VIA: HCPCS | Performed by: GENERAL ACUTE CARE HOSPITAL

## 2021-06-16 PROCEDURE — 71250 CT THORAX DX C-: CPT

## 2021-06-16 PROCEDURE — 96366 THER/PROPH/DIAG IV INF ADDON: CPT

## 2021-06-16 PROCEDURE — 84484 ASSAY OF TROPONIN QUANT: CPT

## 2021-06-16 PROCEDURE — 74011000258 HC RX REV CODE- 258: Performed by: STUDENT IN AN ORGANIZED HEALTH CARE EDUCATION/TRAINING PROGRAM

## 2021-06-16 PROCEDURE — 74176 CT ABD & PELVIS W/O CONTRAST: CPT

## 2021-06-16 PROCEDURE — 99285 EMERGENCY DEPT VISIT HI MDM: CPT

## 2021-06-16 PROCEDURE — 78580 LUNG PERFUSION IMAGING: CPT

## 2021-06-16 PROCEDURE — 96367 TX/PROPH/DG ADDL SEQ IV INF: CPT

## 2021-06-16 PROCEDURE — 74011250636 HC RX REV CODE- 250/636: Performed by: GENERAL ACUTE CARE HOSPITAL

## 2021-06-16 PROCEDURE — C9113 INJ PANTOPRAZOLE SODIUM, VIA: HCPCS | Performed by: STUDENT IN AN ORGANIZED HEALTH CARE EDUCATION/TRAINING PROGRAM

## 2021-06-16 PROCEDURE — 74011250637 HC RX REV CODE- 250/637: Performed by: STUDENT IN AN ORGANIZED HEALTH CARE EDUCATION/TRAINING PROGRAM

## 2021-06-16 PROCEDURE — 85025 COMPLETE CBC W/AUTO DIFF WBC: CPT

## 2021-06-16 PROCEDURE — 65660000001 HC RM ICU INTERMED STEPDOWN

## 2021-06-16 PROCEDURE — 83735 ASSAY OF MAGNESIUM: CPT

## 2021-06-16 PROCEDURE — 83605 ASSAY OF LACTIC ACID: CPT

## 2021-06-16 PROCEDURE — 74011000250 HC RX REV CODE- 250: Performed by: STUDENT IN AN ORGANIZED HEALTH CARE EDUCATION/TRAINING PROGRAM

## 2021-06-16 PROCEDURE — 87040 BLOOD CULTURE FOR BACTERIA: CPT

## 2021-06-16 PROCEDURE — 96375 TX/PRO/DX INJ NEW DRUG ADDON: CPT

## 2021-06-16 PROCEDURE — 76705 ECHO EXAM OF ABDOMEN: CPT

## 2021-06-16 PROCEDURE — 93005 ELECTROCARDIOGRAM TRACING: CPT

## 2021-06-16 PROCEDURE — 82803 BLOOD GASES ANY COMBINATION: CPT

## 2021-06-16 PROCEDURE — 74011000250 HC RX REV CODE- 250: Performed by: GENERAL ACUTE CARE HOSPITAL

## 2021-06-16 RX ORDER — METRONIDAZOLE 500 MG/100ML
500 INJECTION, SOLUTION INTRAVENOUS EVERY 8 HOURS
Status: DISCONTINUED | OUTPATIENT
Start: 2021-06-16 | End: 2021-06-22 | Stop reason: HOSPADM

## 2021-06-16 RX ORDER — SODIUM CHLORIDE 9 MG/ML
125 INJECTION, SOLUTION INTRAVENOUS CONTINUOUS
Status: DISCONTINUED | OUTPATIENT
Start: 2021-06-16 | End: 2021-06-17

## 2021-06-16 RX ORDER — MAGNESIUM SULFATE HEPTAHYDRATE 40 MG/ML
2 INJECTION, SOLUTION INTRAVENOUS
Status: COMPLETED | OUTPATIENT
Start: 2021-06-16 | End: 2021-06-16

## 2021-06-16 RX ORDER — DIGOXIN 0.25 MG/ML
250 INJECTION INTRAMUSCULAR; INTRAVENOUS
Status: COMPLETED | OUTPATIENT
Start: 2021-06-16 | End: 2021-06-16

## 2021-06-16 RX ORDER — POTASSIUM CHLORIDE 7.45 MG/ML
10 INJECTION INTRAVENOUS
Status: DISCONTINUED | OUTPATIENT
Start: 2021-06-16 | End: 2021-06-16

## 2021-06-16 RX ORDER — DILTIAZEM HYDROCHLORIDE 5 MG/ML
10 INJECTION INTRAVENOUS
Status: COMPLETED | OUTPATIENT
Start: 2021-06-16 | End: 2021-06-16

## 2021-06-16 RX ORDER — MORPHINE SULFATE 2 MG/ML
4 INJECTION, SOLUTION INTRAMUSCULAR; INTRAVENOUS
Status: COMPLETED | OUTPATIENT
Start: 2021-06-16 | End: 2021-06-16

## 2021-06-16 RX ORDER — POTASSIUM CHLORIDE 7.45 MG/ML
10 INJECTION INTRAVENOUS
Status: ACTIVE | OUTPATIENT
Start: 2021-06-16 | End: 2021-06-16

## 2021-06-16 RX ORDER — ONDANSETRON 2 MG/ML
4 INJECTION INTRAMUSCULAR; INTRAVENOUS
Status: DISCONTINUED | OUTPATIENT
Start: 2021-06-16 | End: 2021-06-18 | Stop reason: SDUPTHER

## 2021-06-16 RX ORDER — MORPHINE SULFATE 2 MG/ML
1 INJECTION, SOLUTION INTRAMUSCULAR; INTRAVENOUS
Status: DISCONTINUED | OUTPATIENT
Start: 2021-06-16 | End: 2021-06-22 | Stop reason: HOSPADM

## 2021-06-16 RX ORDER — DILTIAZEM HYDROCHLORIDE 5 MG/ML
5 INJECTION INTRAVENOUS ONCE
Status: COMPLETED | OUTPATIENT
Start: 2021-06-17 | End: 2021-06-17

## 2021-06-16 RX ORDER — POTASSIUM CHLORIDE 750 MG/1
40 TABLET, FILM COATED, EXTENDED RELEASE ORAL
Status: COMPLETED | OUTPATIENT
Start: 2021-06-16 | End: 2021-06-16

## 2021-06-16 RX ORDER — ONDANSETRON 2 MG/ML
4 INJECTION INTRAMUSCULAR; INTRAVENOUS
Status: COMPLETED | OUTPATIENT
Start: 2021-06-16 | End: 2021-06-16

## 2021-06-16 RX ORDER — ACETAMINOPHEN 325 MG/1
650 TABLET ORAL
Status: DISCONTINUED | OUTPATIENT
Start: 2021-06-16 | End: 2021-06-18 | Stop reason: SDUPTHER

## 2021-06-16 RX ADMIN — SODIUM CHLORIDE 40 MG: 9 INJECTION INTRAMUSCULAR; INTRAVENOUS; SUBCUTANEOUS at 21:05

## 2021-06-16 RX ADMIN — SODIUM CHLORIDE 1000 ML: 9 INJECTION, SOLUTION INTRAVENOUS at 14:04

## 2021-06-16 RX ADMIN — MORPHINE SULFATE 1 MG: 2 INJECTION, SOLUTION INTRAMUSCULAR; INTRAVENOUS at 21:05

## 2021-06-16 RX ADMIN — DIGOXIN 250 MCG: 0.25 INJECTION INTRAMUSCULAR; INTRAVENOUS at 21:47

## 2021-06-16 RX ADMIN — MORPHINE SULFATE 4 MG: 2 INJECTION, SOLUTION INTRAMUSCULAR; INTRAVENOUS at 12:18

## 2021-06-16 RX ADMIN — POTASSIUM CHLORIDE 10 MEQ: 7.46 INJECTION, SOLUTION INTRAVENOUS at 15:13

## 2021-06-16 RX ADMIN — DEXTROSE MONOHYDRATE 12.5 MG/HR: 50 INJECTION, SOLUTION INTRAVENOUS at 19:28

## 2021-06-16 RX ADMIN — SODIUM CHLORIDE 1000 ML: 9 INJECTION, SOLUTION INTRAVENOUS at 15:13

## 2021-06-16 RX ADMIN — CEFEPIME HYDROCHLORIDE 2 G: 2 INJECTION, POWDER, FOR SOLUTION INTRAVENOUS at 21:52

## 2021-06-16 RX ADMIN — DILTIAZEM HYDROCHLORIDE 10 MG: 5 INJECTION INTRAVENOUS at 12:14

## 2021-06-16 RX ADMIN — POTASSIUM CHLORIDE 40 MEQ: 750 TABLET, FILM COATED, EXTENDED RELEASE ORAL at 17:51

## 2021-06-16 RX ADMIN — SODIUM CHLORIDE 1000 ML: 9 INJECTION, SOLUTION INTRAVENOUS at 13:15

## 2021-06-16 RX ADMIN — DEXTROSE MONOHYDRATE 15 MG/HR: 50 INJECTION, SOLUTION INTRAVENOUS at 20:24

## 2021-06-16 RX ADMIN — CEFEPIME HYDROCHLORIDE 2 G: 2 INJECTION, POWDER, FOR SOLUTION INTRAVENOUS at 13:25

## 2021-06-16 RX ADMIN — SODIUM CHLORIDE 40 MG: 9 INJECTION, SOLUTION INTRAMUSCULAR; INTRAVENOUS; SUBCUTANEOUS at 16:42

## 2021-06-16 RX ADMIN — MAGNESIUM SULFATE HEPTAHYDRATE 2 G: 40 INJECTION, SOLUTION INTRAVENOUS at 13:25

## 2021-06-16 RX ADMIN — METRONIDAZOLE 500 MG: 500 INJECTION, SOLUTION INTRAVENOUS at 22:49

## 2021-06-16 RX ADMIN — POTASSIUM CHLORIDE 10 MEQ: 7.46 INJECTION, SOLUTION INTRAVENOUS at 13:24

## 2021-06-16 RX ADMIN — POTASSIUM CHLORIDE 10 MEQ: 7.46 INJECTION, SOLUTION INTRAVENOUS at 12:02

## 2021-06-16 RX ADMIN — SODIUM CHLORIDE 125 ML/HR: 9 INJECTION, SOLUTION INTRAVENOUS at 19:40

## 2021-06-16 RX ADMIN — SODIUM CHLORIDE 1000 ML: 9 INJECTION, SOLUTION INTRAVENOUS at 11:51

## 2021-06-16 RX ADMIN — ONDANSETRON 4 MG: 2 INJECTION INTRAMUSCULAR; INTRAVENOUS at 11:51

## 2021-06-16 RX ADMIN — DEXTROSE MONOHYDRATE 2.5 MG/HR: 50 INJECTION, SOLUTION INTRAVENOUS at 15:29

## 2021-06-16 NOTE — ED NOTES
Assumed care of pt from triage. Pt is A&O x 4. Pt reports CC of shortness of breath for three days. Pt reports she has had a cough for three days as well bringing up clear/brown thin sputum. Pt also reports weakness and on/off fevers. Pt became hypoxic once in room so placed pt on 3L NC. Pt also reports dizziness. 1115- Pt called out stating she is hot, sweaty and feels uncomfortable. MD Vaughn Payment notified. 1151- Started fluids on pt.     1202- Medicated pt per orders. 1330- Pt is hypotensive. MD Vaughn Payment notified. 1420- Ultrasound at bedside. 1529- Started Diliazem drip    1600- Straight cathed pt for urine     1710- This RN transported pt to Nuclear Medicine. 1745- Pt back to room and placed on monitor x3. With call bell in reach. Provided pt with a jug of water. 1920- Bedside and Verbal shift change report given to Trish Medrano (oncoming nurse) by Brice Noonan (offgoing nurse). Report included the following information SBAR, ED Summary, MAR and Recent Results.

## 2021-06-16 NOTE — Clinical Note
TRANSFER - OUT REPORT:     Verbal report given to: [facility], (at bedside). Report consisted of patient's Situation, Background, Assessment and   Recommendations(SBAR). Opportunity for questions and clarification was provided. Patient transported with a Registered Nurse. Patient transported to: recovery.

## 2021-06-16 NOTE — ED NOTES
This nurse assumed care of pt at this time. 1940: pt provided with food tray at this time. Pt tolerated intake well. 2015: hospitalist at bedside at this time.

## 2021-06-16 NOTE — ED PROVIDER NOTES
EMERGENCY DEPARTMENT HISTORY AND PHYSICAL EXAM      Date: 2021  Patient Name: Catalina Ross    History of Presenting Illness     Chief Complaint   Patient presents with    Shortness of Breath     x 3 days SPO2 is 97% in triage.  Fever     Pt repor ts intermittent fever x 3 days. Pt is diaphoretic in triage and report EMS came out to her house yesterday, however they would not come in.  Concern For COVID-19 (Coronavirus)     Denies known contacts. History Provided By: Patient    HPI: Catalina Ross, 62 y.o. female with past medical history of GERD, hypertension, presents to the ED with cc of dizziness, abdominal pain, nausea, vomiting, diarrhea, loss of appetite, cough, shortness of breath for the past 3 days. Patient endorses subjective fevers and chills. States that she has only received one of her 2 Covid vaccinations. Denies any known sick contacts. States that she is most concerned about her symptoms of abdominal pain at this time. States that the pain is localized to her right upper and lower quadrants. Patient has had a previous , denies any other abdominal surgical history. She denies any urinary complaints. PCP: Irene Hernandez NP    No current facility-administered medications on file prior to encounter. Current Outpatient Medications on File Prior to Encounter   Medication Sig Dispense Refill    gabapentin (NEURONTIN) 100 mg capsule Take 1 Cap by mouth three (3) times daily. Max Daily Amount: 300 mg. Indications: nerve pain after herpes 90 Cap 1    hydroCHLOROthiazide (MICROZIDE) 12.5 mg capsule Take 12.5 mg by mouth daily.  tiZANidine (ZANAFLEX) 2 mg tablet TAKE 1 TABLET BY MOUTH EVERY 8 HOURS AS NEEDED 20 Tab 1    sertraline (ZOLOFT) 100 mg tablet Take 1 Tab by mouth daily. Indications: ANXIETY WITH DEPRESSION 30 Tab 1    rosuvastatin (CRESTOR) 20 mg tablet Take 1 Tab by mouth daily.  90 Tab 1    omeprazole (PRILOSEC) 40 mg capsule Take 1 Cap by mouth daily. 90 Cap 3    diclofenac (VOLTAREN) 1 % gel Apply 2 g to affected area four (4) times daily. 2 g 3       Past History     Past Medical History:  Past Medical History:   Diagnosis Date    Cancer ([address] 1986    cervical    GERD (gastroesophageal reflux disease)     Hypertension     Ill-defined condition     Fibromyalgia       Past Surgical History:  Past Surgical History:   Procedure Laterality Date    COLONOSCOPY N/A 2019    COLONOSCOPY performed by Wilda [facility]ige [facility] at [facility] ENDOSCOPY    HX  SECTION      HX GYN      cone resection       Family History:  Family History   Problem Relation Age of Onset    Hypertension Mother     Heart Disease Father     Other Sister         FMS    Pancreatic Cancer Maternal Grandfather 61       Social History:  Social History     Tobacco Use    Smoking status: Never Smoker    Smokeless tobacco: Never Used   Substance Use Topics    Alcohol use: Yes     Alcohol/week: 2.5 standard drinks     Types: 3 Cans of beer per week     Comment: socially    Drug use: No       Allergies: Allergies   Allergen Reactions    Pcn [Penicillins] Other (comments)     \"pass out\"    Bactrim [Sulfamethoprim] Other (comments)     \"migraines\"    Metoprolol Tartrate Shortness of Breath         Review of Systems   Review of Systems   Constitutional: Positive for appetite change, chills and fever. HENT: Negative for congestion and rhinorrhea. Eyes: Negative for visual disturbance. Respiratory: Positive for cough and shortness of breath. Negative for chest tightness and wheezing. Cardiovascular: Negative for chest pain, palpitations and leg swelling. Gastrointestinal: Positive for abdominal pain, diarrhea, nausea and vomiting. Genitourinary: Negative for dysuria, flank pain and hematuria. Musculoskeletal: Negative for back pain and neck pain. Skin: Negative for rash. Allergic/Immunologic: Negative for immunocompromised state.    Neurological: Positive for dizziness and light-headedness. Negative for speech difficulty, weakness and headaches. Hematological: Negative for adenopathy. Psychiatric/Behavioral: Negative for dysphoric mood and suicidal ideas. Physical Exam   Physical Exam  Vitals and nursing note reviewed. Constitutional:       General: She is in acute distress. Appearance: She is ill-appearing and diaphoretic. She is not toxic-appearing. HENT:      Head: Normocephalic and atraumatic. Nose: Nose normal.      Mouth/Throat:      Mouth: Mucous membranes are moist.   Eyes:      Extraocular Movements: Extraocular movements intact. Pupils: Pupils are equal, round, and reactive to light. Cardiovascular:      Rate and Rhythm: Tachycardia present. Rhythm irregular. Pulses: Normal pulses. Pulmonary:      Effort: Pulmonary effort is normal.      Breath sounds: No stridor. No wheezing or rhonchi. Abdominal:      General: Abdomen is flat. There is no distension. Tenderness: There is abdominal tenderness in the right upper quadrant and right lower quadrant. There is guarding. There is no right CVA tenderness, left CVA tenderness or rebound. Musculoskeletal:         General: Normal range of motion. Cervical back: Normal range of motion and neck supple. Right lower leg: No tenderness. No edema. Left lower leg: No tenderness. No edema. Skin:     General: Skin is warm. Neurological:      General: No focal deficit present. Mental Status: She is alert and oriented to person, place, and time.    Psychiatric:         Judgment: Judgment normal.         Diagnostic Study Results     Labs -     Recent Results (from the past 24 hour(s))   EKG, 12 LEAD, INITIAL    Collection Time: 06/16/21 10:51 AM   Result Value Ref Range    Ventricular Rate 143 BPM    Atrial Rate 300 BPM    QRS Duration 80 ms    Q-T Interval 342 ms    QTC Calculation (Bezet) 527 ms    Calculated R Axis 44 degrees    Calculated T Axis 85 degrees Diagnosis       Atrial flutter with variable AV block  Nonspecific ST and T wave abnormality  When compared with ECG of 04-NOV-2016 07:23,  Atrial flutter has replaced Sinus rhythm  Vent. rate has increased BY  63 BPM    Confirmed by Gaudencio Cisneros ([id number]) on 6/16/2021 6:51:08 PM     CBC WITH AUTOMATED DIFF    Collection Time: 06/16/21 11:03 AM   Result Value Ref Range    WBC 18.6 (H) 3.6 - 11.0 K/uL    RBC 4.72 3.80 - 5.20 M/uL    HGB 12.8 11.5 - 16.0 g/dL    HCT 39.6 35.0 - 47.0 %    MCV 83.9 80.0 - 99.0 FL    MCH 27.1 26.0 - 34.0 PG    MCHC 32.3 30.0 - 36.5 g/dL    RDW 22.0 (H) 11.5 - 14.5 %    PLATELET 946 619 - 125 K/uL    MPV 9.7 8.9 - 12.9 FL    NRBC 0.0 0  WBC    ABSOLUTE NRBC 0.00 0.00 - 0.01 K/uL    NEUTROPHILS 95 (H) 32 - 75 %    LYMPHOCYTES 2 (L) 12 - 49 %    MONOCYTES 2 (L) 5 - 13 %    EOSINOPHILS 0 0 - 7 %    BASOPHILS 0 0 - 1 %    IMMATURE GRANULOCYTES 1 (H) 0.0 - 0.5 %    ABS. NEUTROPHILS 17.6 (H) 1.8 - 8.0 K/UL    ABS. LYMPHOCYTES 0.4 (L) 0.8 - 3.5 K/UL    ABS. MONOCYTES 0.4 0.0 - 1.0 K/UL    ABS. EOSINOPHILS 0.0 0.0 - 0.4 K/UL    ABS. BASOPHILS 0.0 0.0 - 0.1 K/UL    ABS. IMM. GRANS. 0.2 (H) 0.00 - 0.04 K/UL    DF SMEAR SCANNED      RBC COMMENTS ANISOCYTOSIS  2+       METABOLIC PANEL, COMPREHENSIVE    Collection Time: 06/16/21 11:03 AM   Result Value Ref Range    Sodium 134 (L) 136 - 145 mmol/L    Potassium 2.4 (LL) 3.5 - 5.1 mmol/L    Chloride 99 97 - 108 mmol/L    CO2 24 21 - 32 mmol/L    Anion gap 11 5 - 15 mmol/L    Glucose 143 (H) 65 - 100 mg/dL    BUN 33 (H) 6 - 20 MG/DL    Creatinine 2.05 (H) 0.55 - 1.02 MG/DL    BUN/Creatinine ratio 16 12 - 20      GFR est AA 30 (L) >60 ml/min/1.73m2    GFR est non-AA 25 (L) >60 ml/min/1.73m2    Calcium 8.7 8.5 - 10.1 MG/DL    Bilirubin, total 1.4 (H) 0.2 - 1.0 MG/DL    ALT (SGPT) 80 (H) 12 - 78 U/L    AST (SGOT) 64 (H) 15 - 37 U/L    Alk.  phosphatase 108 45 - 117 U/L    Protein, total 8.5 (H) 6.4 - 8.2 g/dL    Albumin 3.2 (L) 3.5 - 5.0 g/dL Globulin 5.3 (H) 2.0 - 4.0 g/dL    A-G Ratio 0.6 (L) 1.1 - 2.2     TROPONIN I    Collection Time: 06/16/21 11:03 AM   Result Value Ref Range    Troponin-I, Qt. <0.05 <0.05 ng/mL   SAMPLES BEING HELD    Collection Time: 06/16/21 11:03 AM   Result Value Ref Range    SAMPLES BEING HELD  SST, RED, BLUE     COMMENT        Add-on orders for these samples will be processed based on acceptable specimen integrity and analyte stability, which may vary by analyte.    MAGNESIUM    Collection Time: 06/16/21 11:03 AM   Result Value Ref Range    Magnesium 1.7 1.6 - 2.4 mg/dL   TSH 3RD GENERATION    Collection Time: 06/16/21 11:03 AM   Result Value Ref Range    TSH 3.53 0.36 - 3.74 uIU/mL   COVID-19 RAPID TEST    Collection Time: 06/16/21 11:54 AM   Result Value Ref Range    Specimen source Nasopharyngeal      COVID-19 rapid test Not detected NOTD     BLOOD GAS,LACTIC ACID, POC    Collection Time: 06/16/21 12:02 PM   Result Value Ref Range    pH (POC) 7.46 (H) 7.35 - 7.45      pCO2 (POC) 31.8 (L) 35.0 - 45.0 MMHG    pO2 (POC) 40 (LL) 80 - 100 MMHG    HCO3 (POC) 22.8 22 - 26 MMOL/L    sO2 (POC) 78.8 (L) 92 - 97 %    Base deficit (POC) 0.2 mmol/L    Specimen type (POC) VENOUS BLOOD      Performed by Blayne Aranda RN     Lactic Acid (POC) 2.18 (HH) 0.40 - 2.00 mmol/L    Critical value read back Scott Del DIMER    Collection Time: 06/16/21  3:23 PM   Result Value Ref Range    D-dimer 3.30 (H) 0.00 - 0.65 mg/L FEU   POC LACTIC ACID    Collection Time: 06/16/21  3:25 PM   Result Value Ref Range    Lactic Acid (POC) 1.07 0.40 - 2.00 mmol/L   URINALYSIS W/ REFLEX CULTURE    Collection Time: 06/16/21  4:12 PM    Specimen: Urine   Result Value Ref Range    Color YELLOW/STRAW      Appearance TURBID (A) CLEAR      Specific gravity 1.021 1.003 - 1.030      pH (UA) 5.5 5.0 - 8.0      Protein 30 (A) NEG mg/dL    Glucose Negative NEG mg/dL    Ketone Negative NEG mg/dL    Bilirubin Negative NEG      Blood MODERATE (A) NEG      Urobilinogen 0.2 0.2 - 1.0 EU/dL    Nitrites Negative NEG      Leukocyte Esterase Negative NEG      WBC 0-4 0 - 4 /hpf    RBC 0-5 0 - 5 /hpf    Epithelial cells FEW FEW /lpf    Bacteria Negative NEG /hpf    UA:UC IF INDICATED CULTURE NOT INDICATED BY UA RESULT CNI      Hyaline cast 2-5 0 - 5 /lpf       Radiologic Studies -   NM LUNG SCAN PERF   Final Result   Low probability for pulmonary embolism. US ABD LTD   Final Result      Technically limited exam. Cholelithiasis. Heterogeneously increased liver   echotexture is nonspecific         CT ABD PELV WO CONT   Final Result   RUQ inflammation probably duodenal in origin. Cholelithiasis. Hepatic steatosis. Mild bibasilar atelectasis. CT CHEST WO CONT   Final Result   RUQ inflammation probably duodenal in origin. Cholelithiasis. Hepatic steatosis. Mild bibasilar atelectasis. XR CHEST PORT   Final Result   No acute process. CT Results  (Last 48 hours)               06/16/21 1310  CT ABD PELV WO CONT Final result    Impression:  RUQ inflammation probably duodenal in origin. Cholelithiasis. Hepatic steatosis. Mild bibasilar atelectasis. Narrative:  INDICATION:  tachycardia, hypotensive, hypoxia        EXAM: CT Chest, Abdomen and Pelvis. CT dose reduction was achieved through the   use of a standardized protocol tailored for this examination and automatic   exposure control for dose modulation. CONTRAST: No IV contrast. No oral contrast.       CHEST:    The lungs show minimal bibasilar atelectasis. There is no significant   mediastinal or hilar adenopathy. There is no pleural or pericardial effusion. Visualized thyroid and lower neck soft tissues are unremarkable for age. ABDOMEN PELVIS:   There is inflammatory stranding in the right upper quadrant extending   anterolaterally from the second portion of the duodenum to the ascending colon. This may be due to duodenal ulcer/duodenitis.        There is no pneumoperitoneum. There is no apparent fluid collection on   unenhanced imaging. There is mild reactive type adenopathy. Pancreas is normal.       Gallbladder contains a stone, without apparent inflammation. Liver shows steatosis. Bile ducts and spleen are not enlarged. Adrenal glands   are normal in size. Kidneys show no mass or hydronephrosis. Aorta shows no   aneurysm. The appendix is normal.       The bladder is empty. There is no pelvic mass. 06/16/21 1310  CT CHEST WO CONT Final result    Impression:  RUQ inflammation probably duodenal in origin. Cholelithiasis. Hepatic steatosis. Mild bibasilar atelectasis. Narrative:  INDICATION:  tachycardia, hypotensive, hypoxia        EXAM: CT Chest, Abdomen and Pelvis. CT dose reduction was achieved through the   use of a standardized protocol tailored for this examination and automatic   exposure control for dose modulation. CONTRAST: No IV contrast. No oral contrast.       CHEST:    The lungs show minimal bibasilar atelectasis. There is no significant   mediastinal or hilar adenopathy. There is no pleural or pericardial effusion. Visualized thyroid and lower neck soft tissues are unremarkable for age. ABDOMEN PELVIS:   There is inflammatory stranding in the right upper quadrant extending   anterolaterally from the second portion of the duodenum to the ascending colon. This may be due to duodenal ulcer/duodenitis. There is no pneumoperitoneum. There is no apparent fluid collection on   unenhanced imaging. There is mild reactive type adenopathy. Pancreas is normal.       Gallbladder contains a stone, without apparent inflammation. Liver shows steatosis. Bile ducts and spleen are not enlarged. Adrenal glands   are normal in size. Kidneys show no mass or hydronephrosis. Aorta shows no   aneurysm. The appendix is normal.       The bladder is empty. There is no pelvic mass.                CXR Results (Last 48 hours)               06/16/21 1127  XR CHEST PORT Final result    Impression:  No acute process. Narrative: Indication: Shortness of breath       Comparison: 11/4/2016       Portable exam of the chest obtained at 1116 demonstrates normal heart size. There is no acute process in the lung fields. The osseous structures are   unremarkable. Medical Decision Making   I am the first provider for this patient. I reviewed the vital signs, available nursing notes, past medical history, past surgical history, family history and social history. Vital Signs-Reviewed the patient's vital signs. Patient Vitals for the past 24 hrs:   Temp Pulse Resp BP SpO2   06/16/21 1637  (!) 126 25 (!) 130/94 96 %   06/16/21 1622  (!) 124 20 (!) 123/94    06/16/21 1607  (!) 127 25 115/84 95 %   06/16/21 1552  (!) 126 25 93/61 93 %   06/16/21 1545  (!) 124 26 104/74 96 %   06/16/21 1507  (!) 128 25 (!) 97/59 97 %   06/16/21 1452  (!) 126 26 (!) 85/64 97 %   06/16/21 1422  (!) 131 30 97/61 94 %   06/16/21 1414  (!) 132 26 (!) 101/52 95 %   06/16/21 1352    (!) 88/58    06/16/21 1329  (!) 139 20 (!) 84/51 94 %   06/16/21 1325  (!) 132  99/73    06/16/21 1229  (!) 130 22 (!) 95/55 93 %   06/16/21 1214  (!) 137 18 103/80    06/16/21 1205     93 %   06/16/21 1200  (!) 144 16 112/82 94 %   06/16/21 1145  (!) 155 17 (!) 81/53 92 %   06/16/21 1130  (!) 150 18 (!) 77/49 94 %   06/16/21 1044 97.9 °F (36.6 °C) (!) 117 20 112/76 97 %       EKG interpretation: (Preliminary)  A flutter with RVR, 143 bpm, nonspecific ST changes, likely rate dependent. No STEMI. QTc is prolonged. EKG interpretation by Fareed Gonzalez MD    Records Reviewed: Nursing Notes and Old Medical Records    Provider Notes (Medical Decision Making):   Patient is hypotensive, tachycardic, afebrile. She is slightly hypoxic with SPO2 in the high 80s to low 90s. Subsequently placed on 2 L of O2.   She appears ill, slightly diaphoretic, in some distress secondary to pain. Differential diagnosis considered: Covid, pneumonia, sepsis, septic shock, dehydration, PE, acute abdomen, gastroenteritis, A. fib with RVR, UTI/pyelo, etc    EKG revealed A. fib/flutter with RVR, heart rate initially in the 150s. Patient has no prior history of A. fib/a flutter. Her A. fib is treated with 10 mg IV bolus of diltiazem, followed by IV diltiazem drip. Work-up remarkable for leukocytosis of 18.6, lactic acidosis of 2.18. Patient triggered sepsis based on tachycardic heart rate, hypotension, leukocytosis and lactic acidosis. Code sepsis was called. Blood cultures obtained. Patient empirically started on cefepime as source of infection is currently unclear. She is allergic to penicillins. Patient given greater than 30 cc of IV fluids as she appears dry. Her labs also indicate dehydration with MINDY-creatinine 2.05, BUN of 33. Patient's blood pressure is improved after IV hydration. Patient also incidentally found to have hypokalemia of 2.4. She was given 40 mEq p.o. KCl as well as 40 mEq IV KCl. Her magnesium is slightly low at 1.7. Supplemented with 2 g of IV magnesium. LFTs slightly elevated with total bilirubin of 1.4, ALT of 80, AST of 64. CT chest/abdomen pelvis without contrast (due to patient's renal function) revealed right upper quadrant inflammation probably duodenal in origin, duodenal ulcer versus duodenitis. No free air or concern for perforation. Cholelithiasis. Hepatic steatosis. Mild bibasilar atelectasis. Patient is administered IV Protonix for CT findings. Upper quadrant ultrasound also without evidence of cholecystitis. D-dimer is elevated at 3.3. However, V/Q scan- low probability of PE.  UA is negative for infection. COVID-19 is negative. Repeat lactic acid after IV fluids revealed normalization with repeat lactic acid of 1.07.       Patient will require admission to stepdown unit for treatment of the above issues. CRITICAL CARE NOTE:    Authorized and Performed by: Gael Rodney MD    IMPENDING DETERIORATION -Respiratory, Cardiovascular, Metabolic and Renal  ASSOCIATED RISK FACTORS - Hypotension, Shock, Hypoxia, Dysrhythmia, Metabolic changes, Dehydration and Vascular Compromise  MANAGEMENT- Bedside Assessment  INTERPRETATION -  Xrays, CT Scan, Blood Gases, ECG, Blood Pressure, Cardiac Output Measures  and Pulse Ox  INTERVENTIONS - hemodynamic mngmt, vascular control, Neurologic interventions , Metobolic interventions and tachyarrhythmia rate control  CASE REVIEW - Hospitalist/Intensivist, Nursing  TREATMENT RESPONSE -Improved and Stable  PERFORMED BY - Self    I have spent 120 minutes of critical care time involved in obtaining a history, examining the patient, lab review, arranging urgent treatment with development of a management plan, consultations with other providers, family decision- making, bedside attention and documentation. During this entire length of time I was immediately available to the patient . Critical Care: The reason for providing this level of medical care for this critically ill patient was due to a critical illness that impaired one or more vital organ systems, such that there was a high probability of imminent or life threatening deterioration in the patient's condition. This care involved high complexity decision making to assess, manipulate, and support vital system functions, to treat this degree of vital organ system failure, and to prevent further life threatening deterioration of the patients condition. Critical care time was exclusive of separately billable procedures.         ED Course as of Jun 16 1917 Wed Jun 16, 2021   1245 [facility] ED SEPSIS NOTE:     12:45 PM The patient now meets criteria for: Severe Sepsis    Fluid resuscitation with: 30 mL/kg crystalloid bolus  Due to concern for rapidly advancing infection and deterioration of patient's condition, antibiotics are started STAT and cultures ordered. [JM]      ED Course User Index  [JM] MD January Mchugh MD      Disposition:  Admit    Diagnosis     Clinical Impression:   1. Atrial fibrillation with RVR ([facility] [address]    2. MINDY (acute kidney injury) ([facility] [address]    3. Gastritis and duodenitis    4. Hypokalemia    5. Acute respiratory failure with hypoxia (HCC)    6. Calculus of gallbladder without cholecystitis without obstruction    7. Hepatic steatosis    8. Transaminitis        Attestations:    January Fan MD    Please note that this dictation was completed with [email or web address], the computer voice recognition software. Quite often unanticipated grammatical, syntax, homophones, and other interpretive errors are inadvertently transcribed by the computer software. Please disregard these errors. Please excuse any errors that have escaped final proofreading. Thank you.

## 2021-06-17 PROBLEM — R11.2 NAUSEA & VOMITING: Status: ACTIVE | Noted: 2021-06-17

## 2021-06-17 PROBLEM — K80.20 CHOLELITHIASIS: Status: ACTIVE | Noted: 2021-06-17

## 2021-06-17 PROBLEM — R19.7 DIARRHEA: Status: ACTIVE | Noted: 2021-06-17

## 2021-06-17 PROBLEM — E86.0 DEHYDRATION: Status: ACTIVE | Noted: 2021-06-17

## 2021-06-17 PROBLEM — N17.9 ACUTE KIDNEY INJURY (HCC): Status: ACTIVE | Noted: 2021-06-17

## 2021-06-17 LAB
ALBUMIN SERPL-MCNC: 2.5 G/DL (ref 3.5–5)
ALBUMIN/GLOB SERPL: 0.6 {RATIO} (ref 1.1–2.2)
ALP SERPL-CCNC: 91 U/L (ref 45–117)
ALT SERPL-CCNC: 54 U/L (ref 12–78)
AMYLASE SERPL-CCNC: 15 U/L (ref 25–115)
ANION GAP SERPL CALC-SCNC: 6 MMOL/L (ref 5–15)
AST SERPL-CCNC: 43 U/L (ref 15–37)
BASOPHILS # BLD: 0 K/UL (ref 0–0.1)
BASOPHILS NFR BLD: 0 % (ref 0–1)
BILIRUB SERPL-MCNC: 0.6 MG/DL (ref 0.2–1)
BUN SERPL-MCNC: 26 MG/DL (ref 6–20)
BUN/CREAT SERPL: 19 (ref 12–20)
CALCIUM SERPL-MCNC: 7.9 MG/DL (ref 8.5–10.1)
CHLORIDE SERPL-SCNC: 103 MMOL/L (ref 97–108)
CO2 SERPL-SCNC: 26 MMOL/L (ref 21–32)
CREAT SERPL-MCNC: 1.34 MG/DL (ref 0.55–1.02)
DIFFERENTIAL METHOD BLD: ABNORMAL
EOSINOPHIL # BLD: 0 K/UL (ref 0–0.4)
EOSINOPHIL NFR BLD: 0 % (ref 0–7)
ERYTHROCYTE [DISTWIDTH] IN BLOOD BY AUTOMATED COUNT: 21.6 % (ref 11.5–14.5)
GLOBULIN SER CALC-MCNC: 4.5 G/DL (ref 2–4)
GLUCOSE SERPL-MCNC: 106 MG/DL (ref 65–100)
HCT VFR BLD AUTO: 33.6 % (ref 35–47)
HGB BLD-MCNC: 10.4 G/DL (ref 11.5–16)
IMM GRANULOCYTES # BLD AUTO: 0.2 K/UL (ref 0–0.04)
IMM GRANULOCYTES NFR BLD AUTO: 2 % (ref 0–0.5)
LIPASE SERPL-CCNC: 70 U/L (ref 73–393)
LYMPHOCYTES # BLD: 0.4 K/UL (ref 0.8–3.5)
LYMPHOCYTES NFR BLD: 3 % (ref 12–49)
MAGNESIUM SERPL-MCNC: 2 MG/DL (ref 1.6–2.4)
MCH RBC QN AUTO: 26.9 PG (ref 26–34)
MCHC RBC AUTO-ENTMCNC: 31 G/DL (ref 30–36.5)
MCV RBC AUTO: 86.8 FL (ref 80–99)
MONOCYTES # BLD: 0.4 K/UL (ref 0–1)
MONOCYTES NFR BLD: 3 % (ref 5–13)
NEUTS SEG # BLD: 11.4 K/UL (ref 1.8–8)
NEUTS SEG NFR BLD: 92 % (ref 32–75)
NRBC # BLD: 0 K/UL (ref 0–0.01)
NRBC BLD-RTO: 0 PER 100 WBC
PHOSPHATE SERPL-MCNC: 3 MG/DL (ref 2.6–4.7)
PLATELET # BLD AUTO: 167 K/UL (ref 150–400)
PMV BLD AUTO: 10.4 FL (ref 8.9–12.9)
POTASSIUM SERPL-SCNC: 3.1 MMOL/L (ref 3.5–5.1)
PROT SERPL-MCNC: 7 G/DL (ref 6.4–8.2)
RBC # BLD AUTO: 3.87 M/UL (ref 3.8–5.2)
RBC MORPH BLD: ABNORMAL
SODIUM SERPL-SCNC: 135 MMOL/L (ref 136–145)
WBC # BLD AUTO: 12.4 K/UL (ref 3.6–11)

## 2021-06-17 PROCEDURE — 74011000258 HC RX REV CODE- 258: Performed by: GENERAL ACUTE CARE HOSPITAL

## 2021-06-17 PROCEDURE — 77010033678 HC OXYGEN DAILY

## 2021-06-17 PROCEDURE — 74011250636 HC RX REV CODE- 250/636: Performed by: GENERAL ACUTE CARE HOSPITAL

## 2021-06-17 PROCEDURE — 82150 ASSAY OF AMYLASE: CPT

## 2021-06-17 PROCEDURE — 74011000250 HC RX REV CODE- 250: Performed by: GENERAL ACUTE CARE HOSPITAL

## 2021-06-17 PROCEDURE — 36415 COLL VENOUS BLD VENIPUNCTURE: CPT

## 2021-06-17 PROCEDURE — 74011250636 HC RX REV CODE- 250/636: Performed by: STUDENT IN AN ORGANIZED HEALTH CARE EDUCATION/TRAINING PROGRAM

## 2021-06-17 PROCEDURE — 74011000250 HC RX REV CODE- 250: Performed by: NURSE PRACTITIONER

## 2021-06-17 PROCEDURE — 84100 ASSAY OF PHOSPHORUS: CPT

## 2021-06-17 PROCEDURE — 2709999900 HC NON-CHARGEABLE SUPPLY

## 2021-06-17 PROCEDURE — 80053 COMPREHEN METABOLIC PANEL: CPT

## 2021-06-17 PROCEDURE — C9113 INJ PANTOPRAZOLE SODIUM, VIA: HCPCS | Performed by: GENERAL ACUTE CARE HOSPITAL

## 2021-06-17 PROCEDURE — 74011000250 HC RX REV CODE- 250: Performed by: INTERNAL MEDICINE

## 2021-06-17 PROCEDURE — 83735 ASSAY OF MAGNESIUM: CPT

## 2021-06-17 PROCEDURE — 65660000001 HC RM ICU INTERMED STEPDOWN

## 2021-06-17 PROCEDURE — 99222 1ST HOSP IP/OBS MODERATE 55: CPT | Performed by: INTERNAL MEDICINE

## 2021-06-17 PROCEDURE — 74011000250 HC RX REV CODE- 250: Performed by: STUDENT IN AN ORGANIZED HEALTH CARE EDUCATION/TRAINING PROGRAM

## 2021-06-17 PROCEDURE — 83690 ASSAY OF LIPASE: CPT

## 2021-06-17 PROCEDURE — 99223 1ST HOSP IP/OBS HIGH 75: CPT | Performed by: SURGERY

## 2021-06-17 PROCEDURE — 74011250637 HC RX REV CODE- 250/637: Performed by: NURSE PRACTITIONER

## 2021-06-17 PROCEDURE — 74011250637 HC RX REV CODE- 250/637: Performed by: GENERAL ACUTE CARE HOSPITAL

## 2021-06-17 PROCEDURE — 85025 COMPLETE CBC W/AUTO DIFF WBC: CPT

## 2021-06-17 RX ORDER — DIGOXIN 125 MCG
0.12 TABLET ORAL DAILY
Status: DISCONTINUED | OUTPATIENT
Start: 2021-06-17 | End: 2021-06-22

## 2021-06-17 RX ORDER — POLYETHYLENE GLYCOL 3350 17 G/17G
17 POWDER, FOR SOLUTION ORAL DAILY PRN
Status: DISCONTINUED | OUTPATIENT
Start: 2021-06-17 | End: 2021-06-22 | Stop reason: HOSPADM

## 2021-06-17 RX ORDER — METOPROLOL TARTRATE 5 MG/5ML
1.25 INJECTION INTRAVENOUS ONCE
Status: ACTIVE | OUTPATIENT
Start: 2021-06-17 | End: 2021-06-17

## 2021-06-17 RX ORDER — SERTRALINE HYDROCHLORIDE 50 MG/1
100 TABLET, FILM COATED ORAL DAILY
Status: DISCONTINUED | OUTPATIENT
Start: 2021-06-17 | End: 2021-06-22 | Stop reason: HOSPADM

## 2021-06-17 RX ORDER — GABAPENTIN 100 MG/1
100 CAPSULE ORAL 3 TIMES DAILY
Status: DISCONTINUED | OUTPATIENT
Start: 2021-06-17 | End: 2021-06-22 | Stop reason: HOSPADM

## 2021-06-17 RX ORDER — SODIUM CHLORIDE 0.9 % (FLUSH) 0.9 %
5-40 SYRINGE (ML) INJECTION AS NEEDED
Status: DISCONTINUED | OUTPATIENT
Start: 2021-06-17 | End: 2021-06-21 | Stop reason: SDUPTHER

## 2021-06-17 RX ORDER — METOPROLOL TARTRATE 5 MG/5ML
1.25 INJECTION INTRAVENOUS EVERY 6 HOURS
Status: DISCONTINUED | OUTPATIENT
Start: 2021-06-17 | End: 2021-06-18

## 2021-06-17 RX ORDER — POTASSIUM CHLORIDE 750 MG/1
20 TABLET, FILM COATED, EXTENDED RELEASE ORAL
Status: COMPLETED | OUTPATIENT
Start: 2021-06-17 | End: 2021-06-17

## 2021-06-17 RX ORDER — POTASSIUM CHLORIDE AND SODIUM CHLORIDE 900; 300 MG/100ML; MG/100ML
INJECTION, SOLUTION INTRAVENOUS CONTINUOUS
Status: DISPENSED | OUTPATIENT
Start: 2021-06-17 | End: 2021-06-18

## 2021-06-17 RX ORDER — ACETAMINOPHEN 325 MG/1
650 TABLET ORAL
Status: DISCONTINUED | OUTPATIENT
Start: 2021-06-17 | End: 2021-06-22 | Stop reason: HOSPADM

## 2021-06-17 RX ORDER — ONDANSETRON 4 MG/1
4 TABLET, ORALLY DISINTEGRATING ORAL
Status: DISCONTINUED | OUTPATIENT
Start: 2021-06-17 | End: 2021-06-22 | Stop reason: HOSPADM

## 2021-06-17 RX ORDER — ONDANSETRON 2 MG/ML
4 INJECTION INTRAMUSCULAR; INTRAVENOUS
Status: DISCONTINUED | OUTPATIENT
Start: 2021-06-17 | End: 2021-06-22 | Stop reason: HOSPADM

## 2021-06-17 RX ORDER — SODIUM CHLORIDE 0.9 % (FLUSH) 0.9 %
5-40 SYRINGE (ML) INJECTION EVERY 8 HOURS
Status: DISCONTINUED | OUTPATIENT
Start: 2021-06-17 | End: 2021-06-21 | Stop reason: SDUPTHER

## 2021-06-17 RX ORDER — ACETAMINOPHEN 650 MG/1
650 SUPPOSITORY RECTAL
Status: DISCONTINUED | OUTPATIENT
Start: 2021-06-17 | End: 2021-06-22 | Stop reason: HOSPADM

## 2021-06-17 RX ADMIN — GABAPENTIN 100 MG: 100 CAPSULE ORAL at 16:04

## 2021-06-17 RX ADMIN — DILTIAZEM HYDROCHLORIDE 5 MG: 5 INJECTION INTRAVENOUS at 00:11

## 2021-06-17 RX ADMIN — POTASSIUM CHLORIDE AND SODIUM CHLORIDE: 900; 300 INJECTION, SOLUTION INTRAVENOUS at 21:15

## 2021-06-17 RX ADMIN — GABAPENTIN 100 MG: 100 CAPSULE ORAL at 08:00

## 2021-06-17 RX ADMIN — Medication 5 ML: at 14:00

## 2021-06-17 RX ADMIN — METRONIDAZOLE 500 MG: 500 INJECTION, SOLUTION INTRAVENOUS at 06:20

## 2021-06-17 RX ADMIN — DEXTROSE MONOHYDRATE 15 MG/HR: 50 INJECTION, SOLUTION INTRAVENOUS at 00:07

## 2021-06-17 RX ADMIN — CEFEPIME HYDROCHLORIDE 2 G: 2 INJECTION, POWDER, FOR SOLUTION INTRAVENOUS at 07:59

## 2021-06-17 RX ADMIN — POTASSIUM CHLORIDE AND SODIUM CHLORIDE: 900; 300 INJECTION, SOLUTION INTRAVENOUS at 03:40

## 2021-06-17 RX ADMIN — SODIUM CHLORIDE 40 MG: 9 INJECTION INTRAMUSCULAR; INTRAVENOUS; SUBCUTANEOUS at 21:16

## 2021-06-17 RX ADMIN — SERTRALINE 100 MG: 50 TABLET, FILM COATED ORAL at 08:00

## 2021-06-17 RX ADMIN — METOPROLOL TARTRATE 1.25 MG: 5 INJECTION INTRAVENOUS at 17:33

## 2021-06-17 RX ADMIN — METOPROLOL TARTRATE 1.25 MG: 5 INJECTION INTRAVENOUS at 13:05

## 2021-06-17 RX ADMIN — Medication 10 ML: at 21:15

## 2021-06-17 RX ADMIN — DIGOXIN 0.12 MG: 125 TABLET ORAL at 08:00

## 2021-06-17 RX ADMIN — POTASSIUM CHLORIDE 20 MEQ: 750 TABLET, FILM COATED, EXTENDED RELEASE ORAL at 06:17

## 2021-06-17 RX ADMIN — DEXTROSE MONOHYDRATE 15 MG/HR: 50 INJECTION, SOLUTION INTRAVENOUS at 06:15

## 2021-06-17 RX ADMIN — METRONIDAZOLE 500 MG: 500 INJECTION, SOLUTION INTRAVENOUS at 16:04

## 2021-06-17 RX ADMIN — DEXTROSE MONOHYDRATE 15 MG/HR: 50 INJECTION, SOLUTION INTRAVENOUS at 13:06

## 2021-06-17 RX ADMIN — METRONIDAZOLE 500 MG: 500 INJECTION, SOLUTION INTRAVENOUS at 23:46

## 2021-06-17 RX ADMIN — CEFEPIME HYDROCHLORIDE 2 G: 2 INJECTION, POWDER, FOR SOLUTION INTRAVENOUS at 21:07

## 2021-06-17 RX ADMIN — DEXTROSE MONOHYDRATE 15 MG/HR: 50 INJECTION, SOLUTION INTRAVENOUS at 21:05

## 2021-06-17 RX ADMIN — METOPROLOL TARTRATE 1.25 MG: 5 INJECTION INTRAVENOUS at 23:46

## 2021-06-17 RX ADMIN — Medication 10 ML: at 06:16

## 2021-06-17 RX ADMIN — GABAPENTIN 100 MG: 100 CAPSULE ORAL at 21:22

## 2021-06-17 RX ADMIN — SODIUM CHLORIDE 40 MG: 9 INJECTION INTRAMUSCULAR; INTRAVENOUS; SUBCUTANEOUS at 08:01

## 2021-06-17 NOTE — CONSULTS
Gastroenterology Consultation Note  ZAHEER Bustamante   for Dr. Nahed Brown    NAME: Garo Owen : 1960 MRN: [id number]   ATTG: Dr. Rm Mason PCP: Kong Rodgers NP  Date/Time:  2021 9:01 AM  Subjective:   REASON FOR CONSULT:      Garo Owen is a 62 y.o.  female who I was asked to see for duodenitis/duodenal ulcer. She reports she started with a fever and diarrhea as well as nausea and vomiting. Admits to abdominal pain in her right mid abdomen/flank. No hematemesis, melena or hematochezia. Worried she had COVID. Has received first COVID vaccine but not second one just yet (2 weeks). Tried a BC powder to lower her fever on Monday. She continues on Omeprazole 40mg BID for hx of GERD. No other significant NSAID use, just tylenol. Presented to the ED due to worsening sx/abdominal pain:    WBC initally elevated at 18.6, decreased to 12.4 today, hgb 12.8, down to 10.4 today, Bun 33 down to 26 today, Cr 2.05 initally and improced to 1.34 today, AST initally 64>43 today, Bili 1.4>0.6 today, ALT and Alk phos WNL, lipase and amylase low. CT w/o contrast on : IMPRESSION  RUQ inflammation probably duodenal in origin. Cholelithiasis. Hepatic steatosis. Mild bibasilar atelectasis. RUQ US : IMPRESSION  Technically limited exam. Cholelithiasis. Heterogeneously increased liver  echotexture is nonspecific    She was found to be in [place] [place], cardiology on the case and wanting to start her on [facility]. EGD 19 by Dr. Ching Teran: Findings:   Esophagus: The esophageal mucosa in the proximal, mid and distal esophagus is normal.   The squamo-columnar junction is at 39 cm where the Z-line was noted. There is a 2 cm hiatal hernia. Stomach: The gastric mucosa has erythema in the body with a few fundic gland appearing polyps. Biopsies taken of the mucosa and the polyps.    Duodenum:   The bulb and post bulbar mucosa is normal in appearance.     Biopsies are taken.    Colonoscopy 19: Findings:   · Her colon is mildly redundant, using a pediatric scope. She needed abdominal pressure to get the scope to the cecum. · TI could not be intubated despite trying. · There are no masses seen. · A small, 4 mm, sessile sigmoid colon polyp was removed with a cold biopsy forceps. Medium sized internal hemorrhoids with anal papillae noted. No family hx of colon, stomach or esophageal cancer. +family hx of GB disease. Past Medical History:   Diagnosis Date    Cancer [facility])     cervical    GERD (gastroesophageal reflux disease)     Hypertension     Ill-defined condition     Fibromyalgia      Past Surgical History:   Procedure Laterality Date    COLONOSCOPY N/A 2019    COLON[facility] performed [facility] at [facility] ENDOSCOPY    HX  SECTION      HX GYN      cone resection     Social History     Tobacco Use    Smoking status: Never Smoker    Smokeless tobacco: Never Used   Substance Use Topics    Alcohol use: Yes     Alcohol/week: 2.5 standard drinks     Types: 3 Cans of beer per week     Comment: socially      Family History   Problem Relation Age of Onset    Hypertension Mother     Heart Disease Father     Other Sister         FMS    Pancreatic Cancer Maternal Grandfather 61      Allergies   Allergen Reactions    Pcn [Penicillins] Other (comments)     \"pass out\"    Bactrim [Sulfamethoprim] Other (comments)     \"migraines\"    Metoprolol Tartrate Shortness of Breath      Home Medications:  Prior to Admission Medications   Prescriptions Last Dose Informant Patient Reported? Taking?   diclofenac (VOLTAREN) 1 % gel 6/10/2021 at Unknown time  No Yes   Sig: Apply 2 g to affected area four (4) times daily. gabapentin (NEURONTIN) 100 mg capsule Not Taking at Unknown time  No No   Sig: Take 1 Cap by mouth three (3) times daily. Max Daily Amount: 300 mg.  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 omeprazole (PRILOSEC) 40 mg capsule 6/10/2021 at Unknown time  No Yes   Sig: Take 1 Cap by mouth daily. rosuvastatin (CRESTOR) 20 mg tablet 6/10/2021 at Unknown time  No Yes   Sig: Take 1 Tab by mouth daily. sertraline (ZOLOFT) 100 mg tablet 6/10/2021 at Unknown time  No Yes   Sig: Take 1 Tab by mouth daily.  Indications: ANXIETY WITH DEPRESSION   tiZANidine (ZANAFLEX) 2 mg tablet 6/10/2021 at Unknown time  No Yes   Sig: TAKE 1 TABLET BY MOUTH EVERY 8 HOURS AS NEEDED      Facility-Administered Medications: None     Hospital medications:  Current Facility-Administered Medications   Medication Dose Route Frequency    gabapentin (NEURONTIN) capsule 100 mg  100 mg Oral TID    sertraline (ZOLOFT) tablet 100 mg  100 mg Oral DAILY    sodium chloride (NS) flush 5-40 mL  5-40 mL IntraVENous Q8H    sodium chloride (NS) flush 5-40 mL  5-40 mL IntraVENous PRN    acetaminophen (TYLENOL) tablet 650 mg  650 mg Oral Q6H PRN    Or    acetaminophen (TYLENOL) suppository 650 mg  650 mg Rectal Q6H PRN    polyethylene glycol (MIRALAX) packet 17 g  17 g Oral DAILY PRN    ondansetron (ZOFRAN ODT) tablet 4 mg  4 mg Oral Q8H PRN    Or    ondansetron (ZOFRAN) injection 4 mg  4 mg IntraVENous Q6H PRN    0.9% sodium chloride with KCl 40 mEq/L infusion   IntraVENous CONTINUOUS    digoxin (LANOXIN) tablet 0.125 mg  0.125 mg Oral DAILY    dilTIAZem (CARDIZEM) 100 mg in dextrose 5% (MBP/ADV) 100 mL infusion  0-15 mg/hr IntraVENous TITRATE    acetaminophen (TYLENOL) tablet 650 mg  650 mg Oral Q6H PRN    ondansetron (ZOFRAN) injection 4 mg  4 mg IntraVENous Q4H PRN    pantoprazole (PROTONIX) 40 mg in 0.9% sodium chloride 10 mL injection  40 mg IntraVENous Q12H    cefepime (MAXIPIME) 2 g in 0.9% sodium chloride (MBP/ADV) 100 mL MBP  2 g IntraVENous Q12H    metroNIDAZOLE (FLAGYL) IVPB premix 500 mg  500 mg IntraVENous Q8H    morphine injection 1 mg  1 mg IntraVENous Q4H PRN     REVIEW OF SYSTEMS:     []     Unable to obtain  ROS due to  []    mental status change  []    sedated   []    intubated  Review of Systems -   History obtained from the patient  General ROS: positive for  - chills and fever  Psychological ROS: negative for - anxiety or depression  Hematological and Lymphatic ROS: negative for - bleeding problems  Respiratory ROS: positive for - cough  Cardiovascular ROS: no chest pain or dyspnea on exertion  Gastrointestinal ROS:See HPI  Genito-Urinary ROS: no dysuria, trouble voiding, or hematuria  Musculoskeletal ROS: negative for - joint pain  Neurological ROS: no TIA or stroke symptoms  Dermatological ROS: negative for - rash    Objective:   VITALS:    Visit Vitals  /78   Pulse (!) 115   Temp 98.8 °F (37.1 °C)   Resp 20   Ht 5' 6\" (1.676 m)   Wt 128.4 kg (283 lb)   SpO2 92%   BMI 45.68 kg/m²     Temp (24hrs), Av °F (37.2 °C), Min:97.9 °F (36.6 °C), Max:100.1 °F (37.8 °C)    PHYSICAL EXAM:   General:    Drowsy but cooperative, no distress, appears stated age. Head:   Normocephalic, without obvious abnormality, atraumatic. Eyes:   Conjunctivae clear, anicteric sclerae. Pupils are equal  Nose:  Nares normal. No drainage or sinus tenderness. Throat:    Lips, mucosa, and tongue normal.  No Thrush  Neck:  Supple, symmetrical,  no adenopathy, thyroid: non tender  Back:    Symmetric,  No CVA tenderness. Lungs:   CTA bilaterally. No wheezing/rhonchi/rales. Heart:   Irregularly irregula4,  no murmur, rub or gallop. Abdomen:   Soft, +Epigastric and RUQ tenderness. Not distended. Bowel sounds normal. No masses. No                       hepatosplenomegaly. Rectal:  Deferred  Extremities: No cyanosis. No edema. No clubbing  Skin:     Texture, turgor normal. No rashes/lesions/jaundice  Lymph: Cervical, supraclavicular normal.  Psych:  Good insight. Not depressed.   Not anxious or agitated. Neurologic: EOMs intact. No facial asymmetry. No aphasia or slurred speech normal strength, A/O X 3. LAB DATA REVIEWED:    Lab Results   Component Value Date/Time    WBC 12.4 (H) 06/17/2021 03:33 AM    HGB 10.4 (L) 06/17/2021 03:33 AM    HCT 33.6 (L) 06/17/2021 03:33 AM    PLATELET 229 86/75/2480 03:33 AM    MCV 86.8 06/17/2021 03:33 AM     Lab Results   Component Value Date/Time    ALT (SGPT) 54 06/17/2021 03:33 AM    Alk. phosphatase 91 06/17/2021 03:33 AM    Bilirubin, total 0.6 06/17/2021 03:33 AM     Lab Results   Component Value Date/Time    Sodium 135 (L) 06/17/2021 03:33 AM    Potassium 3.1 (L) 06/17/2021 03:33 AM    Chloride 103 06/17/2021 03:33 AM    CO2 26 06/17/2021 03:33 AM    Anion gap 6 06/17/2021 03:33 AM    Glucose 106 (H) 06/17/2021 03:33 AM    BUN 26 (H) 06/17/2021 03:33 AM    Creatinine 1.34 (H) 06/17/2021 03:33 AM    BUN/Creatinine ratio 19 06/17/2021 03:33 AM    GFR est AA 49 (L) 06/17/2021 03:33 AM    GFR est non-AA 40 (L) 06/17/2021 03:33 AM    Calcium 7.9 (L) 06/17/2021 03:33 AM     Lab Results   Component Value Date/Time    Lipase 70 (L) 06/17/2021 03:33 AM     No results found for: INR, PTMR, PTP, PT1, PT2, INREXT    CT Results (most recent):  Results from Hospital Encounter encounter on 06/16/21    CT CHEST WO CONT    Narrative  INDICATION:  tachycardia, hypotensive, hypoxia    EXAM: CT Chest, Abdomen and Pelvis. CT dose reduction was achieved through the  use of a standardized protocol tailored for this examination and automatic  exposure control for dose modulation. CONTRAST: No IV contrast. No oral contrast.    CHEST:  The lungs show minimal bibasilar atelectasis. There is no significant  mediastinal or hilar adenopathy. There is no pleural or pericardial effusion. Visualized thyroid and lower neck soft tissues are unremarkable for age.     ABDOMEN PELVIS:  There is inflammatory stranding in the right upper quadrant extending  anterolaterally from the second portion of the duodenum to the ascending colon. This may be due to duodenal ulcer/duodenitis. There is no pneumoperitoneum. There is no apparent fluid collection on  unenhanced imaging. There is mild reactive type adenopathy. Pancreas is normal.    Gallbladder contains a stone, without apparent inflammation. Liver shows steatosis. Bile ducts and spleen are not enlarged. Adrenal glands  are normal in size. Kidneys show no mass or hydronephrosis. Aorta shows no  aneurysm. The appendix is normal.    The bladder is empty. There is no pelvic mass. Impression  RUQ inflammation probably duodenal in origin. Cholelithiasis. Hepatic steatosis. Mild bibasilar atelectasis. US Results (most recent):  Results from [place] encounter on 06/16/21    US ABD LTD    Narrative  INDICATION:  Right upper abdominal pain    EXAMINATION:  ULTRASOUND ABDOMEN, limited    COMPARISON:  CT abdomen and pelvis 6/16/2021    FINDINGS:    Routine ultrasound images of the abdomen were obtained. The liver is heterogeneous increase in echotexture without definite focal mass. The common bile duct is normal measuring 3 mm in diameter. The gallbladder  contains moderate size stones but is otherwise unremarkable in appearance. The  main portal vein is patent with hepatopedal flow. The pancreas is obscured by bowel gas. The right kidney measures 10.1 cm in length. No focal lesion or hydronephrosis. Impression  Technically limited exam. Cholelithiasis. Heterogeneously increased liver  echotexture is nonspecific      Impression:  Active Problems:    Atrial fibrillation with RVR (HCC) (6/16/2021)      Nausea & vomiting (6/17/2021)      Diarrhea (6/17/2021)      Dehydration (6/17/2021)      Acute kidney injury ([address] (6/17/2021)      Cholelithiasis (6/17/2021)      Plan:  Patient with N/V and diarrhea with subjective fever concerning for gastroenteritis.   CT showing some RUQ inflammation that may be due to duodenitis/duodenal ulcer.  Cardiology wants to start on [facility] for new dx of [place], requests EGD prior to starting. Reasonable to do once patient is stable with improved HR, possibly tomorrow. EGD in 2019 was fairly non revealing. Given sx will check enteric pathogen panel, WBC and O&P. Also has gallstones on US and CT, proceed with HIDA as that can contribute to N/V.   Can have CLD today, NPO after midnight.          ___________________________________________________  Care Plan discussed with:    [x]    Patient   []    Family   [x]    Nursing   []    Attending  Total Time :  30   minutes   ___________________________________________________  GI: ZAHEER Monreal

## 2021-06-17 NOTE — PROGRESS NOTES
06/17/21 0241   Vital Signs   Temp 98.7 °F (37.1 °C)   Temp Source Oral   Pulse (Heart Rate) (!) 128   Heart Rate Source Monitor   Resp Rate 27   O2 Sat (%) 92 %   Level of Consciousness Alert (0)   /80   MAP (Calculated) 90   BP 1 Method Automatic   BP 1 Location Left upper arm   BP Patient Position At rest   MEWS Score 4   on Dilt. drip

## 2021-06-17 NOTE — PROGRESS NOTES
Received message from patient's nurse Shawna Mason stating :    Diltiazem drip was bumped up to 15mg/hr at 2024. BP is good 117/72 but HR continues to bounces around the 130-140s. Should we give it more time or should something else be given in instead? Discussion / orders:    Patient has allergy to metoprolol  Ordered one-time extra dose of Cardizem 5 mg IV push           Please note that this note was dictated using Dragon computer voice recognition software. Quite often unanticipated grammatical, syntax, homophones, and other interpretive errors are inadvertently transcribed by the computer software. Please disregard these errors. Please excuse any errors that have escaped final proofreading.

## 2021-06-17 NOTE — PROGRESS NOTES
[address] INTERDISCIPLINARY ROUNDS    Cardiopulmonary Care Interdisciplinary Rounds were held today to discuss patient's plan of care and outcomes. The following members were present: NP/Physician, Pharmacy, Nursing and Case Management. PLAN OF CARE:   Continue current treatment plan. Cardiology and GI to see pt today.      Expected Length of Stay:  - - -

## 2021-06-17 NOTE — PROGRESS NOTES
0700: End of Shift Note    Bedside shift change report given to Vicky Murphy, [facility] (oncoming nurse) by Silvana Chaparro (offgoing nurse). Report included the following information SBAR, Kardex, Intake/Output, MAR, Recent Results and Cardiac Rhythm A-fib, tachy    Shift worked:  1631-0043     Shift summary and any significant changes:     received pt from the ED 2130.   2340: messaged NP about HR. Dilt drip was 15 but HR was not really improving. Ordered one time dose of cardizem push. HR settled to 110s-120. NP did not want to change/add anything d/t BP. K with labs was 3.1. NS with 40K was started right after. 20K PO ordered. Concerns for physician to address:       Zone phone for oncoming shift:          Activity:  Activity Level: Up with Assistance  Number times ambulated in hallways past shift: 0  Number of times OOB to chair past shift: 0    Cardiac:   Cardiac Monitoring: Yes      Cardiac Rhythm: Atrial Fib    Access:   Current line(s): PIV     Genitourinary:   Urinary status: voiding and external catheter    Respiratory:   O2 Device: Nasal cannula  Chronic home O2 use?: NO  Incentive spirometer at bedside: NO     GI:     Current diet:  DIET NPO Sips of Water with Meds, Ice Chips  Passing flatus: YES  Tolerating current diet: YES       Pain Management:   Patient states pain is manageable on current regimen: YES    Skin:  Brandon Score: 18  Interventions: increase time out of bed and internal/external urinary devices    Patient Safety:  Fall Score:  Total Score: 2  Interventions: assistive device (walker, cane, etc), gripper socks and pt to call before getting OOB       Length of Stay:  Expected LOS: - - -  Actual LOS: 1 [facility] III/VI/positive S1/murmur/positive S2

## 2021-06-17 NOTE — PROGRESS NOTES
06/16/21 2245   Vital Signs   Temp 100.1 °F (37.8 °C)   Temp Source Oral   Pulse (Heart Rate) (!) 129   Heart Rate Source Monitor   Resp Rate 26   O2 Sat (%) 91 %   Level of Consciousness Alert (0)   /72   MAP (Calculated) 87   BP 1 Method Automatic   BP 1 Location Left upper arm   BP Patient Position At rest   MEWS Score 4   On dilt drip for HR

## 2021-06-17 NOTE — PROGRESS NOTES
Problem: Falls - Risk of  Goal: *Absence of Falls  Description: Document Clare Mcbride Fall Risk and appropriate interventions in the flowsheet. Outcome: Progressing Towards Goal  Note: Fall Risk Interventions:  Mobility Interventions: Bed/chair exit alarm, Patient to call before getting OOB         Medication Interventions: Patient to call before getting OOB                   Problem: Afib Pathway: Day 1  Goal: Activity/Safety  Outcome: Progressing Towards Goal  Goal: Consults, if ordered  Outcome: Progressing Towards Goal  Goal: Respiratory  Outcome: Progressing Towards Goal  Goal: Treatments/Interventions/Procedures  Outcome: Progressing Towards Goal  Goal: Psychosocial  Outcome: Progressing Towards Goal  Goal: *Optimal pain control at patient's stated goal  Outcome: Progressing Towards Goal  Goal: *Hemodynamically stable  Outcome: Progressing Towards Goal  Goal: *Lungs clear or at baseline  Outcome: Progressing Towards Goal     Problem: Pressure Injury - Risk of  Goal: *Prevention of pressure injury  Description: Document Brandon Scale and appropriate interventions in the flowsheet.   Outcome: Progressing Towards Goal  Note: Pressure Injury Interventions:       Moisture Interventions: Absorbent underpads, Internal/External urinary devices, Minimize layers, Apply protective barrier, creams and emollients    Activity Interventions: Increase time out of bed, Pressure redistribution bed/mattress(bed type)    Mobility Interventions: HOB 30 degrees or less, Pressure redistribution bed/mattress (bed type)    Nutrition Interventions: Document food/fluid/supplement intake, Offer support with meals,snacks and hydration

## 2021-06-17 NOTE — H&P
Hospitalist Admission Note    NAME: Adam Boss   :  1960   MRN:  [id number]     Date/Time:  2021 1:42 AM    Patient PCP: Maame Ryan, CAIO  ______________________________________________________________________  Given the patient's current clinical presentation, I have a high level of concern for decompensation if discharged from the emergency department. Complex decision making was performed, which includes reviewing the patient's available past medical records, laboratory results, and x-ray films. My assessment of this patient's clinical condition and my plan of care is as follows. Assessment / Plan:    Duodenitis/duodenal ulcer  Sepsis d/t the above  Lactic acidosis   Elevated LFTs d/t the above  CT w/o contrast reported inflammatory stranding in the right upper quadrant extending  anterolaterally from the second portion of the duodenum to the ascending colon  GI and Sx consult   Consider repeating CT w contrast after hydration and improving renal function for better evaluation  Check amylase and lipase   IVF  IV ABx, f/u Cx  Clear liquids  Hold of AC for now  IV PPI BID  Hold statins    New A. Flutter  Dizziness   IVF  Dilt drip  Add digoxin IV given low BP  As per documentation, pt developed SOB after starting metoprolol  Correct electrolytes   Cardio consult  TSH wnl  ECHO in AM  Will hold off AC for now as she is Buddhism and may have underlying duodenal ulcer, CHADSVASC score is 2 for gender and h/o HTN   Dizziness likely from A Flutter/hypotension, no focal deficit, hold off brain imaging for now    Acute hypoxic resp failure 2/2 bibasilar ATX  VQ scan neg  CT chest w mild bibasilar ATX  IS  Cont pulse ox  ?  Obesity hypoventilation syndrome, sleep study as outpt    MINDY  Hold diuretics and losartan  IVF  Avoid nephrotoxic meds    H/o HTN/HLD  Hold home anti HTN meds  Hold statin given elevated LFTs    Code Status: full   Surrogate Decision Maker:  DVT Prophylaxis: SCDs  GI Prophylaxis: not indicated  Baseline: independent       Subjective:   CHIEF COMPLAINT: SOB, fever, abd pain, generalized weakness, dizziness    HISTORY OF PRESENT ILLNESS:     Kishor Kwon is a 62 y.o.  female who presents with the above CC. Pt started having abd pain and fever about 3 days ago, temp up to 103, associated with biliary vomiting and watery diarrhea (biliary, non bloody). She also noticed getting dizziness, worse w exertion and mild SOB. Pt denies UTI symptoms, chest pain, syncope, focal weakness/numbness, HA, change in vision, slurred speech, and bleeding issues. Pt is social drinker. Had prior EGD/[facility] that reported polyps and hemorrhoids. Upon arrival to ER, BP was low and improved after 4 liters of IVF, sats in high 80s, EKG showed Atrial flutter with variable AV block w HR in 130s. Blood work remarkable for WBC of 18.6, D Dimer 3.3, K 2.4, BUN 33, Cr 2, Bili 1.4, ALT 80, AST 64, lactic acid 2.8  Trop, TSH wnl. Rapid covid neg. NM LUNG SCAN PERF    Result Date: 6/16/2021  Low probability for pulmonary embolism. CT CHEST WO CONT    Result Date: 6/16/2021  RUQ inflammation probably duodenal in origin. Cholelithiasis. Hepatic steatosis. Mild bibasilar atelectasis. CT ABD PELV WO CONT    Result Date: 6/16/2021  RUQ inflammation probably duodenal in origin. Cholelithiasis. Hepatic steatosis. Mild bibasilar atelectasis. US ABD LTD    Result Date: 6/16/2021  Technically limited exam. Cholelithiasis. Heterogeneously increased liver echotexture is nonspecific     XR CHEST PORT    Result Date: 6/16/2021  No acute process. We were asked to admit for work up and evaluation of the above problems.      Past Medical History:   Diagnosis Date    Cancer [facility]) 1986    cervical    GERD (gastroesophageal reflux disease)     Hypertension     Ill-defined condition     Fibromyalgia        Past Surgical History:   Procedure Laterality Date    COLONOSCOPY N/A 2019    COLONOSCOPY performed by Be Mtz MD at [facility] ENDOSCOPY    HX  SECTION      HX GYN      cone resection       Social History     Tobacco Use    Smoking status: Never Smoker    Smokeless tobacco: Never Used   Substance Use Topics    Alcohol use: Yes     Alcohol/week: 2.5 standard drinks     Types: 3 Cans of beer per week     Comment: socially        Family History   Problem Relation Age of Onset    Hypertension Mother     Heart Disease Father     Other Sister         FMS    Pancreatic Cancer Maternal Grandfather 61     Allergies   Allergen Reactions    Pcn [Penicillins] Other (comments)     \"pass out\"    Bactrim [Sulfamethoprim] Other (comments)     \"migraines\"    Metoprolol Tartrate Shortness of Breath        Prior to Admission medications    Medication Sig Start Date End Date Taking? Authorizing Provider   gabapentin (NEURONTIN) 100 mg capsule Take 1 Cap by mouth three (3) times daily. Max Daily Amount: 300 mg. Indications: nerve pain after herpes    Jasvir Aguillon MD   hydroCHLOROthiazide (MICROZIDE) 12.5 mg capsule Take 12.5 mg by mouth daily. Provider, Historical   tiZANidine (ZANAFLEX) 2 mg tablet TAKE 1 TABLET BY MOUTH EVERY 8 HOURS AS NEEDED 18   Fanny Bonilla MD   sertraline (ZOLOFT) 100 mg tablet Take 1 Tab by mouth daily. Indications: ANXIETY WITH DEPRESSION 8/15/18   Fanny Bonilla MD   rosuvastatin (CRESTOR) 20 mg tablet Take 1 Tab by mouth daily. 18   Fanny Bonilla MD   omeprazole (PRILOSEC) 40 mg capsule Take 1 Cap by mouth daily. 18   Fanny Bonilla MD   diclofenac (VOLTAREN) 1 % gel Apply 2 g to affected area four (4) times daily. 18   Jammie Mercado MD       REVIEW OF SYSTEMS:     Total of 12 systems reviewed, negative except for the above.     Objective:   VITALS:    Visit Vitals  /84   Pulse (!) 139   Temp 100.1 °F (37.8 °C)   Resp 26   Ht 5' 6\" (1.676 m)   Wt 128.4 kg (283 lb)   SpO2 91%   BMI 45.68 kg/m²       Intake/Output Summary (Last 24 hours) at 6/17/2021 0142  Last data filed at 6/16/2021 1649  Gross per 24 hour   Intake 4250 ml   Output    Net 4250 ml      Wt Readings from Last 10 Encounters:   06/16/21 128.4 kg (283 lb)   08/21/20 124.7 kg (275 lb)   04/15/20 124.7 kg (275 lb)   02/13/19 131.1 kg (289 lb)   08/28/18 134.3 kg (296 lb)   08/15/18 134.3 kg (296 lb)   08/02/18 134.3 kg (296 lb)   07/05/18 130.4 kg (287 lb 8 oz)   05/16/18 130.2 kg (287 lb)   11/04/16 134.7 kg (296 lb 15.4 oz)       PHYSICAL EXAM:    General:    Alert, cooperative, no distress, appears stated age. HEENT: Atraumatic, anicteric sclerae, pink conjunctivae, MMM  Neck:  Supple, symmetrical  Lungs:   CTA. No Wheezing/Rhonchi. No rales. No tenderness  No Accessory muscle use. Heart:   Irregularly irregular rhythm. No murmur. No JVD   Abdomen:   Soft, RUQ tenderness. ND.  BS normal  Extremities: No edema. No cyanosis. No clubbing. Skin:     Not pale. Not Jaundiced. No rashes   Psych:  Good insight. Not depressed. Not anxious or agitated. Neurologic: Alert and oriented X 4. EOMs intact. No facial asymmetry. No slurred speech.  Symmetrical strength, Sensation grossly intact.     _______________________________________________________________________  Care Plan discussed with:    Comments   Patient x    Family      RN     Care Manager                    Consultant:      _______________________________________________________________________  Expected  Disposition:   Home with Family x   HH/PT/OT/RN    SNF/LTC    HARRIS    ________________________________________________________________________  TOTAL TIME:  46 Minutes    Critical Care Provided     Minutes non procedure based      Comments    x Reviewed previous records   >50% of visit spent in counseling and coordination of care x Discussion with patient and/or family and questions answered       ________________________________________________________________________  Signed: Zachary Hamm MD    Procedures: see electronic medical records for all procedures/Xrays and details which were not copied into this note but were reviewed prior to creation of Plan. LAB DATA REVIEWED:    Recent Results (from the past 24 hour(s))   EKG, 12 LEAD, INITIAL    Collection Time: 06/16/21 10:51 AM   Result Value Ref Range    Ventricular Rate 143 BPM    Atrial Rate 300 BPM    QRS Duration 80 ms    Q-T Interval 342 ms    QTC Calculation (Bezet) 527 ms    Calculated R Axis 44 degrees    Calculated T Axis 85 degrees    Diagnosis       Atrial flutter with variable AV block  Nonspecific ST and T wave abnormality  When compared with ECG of 04-NOV-2016 07:23,  Atrial flutter has replaced Sinus rhythm  Vent. rate has increased BY  63 BPM    Confirmed by Christy Nguyen ([id number]) on 6/16/2021 6:51:08 PM     CBC WITH AUTOMATED DIFF    Collection Time: 06/16/21 11:03 AM   Result Value Ref Range    WBC 18.6 (H) 3.6 - 11.0 K/uL    RBC 4.72 3.80 - 5.20 M/uL    HGB 12.8 11.5 - 16.0 g/dL    HCT 39.6 35.0 - 47.0 %    MCV 83.9 80.0 - 99.0 FL    MCH 27.1 26.0 - 34.0 PG    MCHC 32.3 30.0 - 36.5 g/dL    RDW 22.0 (H) 11.5 - 14.5 %    PLATELET 725 327 - 427 K/uL    MPV 9.7 8.9 - 12.9 FL    NRBC 0.0 0  WBC    ABSOLUTE NRBC 0.00 0.00 - 0.01 K/uL    NEUTROPHILS 95 (H) 32 - 75 %    LYMPHOCYTES 2 (L) 12 - 49 %    MONOCYTES 2 (L) 5 - 13 %    EOSINOPHILS 0 0 - 7 %    BASOPHILS 0 0 - 1 %    IMMATURE GRANULOCYTES 1 (H) 0.0 - 0.5 %    ABS. NEUTROPHILS 17.6 (H) 1.8 - 8.0 K/UL    ABS. LYMPHOCYTES 0.4 (L) 0.8 - 3.5 K/UL    ABS. MONOCYTES 0.4 0.0 - 1.0 K/UL    ABS. EOSINOPHILS 0.0 0.0 - 0.4 K/UL    ABS. BASOPHILS 0.0 0.0 - 0.1 K/UL    ABS. IMM.  GRANS. 0.2 (H) 0.00 - 0.04 K/UL    DF SMEAR SCANNED      RBC COMMENTS ANISOCYTOSIS  2+       METABOLIC PANEL, COMPREHENSIVE    Collection Time: 06/16/21 11:03 AM   Result Value Ref Range    Sodium 134 (L) 136 - 145 mmol/L    Potassium 2.4 (LL) 3.5 - 5.1 mmol/L    Chloride 99 97 - 108 mmol/L    CO2 24 21 - 32 mmol/L    Anion gap 11 5 - 15 mmol/L    Glucose 143 (H) 65 - 100 mg/dL    BUN 33 (H) 6 - 20 MG/DL    Creatinine 2.05 (H) 0.55 - 1.02 MG/DL    BUN/Creatinine ratio 16 12 - 20      GFR est AA 30 (L) >60 ml/min/1.73m2    GFR est non-AA 25 (L) >60 ml/min/1.73m2    Calcium 8.7 8.5 - 10.1 MG/DL    Bilirubin, total 1.4 (H) 0.2 - 1.0 MG/DL    ALT (SGPT) 80 (H) 12 - 78 U/L    AST (SGOT) 64 (H) 15 - 37 U/L    Alk. phosphatase 108 45 - 117 U/L    Protein, total 8.5 (H) 6.4 - 8.2 g/dL    Albumin 3.2 (L) 3.5 - 5.0 g/dL    Globulin 5.3 (H) 2.0 - 4.0 g/dL    A-G Ratio 0.6 (L) 1.1 - 2.2     TROPONIN I    Collection Time: 06/16/21 11:03 AM   Result Value Ref Range    Troponin-I, Qt. <0.05 <0.05 ng/mL   SAMPLES BEING HELD    Collection Time: 06/16/21 11:03 AM   Result Value Ref Range    SAMPLES BEING HELD  SST, RED, BLUE     COMMENT        Add-on orders for these samples will be processed based on acceptable specimen integrity and analyte stability, which may vary by analyte.    MAGNESIUM    Collection Time: 06/16/21 11:03 AM   Result Value Ref Range    Magnesium 1.7 1.6 - 2.4 mg/dL   TSH 3RD GENERATION    Collection Time: 06/16/21 11:03 AM   Result Value Ref Range    TSH 3.53 0.36 - 3.74 uIU/mL   COVID-19 RAPID TEST    Collection Time: 06/16/21 11:54 AM   Result Value Ref Range    Specimen source Nasopharyngeal      COVID-19 rapid test Not detected NOTD     BLOOD GAS,LACTIC ACID, POC    Collection Time: 06/16/21 12:02 PM   Result Value Ref Range    pH (POC) 7.46 (H) 7.35 - 7.45      pCO2 (POC) 31.8 (L) 35.0 - 45.0 MMHG    pO2 (POC) 40 (LL) 80 - 100 MMHG    HCO3 (POC) 22.8 22 - 26 MMOL/L    sO2 (POC) 78.8 (L) 92 - 97 %    Base deficit (POC) 0.2 mmol/L    Specimen type (POC) VENOUS BLOOD      Performed by Janice Klein RN     Lactic Acid (POC) 2.18 (HH) 0.40 - 2.00 mmol/L    Critical value read back Romulo Oseguera DIMER    Collection Time: 06/16/21  3:23 PM   Result Value Ref Range    D-dimer 3.30 (H) 0.00 - 0.65 mg/L FEU   POC LACTIC ACID    Collection Time: 06/16/21  3:25 PM   Result Value Ref Range    Lactic Acid (POC) 1.07 0.40 - 2.00 mmol/L   URINALYSIS W/ REFLEX CULTURE    Collection Time: 06/16/21  4:12 PM    Specimen: Urine   Result Value Ref Range    Color YELLOW/STRAW      Appearance TURBID (A) CLEAR      Specific gravity 1.021 1.003 - 1.030      pH (UA) 5.5 5.0 - 8.0      Protein 30 (A) NEG mg/dL    Glucose Negative NEG mg/dL    Ketone Negative NEG mg/dL    Bilirubin Negative NEG      Blood MODERATE (A) NEG      Urobilinogen 0.2 0.2 - 1.0 EU/dL    Nitrites Negative NEG      Leukocyte Esterase Negative NEG      WBC 0-4 0 - 4 /hpf    RBC 0-5 0 - 5 /hpf    Epithelial cells FEW FEW /lpf    Bacteria Negative NEG /hpf    UA:UC IF INDICATED CULTURE NOT INDICATED BY UA RESULT CNI      Hyaline cast 2-5 0 - 5 /lpf     NM LUNG SCAN PERF    Result Date: 6/16/2021  Low probability for pulmonary embolism. CT CHEST WO CONT    Result Date: 6/16/2021  RUQ inflammation probably duodenal in origin. Cholelithiasis. Hepatic steatosis. Mild bibasilar atelectasis. CT ABD PELV WO CONT    Result Date: 6/16/2021  RUQ inflammation probably duodenal in origin. Cholelithiasis. Hepatic steatosis. Mild bibasilar atelectasis. US ABD LTD    Result Date: 6/16/2021  Technically limited exam. Cholelithiasis. Heterogeneously increased liver echotexture is nonspecific     XR CHEST PORT    Result Date: 6/16/2021  No acute process.         Current Medications:     Current Facility-Administered Medications:     dilTIAZem (CARDIZEM) 100 mg in dextrose 5% (MBP/ADV) 100 mL infusion, 0-15 mg/hr, IntraVENous, TITRATE, Janice Hollingsworth MD, Last Rate: 15 mL/hr at 06/17/21 0007, 15 mg/hr at 06/17/21 0007    0.9% sodium chloride infusion, 125 mL/hr, IntraVENous, CONTINUOUS, Janice Hollingsworth MD, Last Rate: 125 mL/hr at 06/16/21 1940, 125 mL/hr at 06/16/21 1940    acetaminophen (TYLENOL) tablet 650 mg, 650 mg, Oral, Q6H PRN, Janice Hollingsworth MD    ondansetron [facility]) injection 4 mg, 4 mg, IntraVENous, Q4H PRN, Shavonne Ingram MD    pantoprazole (PROTONIX) 40 mg in 0.9% sodium chloride 10 mL injection, 40 mg, IntraVENous, Q12H, Maxi Meehan MD, 40 mg at 06/16/21 2105    cefepime (MAXIPIME) 2 g in 0.9% sodium chloride (MBP/ADV) 100 mL MBP, 2 g, IntraVENous, Q12H, Maxi Meehan MD, Last Rate: 200 mL/hr at 06/16/21 2152, 2 g at 06/16/21 2152    metroNIDAZOLE (FLAGYL) IVPB premix 500 mg, 500 mg, IntraVENous, Q8H, Maxi Meehan MD, Last Rate: 100 mL/hr at 06/16/21 2249, 500 mg at 06/16/21 2249    morphine injection 1 mg, 1 mg, IntraVENous, Q4H PRN, Dank Meehan MD, 1 mg at 06/16/21 2105

## 2021-06-17 NOTE — CONSULTS
Cardiology Consult Note       Date of  Admission: 2021 11:16 AM     Admission type:Emergency     Subjective:       Ms. Merle Zhou is admitted with abdominal pain. Noted to have afib/flutter with RVR for which we are consulted. Her w/u has revealed duodenal ulcer, inflammation. She had fever up to 103. She  started having abd pain and fever about 3 days ago, temp up to 103, associated with biliary vomiting and watery diarrhea (biliary, non bloody). She also noticed getting dizziness, worse w exertion and mild SOB. Denies any chest pain. No prior cardiac history. Had normal stress test, echo in 2018. Seen by Dr. Tory Camarena. She is on IV cardizem drip with -120. BP low normal. Echo pending. Creatinine, LFT's mildly elevated.      Patient Active Problem List    Diagnosis Date Noted    Nausea & vomiting 2021    Diarrhea 2021    Dehydration 2021    Acute kidney injury ([address] 2021    Cholelithiasis 2021    Atrial fibrillation with RVR ([address] 2021    Obesity, morbid ([address] 2018    Gastroesophageal reflux disease without esophagitis 2018    Hypovitaminosis D 2010    Plantar fasciitis 2010    Cancer ([facility]       Sharla Carson NP  Past Medical History:   Diagnosis Date    Cancer [facility])     cervical    GERD (gastroesophageal reflux disease)     Hypertension     Ill-defined condition     Fibromyalgia      Past Surgical History:   Procedure Laterality Date    COLONOSCOPY N/A 2019    COL[facility] performed by [facility] at [facility] ENDOSCOPY    HX  SECTION      HX GYN      cone resection     Allergies   Allergen Reactions    Pcn [Penicillins] Other (comments)     \"pass out\"    Bactrim [Sulfamethoprim] Other (comments)     \"migraines\"    Metoprolol Tartrate Shortness of Breath      Family History   Problem Relation Age of Onset    Hypertension Mother     Heart Disease Father    Thi Burris Other Sister         FMS    Pancreatic Cancer Maternal Grandfather 60      Current Facility-Administered Medications   Medication Dose Route Frequency    gabapentin (NEURONTIN) capsule 100 mg  100 mg Oral TID    sertraline (ZOLOFT) tablet 100 mg  100 mg Oral DAILY    sodium chloride (NS) flush 5-40 mL  5-40 mL IntraVENous Q8H    sodium chloride (NS) flush 5-40 mL  5-40 mL IntraVENous PRN    acetaminophen (TYLENOL) tablet 650 mg  650 mg Oral Q6H PRN    Or    acetaminophen (TYLENOL) suppository 650 mg  650 mg Rectal Q6H PRN    polyethylene glycol (MIRALAX) packet 17 g  17 g Oral DAILY PRN    ondansetron (ZOFRAN ODT) tablet 4 mg  4 mg Oral Q8H PRN    Or    ondansetron (ZOFRAN) injection 4 mg  4 mg IntraVENous Q6H PRN    0.9% sodium chloride with KCl 40 mEq/L infusion   IntraVENous CONTINUOUS    digoxin (LANOXIN) tablet 0.125 mg  0.125 mg Oral DAILY    metoprolol (LOPRESSOR) injection 1.25 mg  1.25 mg IntraVENous ONCE    metoprolol (LOPRESSOR) injection 1.25 mg  1.25 mg IntraVENous Q6H    dilTIAZem (CARDIZEM) 100 mg in dextrose 5% (MBP/ADV) 100 mL infusion  0-15 mg/hr IntraVENous TITRATE    acetaminophen (TYLENOL) tablet 650 mg  650 mg Oral Q6H PRN    ondansetron (ZOFRAN) injection 4 mg  4 mg IntraVENous Q4H PRN    pantoprazole (PROTONIX) 40 mg in 0.9% sodium chloride 10 mL injection  40 mg IntraVENous Q12H    cefepime (MAXIPIME) 2 g in 0.9% sodium chloride (MBP/ADV) 100 mL MBP  2 g IntraVENous Q12H    metroNIDAZOLE (FLAGYL) IVPB premix 500 mg  500 mg IntraVENous Q8H    morphine injection 1 mg  1 mg IntraVENous Q4H PRN         Review of Symptoms:  A comprehensive review of systems was negative except for that written in the HPI. Physical Exam    Visit Vitals  /74   Pulse 97   Temp 98.1 °F (36.7 °C)   Resp 20   Ht 5' 6\" (1.676 m)   Wt 283 lb (128.4 kg)   SpO2 93%   BMI 45.68 kg/m²     General Appearance:  Well developed, well nourished,alert and oriented x 3, and individual in no acute distress.    Ears/Nose/Mouth/Throat: Hearing grossly normal.         Neck: Supple. Chest:   Lungs clear to auscultation bilaterally. Cardiovascular:  irregular rate and rhythm, S1, S2 normal, no murmur. Abdomen:   Soft, tender, bowel sounds are active. Extremities: No edema bilaterally. Skin: Warm and dry.                Cardiographics    Telemetry: AFIB  ECG:  atrial fibrillation, rate 140  Echocardiogram: Not done    Labs:   Recent Results (from the past 24 hour(s))   COVID-19 RAPID TEST    Collection Time: 06/16/21 11:54 AM   Result Value Ref Range    Specimen source Nasopharyngeal      COVID-19 rapid test Not detected NOTD     BLOOD GAS,LACTIC ACID, POC    Collection Time: 06/16/21 12:02 PM   Result Value Ref Range    pH (POC) 7.46 (H) 7.35 - 7.45      pCO2 (POC) 31.8 (L) 35.0 - 45.0 MMHG    pO2 (POC) 40 (LL) 80 - 100 MMHG    HCO3 (POC) 22.8 22 - 26 MMOL/L    sO2 (POC) 78.8 (L) 92 - 97 %    Base deficit (POC) 0.2 mmol/L    Specimen type (POC) VENOUS BLOOD      Performed by Nicolette Fleming RN     Lactic Acid (POC) 2.18 (HH) 0.40 - 2.00 mmol/L    Critical value read back Gene Elder DIMER    Collection Time: 06/16/21  3:23 PM   Result Value Ref Range    D-dimer 3.30 (H) 0.00 - 0.65 mg/L FEU   POC LACTIC ACID    Collection Time: 06/16/21  3:25 PM   Result Value Ref Range    Lactic Acid (POC) 1.07 0.40 - 2.00 mmol/L   URINALYSIS W/ REFLEX CULTURE    Collection Time: 06/16/21  4:12 PM    Specimen: Urine   Result Value Ref Range    Color YELLOW/STRAW      Appearance TURBID (A) CLEAR      Specific gravity 1.021 1.003 - 1.030      pH (UA) 5.5 5.0 - 8.0      Protein 30 (A) NEG mg/dL    Glucose Negative NEG mg/dL    Ketone Negative NEG mg/dL    Bilirubin Negative NEG      Blood MODERATE (A) NEG      Urobilinogen 0.2 0.2 - 1.0 EU/dL    Nitrites Negative NEG      Leukocyte Esterase Negative NEG      WBC 0-4 0 - 4 /hpf    RBC 0-5 0 - 5 /hpf    Epithelial cells FEW FEW /lpf    Bacteria Negative NEG /hpf    UA:UC IF INDICATED CULTURE NOT INDICATED BY UA RESULT CNI      Hyaline cast 2-5 0 - 5 /lpf   LIPASE    Collection Time: 06/17/21  3:33 AM   Result Value Ref Range    Lipase 70 (L) 73 - 393 U/L   AMYLASE    Collection Time: 06/17/21  3:33 AM   Result Value Ref Range    Amylase 15 (L) 25 - 115 U/L   CBC WITH AUTOMATED DIFF    Collection Time: 06/17/21  3:33 AM   Result Value Ref Range    WBC 12.4 (H) 3.6 - 11.0 K/uL    RBC 3.87 3.80 - 5.20 M/uL    HGB 10.4 (L) 11.5 - 16.0 g/dL    HCT 33.6 (L) 35.0 - 47.0 %    MCV 86.8 80.0 - 99.0 FL    MCH 26.9 26.0 - 34.0 PG    MCHC 31.0 30.0 - 36.5 g/dL    RDW 21.6 (H) 11.5 - 14.5 %    PLATELET 486 244 - 430 K/uL    MPV 10.4 8.9 - 12.9 FL    NRBC 0.0 0  WBC    ABSOLUTE NRBC 0.00 0.00 - 0.01 K/uL    NEUTROPHILS 92 (H) 32 - 75 %    LYMPHOCYTES 3 (L) 12 - 49 %    MONOCYTES 3 (L) 5 - 13 %    EOSINOPHILS 0 0 - 7 %    BASOPHILS 0 0 - 1 %    IMMATURE GRANULOCYTES 2 (H) 0.0 - 0.5 %    ABS. NEUTROPHILS 11.4 (H) 1.8 - 8.0 K/UL    ABS. LYMPHOCYTES 0.4 (L) 0.8 - 3.5 K/UL    ABS. MONOCYTES 0.4 0.0 - 1.0 K/UL    ABS. EOSINOPHILS 0.0 0.0 - 0.4 K/UL    ABS. BASOPHILS 0.0 0.0 - 0.1 K/UL    ABS. IMM. GRANS. 0.2 (H) 0.00 - 0.04 K/UL    DF SMEAR SCANNED      RBC COMMENTS ANISOCYTOSIS  2+       METABOLIC PANEL, COMPREHENSIVE    Collection Time: 06/17/21  3:33 AM   Result Value Ref Range    Sodium 135 (L) 136 - 145 mmol/L    Potassium 3.1 (L) 3.5 - 5.1 mmol/L    Chloride 103 97 - 108 mmol/L    CO2 26 21 - 32 mmol/L    Anion gap 6 5 - 15 mmol/L    Glucose 106 (H) 65 - 100 mg/dL    BUN 26 (H) 6 - 20 MG/DL    Creatinine 1.34 (H) 0.55 - 1.02 MG/DL    BUN/Creatinine ratio 19 12 - 20      GFR est AA 49 (L) >60 ml/min/1.73m2    GFR est non-AA 40 (L) >60 ml/min/1.73m2    Calcium 7.9 (L) 8.5 - 10.1 MG/DL    Bilirubin, total 0.6 0.2 - 1.0 MG/DL    ALT (SGPT) 54 12 - 78 U/L    AST (SGOT) 43 (H) 15 - 37 U/L    Alk.  phosphatase 91 45 - 117 U/L    Protein, total 7.0 6.4 - 8.2 g/dL    Albumin 2.5 (L) 3.5 - 5.0 g/dL    Globulin 4.5 (H) 2.0 - 4.0 g/dL    A-G Ratio 0.6 (L) 1.1 - 2.2     MAGNESIUM    Collection Time: 06/17/21  3:33 AM   Result Value Ref Range    Magnesium 2.0 1.6 - 2.4 mg/dL   PHOSPHORUS    Collection Time: 06/17/21  3:33 AM   Result Value Ref Range    Phosphorus 3.0 2.6 - 4.7 MG/DL        Assessment:     Assessment:         Hospital Problems  Date Reviewed: 8/21/2020        Codes Class Noted POA    Nausea & vomiting ICD-10-CM: R11.2  ICD-9-CM: 787.01  6/17/2021 Unknown        Diarrhea ICD-10-CM: R19.7  ICD-9-CM: 787.91  6/17/2021 Unknown        Dehydration ICD-10-CM: E86.0  ICD-9-CM: 276.51  6/17/2021 Unknown        Acute kidney injury [facility]) ICD-10-CM: N17.9  ICD-9-CM: 584.9  6/17/2021 Unknown        Cholelithiasis ICD-10-CM: K80.20  ICD-9-CM: 574.20  6/17/2021 Unknown        Atrial fibrillation with RVR ([facility] [address] ICD-10-CM: I48.91  ICD-9-CM: 427.31  6/16/2021 Unknown               Plan:     Aflutter/afib- in the setting of acute illness. Duration unknown. Continue cardizem drip. Will add IV betablocker for rate control can use IV digoxin if BP becomes an issue. No anti coagulation due to acute ulcer, Jearld Gathers witness. Dr. Fran Kinsey will follow in AM. Thank you for the consult.       France Almonte MD

## 2021-06-17 NOTE — PROGRESS NOTES
Received message from patient's nurse Shari Herring stating :    K is 3.1. NS and 40 of K was started after this lab was drawn. Pt is NPO but can take meds with sips. Discussion / orders:    Kcl 20 meq x 1 po now         Please note that this note was dictated using Dragon computer voice recognition software. Quite often unanticipated grammatical, syntax, homophones, and other interpretive errors are inadvertently transcribed by the computer software. Please disregard these errors. Please excuse any errors that have escaped final proofreading.

## 2021-06-17 NOTE — PROGRESS NOTES
0700: Bedside shift change report given to Brendon Pugh [facility] (oncoming nurse) by Robinson Burgos, RN (offgoing nurse). Report included the following information SBAR and Kardex. 8135: Dr. Winsome Zhang consulted. Anderson Fan NP reviewing chart and at bedside to evaluate pt. Pt continues to be maxed out on dilt gtt. A-flutter. -140s. Pt appears slightly diaphoretic. Denies any complaints at this time. VSS otherwise.

## 2021-06-17 NOTE — CONSULTS
Consult Date: 2021    Consults  I am asked to consult on this 62 y.o. F for abdominal pain and fever    Subjective   She started having high fevers to 103.6F 5 days ago. She then started having diarrhea and RUQ pain. She was admitted with above and CT suggesting inflammatory changes in the RUQ thought possibly due to duodenitis and cholelithiasis was noted. She had a WBC of 18 k but down to 12 k this morning. She had an EGD/colonoscopy in 2019 which were relatively unrevealing. She is feeling somewhat better today. She denies melena, vomiting, hematochezia. She was found to be in atrial flutter on admission and still is and cardiology is planning intervention. Past Medical History:   Diagnosis Date    Cancer [facility])     cervical    GERD (gastroesophageal reflux disease)     Hypertension     Ill-defined condition     Fibromyalgia      Past Surgical History:   Procedure Laterality Date    COLONOSCOPY N/A 2019    COLONOSCOPY performed by Omar Oro [facility] at [facility] ENDOSCOPY    HX  SECTION      HX GYN      cone resection     Family History   Problem Relation Age of Onset    Hypertension Mother     Heart Disease Father     Other Sister         FMS    Pancreatic Cancer Maternal Grandfather 61      Social History     Tobacco Use    Smoking status: Never Smoker    Smokeless tobacco: Never Used   Substance Use Topics    Alcohol use:  Yes     Alcohol/week: 2.5 standard drinks     Types: 3 Cans of beer per week     Comment: socially       Current Facility-Administered Medications   Medication Dose Route Frequency Provider Last Rate Last Admin    gabapentin (NEURONTIN) capsule 100 mg  100 mg Oral TID Maxi Meehan MD   100 mg at 21 0800    sertraline (ZOLOFT) tablet 100 mg  100 mg Oral DAILY Talisha Meehan MD   100 mg at 21 0800    sodium chloride (NS) flush 5-40 mL  5-40 mL IntraVENous Q8H Mariaelena Gutiérrez MD   10 mL at 21 0616    sodium chloride (NS) flush 5-40 mL  5-40 mL IntraVENous PRN Aster Meehan MD        acetaminophen (TYLENOL) tablet 650 mg  650 mg Oral Q6H PRN Aster Meehan MD        Or   Day acetaminophen (TYLENOL) suppository 650 mg  650 mg Rectal Q6H PRN Aster Meehan MD        polyethylene glycol (MIRALAX) packet 17 g  17 g Oral DAILY PRN Aster Meehan MD        ondansetron (ZOFRAN ODT) tablet 4 mg  4 mg Oral Q8H PRN Aster Meehan MD        Or    ondansetron [facility]) injection 4 mg  4 mg IntraVENous Q6H PRN Aster Meehan MD        0.9% sodium chloride with KCl 40 mEq/L infusion   IntraVENous CONTINUOUS Walter Mims  mL/hr at 06/17/21 0340 New Bag at 06/17/21 0340    digoxin (LANOXIN) tablet 0.125 mg  0.125 mg Oral DAILY Maxi Meehan MD   0.125 mg at 06/17/21 0800    metoprolol (LOPRESSOR) injection 1.25 mg  1.25 mg IntraVENous ONCE Braeden Dale NP        dilTIAZem (CARDIZEM) 100 mg in dextrose 5% (MBP/ADV) 100 mL infusion  0-15 mg/hr IntraVENous TITRATE Walter Mims MD 15 mL/hr at 06/17/21 0615 15 mg/hr at 06/17/21 0615    acetaminophen (TYLENOL) tablet 650 mg  650 mg Oral Q6H PRN Walter Mims MD        ondansetron [facility]) injection 4 mg  4 mg IntraVENous Q4H PRN Aster Meehan MD        pantoprazole (PROTONIX) 40 mg in 0.9% sodium chloride 10 mL injection  40 mg IntraVENous Q12H Maxi Meehan MD   40 mg at 06/17/21 0801    cefepime (MAXIPIME) 2 g in 0.9% sodium chloride (MBP/ADV) 100 mL MBP  2 g IntraVENous Q12H Aster Meehan  mL/hr at 06/17/21 0759 2 g at 06/17/21 0759    metroNIDAZOLE (FLAGYL) IVPB premix 500 mg  500 mg IntraVENous Q8H Aster Meehan  mL/hr at 06/17/21 0620 500 mg at 06/17/21 7181    morphine injection 1 mg  1 mg IntraVENous Q4H PRN Walter Mims MD   1 mg at 06/16/21 2370        Review of Systems   Constitutional: Negative for activity change, appetite change, chills, fatigue and fever. HENT: Negative for congestion, sinus pressure, sore throat, trouble swallowing and voice change. Eyes: Negative for discharge, itching and visual disturbance. Respiratory: Negative for apnea, chest tightness, shortness of breath, wheezing and stridor. Cardiovascular: Negative for chest pain, palpitations and leg swelling. Gastrointestinal: Positive for abdominal pain, diarrhea and nausea. Negative for abdominal distention, anal bleeding, blood in stool, constipation and vomiting. Genitourinary: Negative for difficulty urinating, dysuria, flank pain, frequency and hematuria. Musculoskeletal: Negative for arthralgias, back pain, joint swelling, myalgias, neck pain and neck stiffness. Skin: Negative for color change, pallor and rash. Neurological: Negative for dizziness, seizures, facial asymmetry, speech difficulty, light-headedness, numbness and headaches. Hematological: Negative for adenopathy. Does not bruise/bleed easily. Psychiatric/Behavioral: Negative for agitation, behavioral problems and dysphoric mood. The patient is not nervous/anxious and is not hyperactive. Objective     Vital signs for last 24 hours:  Visit Vitals  /74   Pulse 97   Temp 98.8 °F (37.1 °C)   Resp 20   Ht 5' 6\" (1.676 m)   Wt 128.4 kg (283 lb)   SpO2 93%   BMI 45.68 kg/m²       Intake/Output this shift:  Current Shift: No intake/output data recorded.   Last 3 Shifts: 06/15 1901 - 06/17 0700  In: 4250 [I.V.:4250]  Out: -     Data Review:   Recent Results (from the past 24 hour(s))   COVID-19 RAPID TEST    Collection Time: 06/16/21 11:54 AM   Result Value Ref Range    Specimen source Nasopharyngeal      COVID-19 rapid test Not detected NOTD     BLOOD GAS,LACTIC ACID, POC    Collection Time: 06/16/21 12:02 PM   Result Value Ref Range    pH (POC) 7.46 (H) 7.35 - 7.45      pCO2 (POC) 31.8 (L) 35.0 - 45.0 MMHG    pO2 (POC) 40 (LL) 80 - 100 MMHG    HCO3 (POC) 22.8 22 - 26 MMOL/L    sO2 (POC) 78.8 (L) 92 - 97 %    Base deficit (POC) 0.2 mmol/L    Specimen type (POC) VENOUS BLOOD      Performed by Ángela Crenshaw RN     Lactic Acid (POC) 2.18 (HH) 0.40 - 2.00 mmol/L    Critical value read back Leonora Gonzales DIMER    Collection Time: 06/16/21  3:23 PM   Result Value Ref Range    D-dimer 3.30 (H) 0.00 - 0.65 mg/L FEU   POC LACTIC ACID    Collection Time: 06/16/21  3:25 PM   Result Value Ref Range    Lactic Acid (POC) 1.07 0.40 - 2.00 mmol/L   URINALYSIS W/ REFLEX CULTURE    Collection Time: 06/16/21  4:12 PM    Specimen: Urine   Result Value Ref Range    Color YELLOW/STRAW      Appearance TURBID (A) CLEAR      Specific gravity 1.021 1.003 - 1.030      pH (UA) 5.5 5.0 - 8.0      Protein 30 (A) NEG mg/dL    Glucose Negative NEG mg/dL    Ketone Negative NEG mg/dL    Bilirubin Negative NEG      Blood MODERATE (A) NEG      Urobilinogen 0.2 0.2 - 1.0 EU/dL    Nitrites Negative NEG      Leukocyte Esterase Negative NEG      WBC 0-4 0 - 4 /hpf    RBC 0-5 0 - 5 /hpf    Epithelial cells FEW FEW /lpf    Bacteria Negative NEG /hpf    UA:UC IF INDICATED CULTURE NOT INDICATED BY UA RESULT CNI      Hyaline cast 2-5 0 - 5 /lpf   LIPASE    Collection Time: 06/17/21  3:33 AM   Result Value Ref Range    Lipase 70 (L) 73 - 393 U/L   AMYLASE    Collection Time: 06/17/21  3:33 AM   Result Value Ref Range    Amylase 15 (L) 25 - 115 U/L   CBC WITH AUTOMATED DIFF    Collection Time: 06/17/21  3:33 AM   Result Value Ref Range    WBC 12.4 (H) 3.6 - 11.0 K/uL    RBC 3.87 3.80 - 5.20 M/uL    HGB 10.4 (L) 11.5 - 16.0 g/dL    HCT 33.6 (L) 35.0 - 47.0 %    MCV 86.8 80.0 - 99.0 FL    MCH 26.9 26.0 - 34.0 PG    MCHC 31.0 30.0 - 36.5 g/dL    RDW 21.6 (H) 11.5 - 14.5 %    PLATELET 458 955 - 472 K/uL    MPV 10.4 8.9 - 12.9 FL    NRBC 0.0 0  WBC    ABSOLUTE NRBC 0.00 0.00 - 0.01 K/uL    NEUTROPHILS 92 (H) 32 - 75 %    LYMPHOCYTES 3 (L) 12 - 49 %    MONOCYTES 3 (L) 5 - 13 %    EOSINOPHILS 0 0 - 7 %    BASOPHILS 0 0 - 1 % IMMATURE GRANULOCYTES 2 (H) 0.0 - 0.5 %    ABS. NEUTROPHILS 11.4 (H) 1.8 - 8.0 K/UL    ABS. LYMPHOCYTES 0.4 (L) 0.8 - 3.5 K/UL    ABS. MONOCYTES 0.4 0.0 - 1.0 K/UL    ABS. EOSINOPHILS 0.0 0.0 - 0.4 K/UL    ABS. BASOPHILS 0.0 0.0 - 0.1 K/UL    ABS. IMM. GRANS. 0.2 (H) 0.00 - 0.04 K/UL    DF SMEAR SCANNED      RBC COMMENTS ANISOCYTOSIS  2+       METABOLIC PANEL, COMPREHENSIVE    Collection Time: 06/17/21  3:33 AM   Result Value Ref Range    Sodium 135 (L) 136 - 145 mmol/L    Potassium 3.1 (L) 3.5 - 5.1 mmol/L    Chloride 103 97 - 108 mmol/L    CO2 26 21 - 32 mmol/L    Anion gap 6 5 - 15 mmol/L    Glucose 106 (H) 65 - 100 mg/dL    BUN 26 (H) 6 - 20 MG/DL    Creatinine 1.34 (H) 0.55 - 1.02 MG/DL    BUN/Creatinine ratio 19 12 - 20      GFR est AA 49 (L) >60 ml/min/1.73m2    GFR est non-AA 40 (L) >60 ml/min/1.73m2    Calcium 7.9 (L) 8.5 - 10.1 MG/DL    Bilirubin, total 0.6 0.2 - 1.0 MG/DL    ALT (SGPT) 54 12 - 78 U/L    AST (SGOT) 43 (H) 15 - 37 U/L    Alk. phosphatase 91 45 - 117 U/L    Protein, total 7.0 6.4 - 8.2 g/dL    Albumin 2.5 (L) 3.5 - 5.0 g/dL    Globulin 4.5 (H) 2.0 - 4.0 g/dL    A-G Ratio 0.6 (L) 1.1 - 2.2     MAGNESIUM    Collection Time: 06/17/21  3:33 AM   Result Value Ref Range    Magnesium 2.0 1.6 - 2.4 mg/dL   PHOSPHORUS    Collection Time: 06/17/21  3:33 AM   Result Value Ref Range    Phosphorus 3.0 2.6 - 4.7 MG/DL       Physical Exam  Constitutional:       General: She is not in acute distress. Appearance: She is well-developed. She is not diaphoretic. HENT:      Head: Normocephalic and atraumatic. Mouth/Throat:      Pharynx: No oropharyngeal exudate. Eyes:      General: No scleral icterus. Conjunctiva/sclera: Conjunctivae normal.      Pupils: Pupils are equal, round, and reactive to light. Neck:      Thyroid: No thyromegaly. Vascular: No JVD. Trachea: No tracheal deviation. Cardiovascular:      Rate and Rhythm: Tachycardia present. Rhythm irregularly irregular. Heart sounds: No murmur heard. No friction rub. No gallop. Pulmonary:      Effort: Pulmonary effort is normal. No respiratory distress. Breath sounds: Normal breath sounds. No wheezing or rales. Abdominal:      General: Bowel sounds are normal. There is no distension. Palpations: Abdomen is soft. There is no mass. Tenderness: There is abdominal tenderness in the right upper quadrant and epigastric area. There is no right CVA tenderness, left CVA tenderness, guarding or rebound. Negative signs include Del Toro's sign and Rovsing's sign. Musculoskeletal:         General: Normal range of motion. Cervical back: Normal range of motion and neck supple. Lymphadenopathy:      Cervical: No cervical adenopathy. Skin:     General: Skin is warm and dry. Coloration: Skin is not pale. Findings: No erythema or rash. Neurological:      Mental Status: She is alert and oriented to person, place, and time. Cranial Nerves: No cranial nerve deficit. Psychiatric:         Behavior: Behavior normal.         Thought Content: Thought content normal.         Judgment: Judgment normal.       I personally reviewed her CT images    IMPRESSION    1.  RUQ/epigastric pain and diarrhea. This is more concerning for colitis than duodenitis. There is no suggestion of perforation at this time. 2.  Cholelithiasis but this does not to be the source     3. Fever ?due to above or other viral process    4. Atrial flutter. On rx     PLAN:    1. Agree with hydration  2. Antibiotics pending cultures  3. Possible EGD/colonoscopy per GI  4.   Cardiology to manage a flutter ?cardioversion    Will follow     Kemi Guzman MD FACS

## 2021-06-17 NOTE — PROGRESS NOTES
Physical Therapy Screening:    [facility] based on results obtained during the nursing admission assessment. The patients chart was reviewed and the patient is appropriate for a skilled therapy evaluation if there is a decline in functional mobility from baseline. Please order a consult for physical therapy if you are in agreement and would like an evaluation to be completed. Thank you.     Nancy Cadet, PT

## 2021-06-17 NOTE — PROGRESS NOTES
06/16/21 2129   Vital Signs   Temp 99.1 °F (37.3 °C)   Temp Source Oral   Pulse (Heart Rate) (!) 131   Resp Rate 26   O2 Sat (%) 92 %   Level of Consciousness Alert (0)   /67   MAP (Calculated) 86   MEWS Score 5   HR elevated on  dilt drip

## 2021-06-17 NOTE — ED NOTES
TRANSFER - OUT REPORT:    Verbal report given to Alysha HAYS(name) on Vilma Naranjo  being transferred to [facility](unit) for routine progression of care       Report consisted of patients Situation, Background, Assessment and   Recommendations(SBAR). Information from the following report(s) SBAR, ED Summary, [facility] and Recent Results was reviewed with the receiving nurse. Lines:   Peripheral IV 06/16/21 Right Antecubital (Active)       Peripheral IV 06/16/21 Left Hand (Active)       Peripheral IV 06/16/21 Antecubital (Active)   Site Assessment Clean, dry, & intact 06/16/21 1919   Phlebitis Assessment 0 06/16/21 1919   Infiltration Assessment 0 06/16/21 1919   Dressing Status Clean, dry, & intact 06/16/21 1919   Dressing Type Transparent 06/16/21 1919   Hub Color/Line Status Pink;Patent; Flushed 06/16/21 1919        Opportunity for questions and clarification was provided.       Patient transported with:   Monitor  Registered Nurse

## 2021-06-18 ENCOUNTER — APPOINTMENT (OUTPATIENT)
Dept: NUCLEAR MEDICINE | Age: 61
DRG: 871 | End: 2021-06-18
Attending: PHYSICIAN ASSISTANT
Payer: MEDICARE

## 2021-06-18 ENCOUNTER — ANESTHESIA EVENT (OUTPATIENT)
Dept: SURGERY | Age: 61
DRG: 871 | End: 2021-06-18
Payer: MEDICARE

## 2021-06-18 ENCOUNTER — ANESTHESIA (OUTPATIENT)
Dept: SURGERY | Age: 61
DRG: 871 | End: 2021-06-18
Payer: MEDICARE

## 2021-06-18 PROBLEM — I49.9 ARRHYTHMIA: Status: ACTIVE | Noted: 2021-06-18

## 2021-06-18 PROBLEM — E78.5 DYSLIPIDEMIA: Status: ACTIVE | Noted: 2021-06-18

## 2021-06-18 PROBLEM — I10 HTN (HYPERTENSION): Status: ACTIVE | Noted: 2021-06-18

## 2021-06-18 LAB
ALBUMIN SERPL-MCNC: 2.2 G/DL (ref 3.5–5)
ALBUMIN/GLOB SERPL: 0.5 {RATIO} (ref 1.1–2.2)
ALP SERPL-CCNC: 103 U/L (ref 45–117)
ALT SERPL-CCNC: 49 U/L (ref 12–78)
ANION GAP SERPL CALC-SCNC: 5 MMOL/L (ref 5–15)
AST SERPL-CCNC: 54 U/L (ref 15–37)
BILIRUB SERPL-MCNC: 0.6 MG/DL (ref 0.2–1)
BUN SERPL-MCNC: 15 MG/DL (ref 6–20)
BUN/CREAT SERPL: 17 (ref 12–20)
CALCIUM SERPL-MCNC: 8.2 MG/DL (ref 8.5–10.1)
CHLORIDE SERPL-SCNC: 108 MMOL/L (ref 97–108)
CO2 SERPL-SCNC: 21 MMOL/L (ref 21–32)
CREAT SERPL-MCNC: 0.87 MG/DL (ref 0.55–1.02)
GLOBULIN SER CALC-MCNC: 4.4 G/DL (ref 2–4)
GLUCOSE SERPL-MCNC: 102 MG/DL (ref 65–100)
HGB BLD-MCNC: 10.6 G/DL (ref 11.5–16)
INR PPP: 1 (ref 0.9–1.1)
MAGNESIUM SERPL-MCNC: 2.3 MG/DL (ref 1.6–2.4)
POTASSIUM SERPL-SCNC: 4.3 MMOL/L (ref 3.5–5.1)
PROT SERPL-MCNC: 6.6 G/DL (ref 6.4–8.2)
PROTHROMBIN TIME: 10.4 SEC (ref 9–11.1)
SODIUM SERPL-SCNC: 134 MMOL/L (ref 136–145)

## 2021-06-18 PROCEDURE — 88305 TISSUE EXAM BY PATHOLOGIST: CPT

## 2021-06-18 PROCEDURE — 88342 IMHCHEM/IMCYTCHM 1ST ANTB: CPT

## 2021-06-18 PROCEDURE — 36415 COLL VENOUS BLD VENIPUNCTURE: CPT

## 2021-06-18 PROCEDURE — C9113 INJ PANTOPRAZOLE SODIUM, VIA: HCPCS | Performed by: GENERAL ACUTE CARE HOSPITAL

## 2021-06-18 PROCEDURE — 74011250637 HC RX REV CODE- 250/637: Performed by: NURSE PRACTITIONER

## 2021-06-18 PROCEDURE — 83735 ASSAY OF MAGNESIUM: CPT

## 2021-06-18 PROCEDURE — 77010033678 HC OXYGEN DAILY

## 2021-06-18 PROCEDURE — 85610 PROTHROMBIN TIME: CPT

## 2021-06-18 PROCEDURE — 74011000250 HC RX REV CODE- 250: Performed by: INTERNAL MEDICINE

## 2021-06-18 PROCEDURE — 74011000250 HC RX REV CODE- 250: Performed by: GENERAL ACUTE CARE HOSPITAL

## 2021-06-18 PROCEDURE — 74011000250 HC RX REV CODE- 250: Performed by: NURSE ANESTHETIST, CERTIFIED REGISTERED

## 2021-06-18 PROCEDURE — 74011250636 HC RX REV CODE- 250/636: Performed by: NURSE PRACTITIONER

## 2021-06-18 PROCEDURE — 99233 SBSQ HOSP IP/OBS HIGH 50: CPT | Performed by: INTERNAL MEDICINE

## 2021-06-18 PROCEDURE — 65660000001 HC RM ICU INTERMED STEPDOWN

## 2021-06-18 PROCEDURE — 74011000250 HC RX REV CODE- 250: Performed by: NURSE PRACTITIONER

## 2021-06-18 PROCEDURE — 74011250636 HC RX REV CODE- 250/636: Performed by: GENERAL ACUTE CARE HOSPITAL

## 2021-06-18 PROCEDURE — 74011000258 HC RX REV CODE- 258: Performed by: GENERAL ACUTE CARE HOSPITAL

## 2021-06-18 PROCEDURE — 77030027138 HC INCENT SPIROMETER -A

## 2021-06-18 PROCEDURE — 74011250637 HC RX REV CODE- 250/637: Performed by: GENERAL ACUTE CARE HOSPITAL

## 2021-06-18 PROCEDURE — 77030039825 HC MSK NSL PAP SUPERNO2VA VYRM -B: Performed by: NURSE ANESTHETIST, CERTIFIED REGISTERED

## 2021-06-18 PROCEDURE — 2709999900 HC NON-CHARGEABLE SUPPLY: Performed by: SPECIALIST

## 2021-06-18 PROCEDURE — 74011250636 HC RX REV CODE- 250/636: Performed by: SPECIALIST

## 2021-06-18 PROCEDURE — A9537 TC99M MEBROFENIN: HCPCS

## 2021-06-18 PROCEDURE — 74011250636 HC RX REV CODE- 250/636: Performed by: NURSE ANESTHETIST, CERTIFIED REGISTERED

## 2021-06-18 PROCEDURE — 80053 COMPREHEN METABOLIC PANEL: CPT

## 2021-06-18 PROCEDURE — 76210000016 HC OR PH I REC 1 TO 1.5 HR: Performed by: SPECIALIST

## 2021-06-18 PROCEDURE — 85018 HEMOGLOBIN: CPT

## 2021-06-18 PROCEDURE — 77030019988 HC FCPS ENDOSC DISP BSC -B: Performed by: SPECIALIST

## 2021-06-18 PROCEDURE — 0DB68ZX EXCISION OF STOMACH, VIA NATURAL OR ARTIFICIAL OPENING ENDOSCOPIC, DIAGNOSTIC: ICD-10-PCS | Performed by: SPECIALIST

## 2021-06-18 PROCEDURE — 77030040361 HC SLV COMPR DVT MDII -B: Performed by: SPECIALIST

## 2021-06-18 PROCEDURE — 76060000031 HC ANESTHESIA FIRST 0.5 HR: Performed by: SPECIALIST

## 2021-06-18 PROCEDURE — 76010000154 HC OR TIME FIRST 0.5 HR: Performed by: SPECIALIST

## 2021-06-18 PROCEDURE — 99233 SBSQ HOSP IP/OBS HIGH 50: CPT | Performed by: SURGERY

## 2021-06-18 RX ORDER — PANTOPRAZOLE SODIUM 40 MG/1
40 TABLET, DELAYED RELEASE ORAL
Status: DISCONTINUED | OUTPATIENT
Start: 2021-06-19 | End: 2021-06-22 | Stop reason: HOSPADM

## 2021-06-18 RX ORDER — FENTANYL CITRATE 50 UG/ML
25 INJECTION, SOLUTION INTRAMUSCULAR; INTRAVENOUS
Status: DISCONTINUED | OUTPATIENT
Start: 2021-06-18 | End: 2021-06-18 | Stop reason: HOSPADM

## 2021-06-18 RX ORDER — FENTANYL CITRATE 50 UG/ML
50 INJECTION, SOLUTION INTRAMUSCULAR; INTRAVENOUS AS NEEDED
Status: CANCELLED | OUTPATIENT
Start: 2021-06-18

## 2021-06-18 RX ORDER — SODIUM CHLORIDE, SODIUM LACTATE, POTASSIUM CHLORIDE, CALCIUM CHLORIDE 600; 310; 30; 20 MG/100ML; MG/100ML; MG/100ML; MG/100ML
25 INJECTION, SOLUTION INTRAVENOUS CONTINUOUS
Status: DISCONTINUED | OUTPATIENT
Start: 2021-06-18 | End: 2021-06-18 | Stop reason: HOSPADM

## 2021-06-18 RX ORDER — LIDOCAINE HYDROCHLORIDE 10 MG/ML
0.1 INJECTION, SOLUTION EPIDURAL; INFILTRATION; INTRACAUDAL; PERINEURAL AS NEEDED
Status: CANCELLED | OUTPATIENT
Start: 2021-06-18

## 2021-06-18 RX ORDER — MIDAZOLAM HYDROCHLORIDE 1 MG/ML
1 INJECTION, SOLUTION INTRAMUSCULAR; INTRAVENOUS AS NEEDED
Status: CANCELLED | OUTPATIENT
Start: 2021-06-18

## 2021-06-18 RX ORDER — DILTIAZEM HYDROCHLORIDE 60 MG/1
60 TABLET, FILM COATED ORAL 4 TIMES DAILY
Status: DISCONTINUED | OUTPATIENT
Start: 2021-06-18 | End: 2021-06-19

## 2021-06-18 RX ORDER — KIT FOR THE PREPARATION OF TECHNETIUM TC 99M MEBROFENIN 45 MG/10ML
5 INJECTION, POWDER, LYOPHILIZED, FOR SOLUTION INTRAVENOUS
Status: COMPLETED | OUTPATIENT
Start: 2021-06-18 | End: 2021-06-18

## 2021-06-18 RX ORDER — METOPROLOL TARTRATE 25 MG/1
12.5 TABLET, FILM COATED ORAL EVERY 12 HOURS
Status: DISCONTINUED | OUTPATIENT
Start: 2021-06-18 | End: 2021-06-19

## 2021-06-18 RX ORDER — SODIUM CHLORIDE 0.9 % (FLUSH) 0.9 %
5-40 SYRINGE (ML) INJECTION EVERY 8 HOURS
Status: DISCONTINUED | OUTPATIENT
Start: 2021-06-18 | End: 2021-06-22 | Stop reason: HOSPADM

## 2021-06-18 RX ORDER — DEXTROMETHORPHAN/PSEUDOEPHED 2.5-7.5/.8
1.2 DROPS ORAL
Status: CANCELLED | OUTPATIENT
Start: 2021-06-18

## 2021-06-18 RX ORDER — HYDROMORPHONE HYDROCHLORIDE 1 MG/ML
.2-.5 INJECTION, SOLUTION INTRAMUSCULAR; INTRAVENOUS; SUBCUTANEOUS
Status: DISCONTINUED | OUTPATIENT
Start: 2021-06-18 | End: 2021-06-18 | Stop reason: HOSPADM

## 2021-06-18 RX ORDER — KETAMINE HYDROCHLORIDE 10 MG/ML
INJECTION, SOLUTION INTRAMUSCULAR; INTRAVENOUS AS NEEDED
Status: DISCONTINUED | OUTPATIENT
Start: 2021-06-18 | End: 2021-06-18 | Stop reason: HOSPADM

## 2021-06-18 RX ORDER — SODIUM CHLORIDE, SODIUM LACTATE, POTASSIUM CHLORIDE, CALCIUM CHLORIDE 600; 310; 30; 20 MG/100ML; MG/100ML; MG/100ML; MG/100ML
25 INJECTION, SOLUTION INTRAVENOUS CONTINUOUS
Status: CANCELLED | OUTPATIENT
Start: 2021-06-18 | End: 2021-06-19

## 2021-06-18 RX ORDER — ONDANSETRON 2 MG/ML
4 INJECTION INTRAMUSCULAR; INTRAVENOUS AS NEEDED
Status: DISCONTINUED | OUTPATIENT
Start: 2021-06-18 | End: 2021-06-18 | Stop reason: HOSPADM

## 2021-06-18 RX ORDER — SODIUM CHLORIDE, SODIUM LACTATE, POTASSIUM CHLORIDE, CALCIUM CHLORIDE 600; 310; 30; 20 MG/100ML; MG/100ML; MG/100ML; MG/100ML
INJECTION, SOLUTION INTRAVENOUS
Status: DISCONTINUED | OUTPATIENT
Start: 2021-06-18 | End: 2021-06-18 | Stop reason: HOSPADM

## 2021-06-18 RX ORDER — SODIUM CHLORIDE 0.9 % (FLUSH) 0.9 %
5-40 SYRINGE (ML) INJECTION AS NEEDED
Status: DISCONTINUED | OUTPATIENT
Start: 2021-06-18 | End: 2021-06-22 | Stop reason: HOSPADM

## 2021-06-18 RX ORDER — PROPOFOL 10 MG/ML
INJECTION, EMULSION INTRAVENOUS AS NEEDED
Status: DISCONTINUED | OUTPATIENT
Start: 2021-06-18 | End: 2021-06-18 | Stop reason: HOSPADM

## 2021-06-18 RX ORDER — PHENYLEPHRINE HCL IN 0.9% NACL 0.4MG/10ML
SYRINGE (ML) INTRAVENOUS AS NEEDED
Status: DISCONTINUED | OUTPATIENT
Start: 2021-06-18 | End: 2021-06-18 | Stop reason: HOSPADM

## 2021-06-18 RX ORDER — SODIUM CHLORIDE 9 MG/ML
50 INJECTION, SOLUTION INTRAVENOUS CONTINUOUS
Status: DISCONTINUED | OUTPATIENT
Start: 2021-06-18 | End: 2021-06-18

## 2021-06-18 RX ORDER — LIDOCAINE HYDROCHLORIDE 20 MG/ML
INJECTION, SOLUTION EPIDURAL; INFILTRATION; INTRACAUDAL; PERINEURAL AS NEEDED
Status: DISCONTINUED | OUTPATIENT
Start: 2021-06-18 | End: 2021-06-18 | Stop reason: HOSPADM

## 2021-06-18 RX ADMIN — Medication 10 ML: at 21:30

## 2021-06-18 RX ADMIN — CEFEPIME HYDROCHLORIDE 2 G: 2 INJECTION, POWDER, FOR SOLUTION INTRAVENOUS at 12:34

## 2021-06-18 RX ADMIN — METOPROLOL TARTRATE 12.5 MG: 25 TABLET, FILM COATED ORAL at 21:17

## 2021-06-18 RX ADMIN — SERTRALINE 100 MG: 50 TABLET, FILM COATED ORAL at 09:39

## 2021-06-18 RX ADMIN — METOPROLOL TARTRATE 12.5 MG: 25 TABLET, FILM COATED ORAL at 12:34

## 2021-06-18 RX ADMIN — LIDOCAINE HYDROCHLORIDE 100 MG: 20 INJECTION, SOLUTION INTRAVENOUS at 15:31

## 2021-06-18 RX ADMIN — DILTIAZEM HYDROCHLORIDE 60 MG: 60 TABLET, FILM COATED ORAL at 19:02

## 2021-06-18 RX ADMIN — DEXTROSE MONOHYDRATE 15 MG/HR: 50 INJECTION, SOLUTION INTRAVENOUS at 03:49

## 2021-06-18 RX ADMIN — SODIUM CHLORIDE 50 ML/HR: 900 INJECTION, SOLUTION INTRAVENOUS at 17:00

## 2021-06-18 RX ADMIN — Medication 80 MCG: at 15:25

## 2021-06-18 RX ADMIN — METOPROLOL TARTRATE 1.25 MG: 5 INJECTION INTRAVENOUS at 05:22

## 2021-06-18 RX ADMIN — DILTIAZEM HYDROCHLORIDE 60 MG: 60 TABLET, FILM COATED ORAL at 12:34

## 2021-06-18 RX ADMIN — GABAPENTIN 100 MG: 100 CAPSULE ORAL at 09:39

## 2021-06-18 RX ADMIN — PROPOFOL 20 MG: 10 INJECTION, EMULSION INTRAVENOUS at 15:32

## 2021-06-18 RX ADMIN — DEXTROSE MONOHYDRATE 15 MG/HR: 50 INJECTION, SOLUTION INTRAVENOUS at 16:20

## 2021-06-18 RX ADMIN — GABAPENTIN 100 MG: 100 CAPSULE ORAL at 19:02

## 2021-06-18 RX ADMIN — SODIUM CHLORIDE 40 MG: 9 INJECTION INTRAMUSCULAR; INTRAVENOUS; SUBCUTANEOUS at 09:40

## 2021-06-18 RX ADMIN — SODIUM CHLORIDE, POTASSIUM CHLORIDE, SODIUM LACTATE AND CALCIUM CHLORIDE: 600; 310; 30; 20 INJECTION, SOLUTION INTRAVENOUS at 14:25

## 2021-06-18 RX ADMIN — KIT FOR THE PREPARATION OF TECHNETIUM TC 99M MEBROFENIN 5 MILLICURIE: 45 INJECTION, POWDER, LYOPHILIZED, FOR SOLUTION INTRAVENOUS at 08:30

## 2021-06-18 RX ADMIN — Medication 10 ML: at 05:22

## 2021-06-18 RX ADMIN — METRONIDAZOLE 500 MG: 500 INJECTION, SOLUTION INTRAVENOUS at 09:40

## 2021-06-18 RX ADMIN — METRONIDAZOLE 500 MG: 500 INJECTION, SOLUTION INTRAVENOUS at 15:22

## 2021-06-18 RX ADMIN — DILTIAZEM HYDROCHLORIDE 60 MG: 60 TABLET, FILM COATED ORAL at 21:17

## 2021-06-18 RX ADMIN — KETAMINE HYDROCHLORIDE 20 MG: 10 INJECTION, SOLUTION INTRAMUSCULAR; INTRAVENOUS at 15:31

## 2021-06-18 RX ADMIN — DEXTROSE MONOHYDRATE 15 MG/HR: 50 INJECTION, SOLUTION INTRAVENOUS at 09:40

## 2021-06-18 RX ADMIN — DIGOXIN 0.12 MG: 125 TABLET ORAL at 09:39

## 2021-06-18 RX ADMIN — METRONIDAZOLE 500 MG: 500 INJECTION, SOLUTION INTRAVENOUS at 23:22

## 2021-06-18 RX ADMIN — GABAPENTIN 100 MG: 100 CAPSULE ORAL at 21:18

## 2021-06-18 RX ADMIN — CEFEPIME HYDROCHLORIDE 2 G: 2 INJECTION, POWDER, FOR SOLUTION INTRAVENOUS at 21:18

## 2021-06-18 RX ADMIN — PROPOFOL 20 MG: 10 INJECTION, EMULSION INTRAVENOUS at 15:31

## 2021-06-18 NOTE — PROGRESS NOTES
Admit Date: 2021    POD * No surgery found *    Procedure:  * No surgery found *      Assessment:   Active Problems:    Atrial fibrillation with RVR ([address] (2021)      Nausea & vomiting (2021)      Diarrhea (2021)      Dehydration (2021)      Acute kidney injury ([address] (2021)      Cholelithiasis (2021)      1. RUQ/epigastic pain. ?duodenitis vs colitis. Somewhat improved but still sore. 2. She does not have an acute abdomen  3. Aflutter on dilt  4. Doubt gallbladder is source of symptoms  5. Confucianism      Plan/Recommendations/Medical Decision Makin. EGD? To assess duodenum as she would benefit from anticoagulation if no ulcer  2. ?cardioversion if not reverting to NSR soon  3. HIDA today    Subjective:     Patient has complaints of pain. Objective:     Blood pressure 113/81, pulse 85, temperature 98.7 °F (37.1 °C), resp. rate 18, height 5' 6\" (1.676 m), weight 142.1 kg (313 lb 4.4 oz), SpO2 95 %. Temp (24hrs), Av.4 °F (36.9 °C), Min:97.9 °F (36.6 °C), Max:99.4 °F (37.4 °C)      Physical Exam:  LUNG: clear to auscultation bilaterally, rhonchi R anterior, L anterior, HEART: irregularly irregular rhythm, ABDOMEN: soft, non-distended tender in epigastrium/RUQ without rebound. Bowel sounds normal. No masses,  no organomegaly, EXTREMITIES:  extremities normal, atraumatic, no cyanosis or edema    Labs:   Recent Results (from the past 48 hour(s))   EKG, 12 LEAD, INITIAL    Collection Time: 21 10:51 AM   Result Value Ref Range    Ventricular Rate 143 BPM    Atrial Rate 300 BPM    QRS Duration 80 ms    Q-T Interval 342 ms    QTC Calculation (Bezet) 527 ms    Calculated R Axis 44 degrees    Calculated T Axis 85 degrees    Diagnosis       Atrial flutter with variable AV block  Nonspecific ST and T wave abnormality  When compared with ECG of 2016 07:23,  Atrial flutter has replaced Sinus rhythm  Vent.  rate has increased BY  63 BPM    Confirmed by Gaudencio Cisneros ([id number]) on 6/16/2021 6:51:08 PM     CBC WITH AUTOMATED DIFF    Collection Time: 06/16/21 11:03 AM   Result Value Ref Range    WBC 18.6 (H) 3.6 - 11.0 K/uL    RBC 4.72 3.80 - 5.20 M/uL    HGB 12.8 11.5 - 16.0 g/dL    HCT 39.6 35.0 - 47.0 %    MCV 83.9 80.0 - 99.0 FL    MCH 27.1 26.0 - 34.0 PG    MCHC 32.3 30.0 - 36.5 g/dL    RDW 22.0 (H) 11.5 - 14.5 %    PLATELET 727 100 - 678 K/uL    MPV 9.7 8.9 - 12.9 FL    NRBC 0.0 0  WBC    ABSOLUTE NRBC 0.00 0.00 - 0.01 K/uL    NEUTROPHILS 95 (H) 32 - 75 %    LYMPHOCYTES 2 (L) 12 - 49 %    MONOCYTES 2 (L) 5 - 13 %    EOSINOPHILS 0 0 - 7 %    BASOPHILS 0 0 - 1 %    IMMATURE GRANULOCYTES 1 (H) 0.0 - 0.5 %    ABS. NEUTROPHILS 17.6 (H) 1.8 - 8.0 K/UL    ABS. LYMPHOCYTES 0.4 (L) 0.8 - 3.5 K/UL    ABS. MONOCYTES 0.4 0.0 - 1.0 K/UL    ABS. EOSINOPHILS 0.0 0.0 - 0.4 K/UL    ABS. BASOPHILS 0.0 0.0 - 0.1 K/UL    ABS. IMM. GRANS. 0.2 (H) 0.00 - 0.04 K/UL    DF SMEAR SCANNED      RBC COMMENTS ANISOCYTOSIS  2+       METABOLIC PANEL, COMPREHENSIVE    Collection Time: 06/16/21 11:03 AM   Result Value Ref Range    Sodium 134 (L) 136 - 145 mmol/L    Potassium 2.4 (LL) 3.5 - 5.1 mmol/L    Chloride 99 97 - 108 mmol/L    CO2 24 21 - 32 mmol/L    Anion gap 11 5 - 15 mmol/L    Glucose 143 (H) 65 - 100 mg/dL    BUN 33 (H) 6 - 20 MG/DL    Creatinine 2.05 (H) 0.55 - 1.02 MG/DL    BUN/Creatinine ratio 16 12 - 20      GFR est AA 30 (L) >60 ml/min/1.73m2    GFR est non-AA 25 (L) >60 ml/min/1.73m2    Calcium 8.7 8.5 - 10.1 MG/DL    Bilirubin, total 1.4 (H) 0.2 - 1.0 MG/DL    ALT (SGPT) 80 (H) 12 - 78 U/L    AST (SGOT) 64 (H) 15 - 37 U/L    Alk.  phosphatase 108 45 - 117 U/L    Protein, total 8.5 (H) 6.4 - 8.2 g/dL    Albumin 3.2 (L) 3.5 - 5.0 g/dL    Globulin 5.3 (H) 2.0 - 4.0 g/dL    A-G Ratio 0.6 (L) 1.1 - 2.2     TROPONIN I    Collection Time: 06/16/21 11:03 AM   Result Value Ref Range    Troponin-I, Qt. <0.05 <0.05 ng/mL   SAMPLES BEING HELD    Collection Time: 06/16/21 11:03 AM Result Value Ref Range    SAMPLES BEING HELD  SST, RED, BLUE     COMMENT        Add-on orders for these samples will be processed based on acceptable specimen integrity and analyte stability, which may vary by analyte.    MAGNESIUM    Collection Time: 06/16/21 11:03 AM   Result Value Ref Range    Magnesium 1.7 1.6 - 2.4 mg/dL   TSH 3RD GENERATION    Collection Time: 06/16/21 11:03 AM   Result Value Ref Range    TSH 3.53 0.36 - 3.74 uIU/mL   CULTURE, BLOOD, PAIRED    Collection Time: 06/16/21 11:03 AM    Specimen: Blood   Result Value Ref Range    Special Requests: NO SPECIAL REQUESTS      Culture result: NO GROWTH 2 DAYS     COVID-19 RAPID TEST    Collection Time: 06/16/21 11:54 AM   Result Value Ref Range    Specimen source Nasopharyngeal      COVID-19 rapid test Not detected NOTD     BLOOD GAS,LACTIC ACID, POC    Collection Time: 06/16/21 12:02 PM   Result Value Ref Range    pH (POC) 7.46 (H) 7.35 - 7.45      pCO2 (POC) 31.8 (L) 35.0 - 45.0 MMHG    pO2 (POC) 40 (LL) 80 - 100 MMHG    HCO3 (POC) 22.8 22 - 26 MMOL/L    sO2 (POC) 78.8 (L) 92 - 97 %    Base deficit (POC) 0.2 mmol/L    Specimen type (POC) VENOUS BLOOD      Performed by Wade Smith RN     Lactic Acid (POC) 2.18 (HH) 0.40 - 2.00 mmol/L    Critical value read back Rachel Jay DIMER    Collection Time: 06/16/21  3:23 PM   Result Value Ref Range    D-dimer 3.30 (H) 0.00 - 0.65 mg/L FEU   POC LACTIC ACID    Collection Time: 06/16/21  3:25 PM   Result Value Ref Range    Lactic Acid (POC) 1.07 0.40 - 2.00 mmol/L   URINALYSIS W/ REFLEX CULTURE    Collection Time: 06/16/21  4:12 PM    Specimen: Urine   Result Value Ref Range    Color YELLOW/STRAW      Appearance TURBID (A) CLEAR      Specific gravity 1.021 1.003 - 1.030      pH (UA) 5.5 5.0 - 8.0      Protein 30 (A) NEG mg/dL    Glucose Negative NEG mg/dL    Ketone Negative NEG mg/dL    Bilirubin Negative NEG      Blood MODERATE (A) NEG      Urobilinogen 0.2 0.2 - 1.0 EU/dL    Nitrites Negative NEG Leukocyte Esterase Negative NEG      WBC 0-4 0 - 4 /hpf    RBC 0-5 0 - 5 /hpf    Epithelial cells FEW FEW /lpf    Bacteria Negative NEG /hpf    UA:UC IF INDICATED CULTURE NOT INDICATED BY UA RESULT CNI      Hyaline cast 2-5 0 - 5 /lpf   LIPASE    Collection Time: 06/17/21  3:33 AM   Result Value Ref Range    Lipase 70 (L) 73 - 393 U/L   AMYLASE    Collection Time: 06/17/21  3:33 AM   Result Value Ref Range    Amylase 15 (L) 25 - 115 U/L   CBC WITH AUTOMATED DIFF    Collection Time: 06/17/21  3:33 AM   Result Value Ref Range    WBC 12.4 (H) 3.6 - 11.0 K/uL    RBC 3.87 3.80 - 5.20 M/uL    HGB 10.4 (L) 11.5 - 16.0 g/dL    HCT 33.6 (L) 35.0 - 47.0 %    MCV 86.8 80.0 - 99.0 FL    MCH 26.9 26.0 - 34.0 PG    MCHC 31.0 30.0 - 36.5 g/dL    RDW 21.6 (H) 11.5 - 14.5 %    PLATELET 077 424 - 068 K/uL    MPV 10.4 8.9 - 12.9 FL    NRBC 0.0 0  WBC    ABSOLUTE NRBC 0.00 0.00 - 0.01 K/uL    NEUTROPHILS 92 (H) 32 - 75 %    LYMPHOCYTES 3 (L) 12 - 49 %    MONOCYTES 3 (L) 5 - 13 %    EOSINOPHILS 0 0 - 7 %    BASOPHILS 0 0 - 1 %    IMMATURE GRANULOCYTES 2 (H) 0.0 - 0.5 %    ABS. NEUTROPHILS 11.4 (H) 1.8 - 8.0 K/UL    ABS. LYMPHOCYTES 0.4 (L) 0.8 - 3.5 K/UL    ABS. MONOCYTES 0.4 0.0 - 1.0 K/UL    ABS. EOSINOPHILS 0.0 0.0 - 0.4 K/UL    ABS. BASOPHILS 0.0 0.0 - 0.1 K/UL    ABS. IMM.  GRANS. 0.2 (H) 0.00 - 0.04 K/UL    DF SMEAR SCANNED      RBC COMMENTS ANISOCYTOSIS  2+       METABOLIC PANEL, COMPREHENSIVE    Collection Time: 06/17/21  3:33 AM   Result Value Ref Range    Sodium 135 (L) 136 - 145 mmol/L    Potassium 3.1 (L) 3.5 - 5.1 mmol/L    Chloride 103 97 - 108 mmol/L    CO2 26 21 - 32 mmol/L    Anion gap 6 5 - 15 mmol/L    Glucose 106 (H) 65 - 100 mg/dL    BUN 26 (H) 6 - 20 MG/DL    Creatinine 1.34 (H) 0.55 - 1.02 MG/DL    BUN/Creatinine ratio 19 12 - 20      GFR est AA 49 (L) >60 ml/min/1.73m2    GFR est non-AA 40 (L) >60 ml/min/1.73m2    Calcium 7.9 (L) 8.5 - 10.1 MG/DL    Bilirubin, total 0.6 0.2 - 1.0 MG/DL    ALT (SGPT) 54 12 - 78 U/L    AST (SGOT) 43 (H) 15 - 37 U/L    Alk. phosphatase 91 45 - 117 U/L    Protein, total 7.0 6.4 - 8.2 g/dL    Albumin 2.5 (L) 3.5 - 5.0 g/dL    Globulin 4.5 (H) 2.0 - 4.0 g/dL    A-G Ratio 0.6 (L) 1.1 - 2.2     MAGNESIUM    Collection Time: 06/17/21  3:33 AM   Result Value Ref Range    Magnesium 2.0 1.6 - 2.4 mg/dL   PHOSPHORUS    Collection Time: 06/17/21  3:33 AM   Result Value Ref Range    Phosphorus 3.0 2.6 - 4.7 MG/DL   METABOLIC PANEL, COMPREHENSIVE    Collection Time: 06/18/21  1:42 AM   Result Value Ref Range    Sodium 134 (L) 136 - 145 mmol/L    Potassium 4.3 3.5 - 5.1 mmol/L    Chloride 108 97 - 108 mmol/L    CO2 21 21 - 32 mmol/L    Anion gap 5 5 - 15 mmol/L    Glucose 102 (H) 65 - 100 mg/dL    BUN 15 6 - 20 MG/DL    Creatinine 0.87 0.55 - 1.02 MG/DL    BUN/Creatinine ratio 17 12 - 20      GFR est AA >60 >60 ml/min/1.73m2    GFR est non-AA >60 >60 ml/min/1.73m2    Calcium 8.2 (L) 8.5 - 10.1 MG/DL    Bilirubin, total 0.6 0.2 - 1.0 MG/DL    ALT (SGPT) 49 12 - 78 U/L    AST (SGOT) 54 (H) 15 - 37 U/L    Alk.  phosphatase 103 45 - 117 U/L    Protein, total 6.6 6.4 - 8.2 g/dL    Albumin 2.2 (L) 3.5 - 5.0 g/dL    Globulin 4.4 (H) 2.0 - 4.0 g/dL    A-G Ratio 0.5 (L) 1.1 - 2.2     MAGNESIUM    Collection Time: 06/18/21  1:42 AM   Result Value Ref Range    Magnesium 2.3 1.6 - 2.4 mg/dL   PROTHROMBIN TIME + INR    Collection Time: 06/18/21  1:42 AM   Result Value Ref Range    INR 1.0 0.9 - 1.1      Prothrombin time 10.4 9.0 - 11.1 sec       Data Review images and reports reviewed

## 2021-06-18 NOTE — PROGRESS NOTES
NUC MED: Pt sent for [place] Gallbladder study. [facility], RN aware. Please keep pt NPO. No morphine based meds, please.

## 2021-06-18 NOTE — PERIOP NOTES
TRANSFER - IN REPORT:    Verbal report received volodymyr on Deanna Lloyd  being received fromor for routine post - op      Report consisted of patients Situation, Background, Assessment and   Recommendations(SBAR). Information from the following report(s) OR Summary was reviewed with the receiving nurse. Opportunity for questions and clarification was provided. Assessment completed upon patients arrival to unit and care assumed.

## 2021-06-18 NOTE — PROGRESS NOTES
[address]  [phone]      Cardiology Progress Note      6/18/2021 9:14 AM    Admit Date: 6/16/2021    Admit Diagnosis:   Atrial fibrillation with RVR ([address] [I48.91]    Subjective:     Adam Boss has no c/o CP, SOB. GI work up ongoing, s/p neg HIDA scan. Remains in afib with rate 90-120bpm, on Dilt 15mg IV.   Adding PO Dilt today and will wean IV    Visit Vitals  /77   Pulse 76   Temp 98.7 °F (37.1 °C)   Resp 18   Ht 5' 6\" (1.676 m)   Wt 313 lb 4.4 oz (142.1 kg)   SpO2 95%   BMI 50.56 kg/m²       Current Facility-Administered Medications   Medication Dose Route Frequency    dilTIAZem IR (CARDIZEM) tablet 60 mg  60 mg Oral QID    metoprolol tartrate (LOPRESSOR) tablet 12.5 mg  12.5 mg Oral Q12H    gabapentin (NEURONTIN) capsule 100 mg  100 mg Oral TID    sertraline (ZOLOFT) tablet 100 mg  100 mg Oral DAILY    sodium chloride (NS) flush 5-40 mL  5-40 mL IntraVENous Q8H    sodium chloride (NS) flush 5-40 mL  5-40 mL IntraVENous PRN    acetaminophen (TYLENOL) tablet 650 mg  650 mg Oral Q6H PRN    Or    acetaminophen (TYLENOL) suppository 650 mg  650 mg Rectal Q6H PRN    polyethylene glycol (MIRALAX) packet 17 g  17 g Oral DAILY PRN    ondansetron (ZOFRAN ODT) tablet 4 mg  4 mg Oral Q8H PRN    Or    ondansetron (ZOFRAN) injection 4 mg  4 mg IntraVENous Q6H PRN    digoxin (LANOXIN) tablet 0.125 mg  0.125 mg Oral DAILY    dilTIAZem (CARDIZEM) 100 mg in dextrose 5% (MBP/ADV) 100 mL infusion  0-15 mg/hr IntraVENous TITRATE    acetaminophen (TYLENOL) tablet 650 mg  650 mg Oral Q6H PRN    ondansetron (ZOFRAN) injection 4 mg  4 mg IntraVENous Q4H PRN    pantoprazole (PROTONIX) 40 mg in 0.9% sodium chloride 10 mL injection  40 mg IntraVENous Q12H    cefepime (MAXIPIME) 2 g in 0.9% sodium chloride (MBP/ADV) 100 mL MBP  2 g IntraVENous Q12H    metroNIDAZOLE (FLAGYL) IVPB premix 500 mg  500 mg IntraVENous Q8H    morphine injection 1 mg  1 mg IntraVENous Q4H PRN Objective:      Physical Exam:  General Appearance:  morbidly obese  female in no acute distress  Chest:   Clear  Cardiovascular:  irr, irr no murmur. Abdomen:   Soft, non-tender, bowel sounds are active. Extremities: no peripheral edema  Skin:  Warm and dry. Data Review:   Recent Labs     06/17/21  0333 06/16/21  1103   WBC 12.4* 18.6*   HGB 10.4* 12.8   HCT 33.6* 39.6    216     Recent Labs     06/18/21  0142 06/17/21  0333 06/16/21  1103   * 135* 134*   K 4.3 3.1* 2.4*    103 99   CO2 21 26 24   * 106* 143*   BUN 15 26* 33*   CREA 0.87 1.34* 2.05*   CA 8.2* 7.9* 8.7   MG 2.3 2.0 1.7   PHOS  --  3.0  --    ALB 2.2* 2.5* 3.2*   TBILI 0.6 0.6 1.4*   ALT 49 54 80*   INR 1.0  --   --        Recent Labs     06/16/21  1103   TROIQ <0.05         Intake/Output Summary (Last 24 hours) at 6/18/2021 1003  Last data filed at 6/18/2021 0746  Gross per 24 hour   Intake 3655.83 ml   Output 1700 ml   Net 1955.83 ml        Telemetry: afib       Assessment:     Active Problems:    Obesity, morbid ([address] (5/16/2018)      Atrial fibrillation with RVR (HCC) (6/16/2021)      Nausea & vomiting (6/17/2021)      Diarrhea (6/17/2021)      Dehydration (6/17/2021)      Acute kidney injury ([address] (6/17/2021)      Cholelithiasis (6/17/2021)      Arrhythmia (6/18/2021)      HTN (hypertension) (6/18/2021)      Dyslipidemia (6/18/2021)        Plan:     Aflutter/afib:  No previous hx of afib/flutter, likely r/t acute illness  Patient unaware of arrhythmia, feels no palpitations or other symptoms, so not sure how long she has been in fib  Rate better controlled. Continues on IV Dilt 15mg. Starting Dilt 60mg PO Q6H - wean IV dilt off as HR and BP tolerates, keeping HR < 100. Change IV BB to PO metoprolol 12.5mgBID  Echo pending  CHADS2 vasc score: 2. Recommend long term DOAC. At this time unable to start and anticoagulation due to possible GIB. Plan to start once GI evaluation completed and OK by GI. As a [address] witness, she does not accept blood products   Echo still pending         [facility]  Cardiology    Patient seen and examined by me with the above nurse practitioner. I personally performed all components of the history, physical, and medical decision making and agree with the assessment and plan with minor modifications as noted. Today the patient is in rate controlled atrial fibrillation. General PE  Gen:  NAD  Mental Status - Alert. General Appearance - Not in acute distress. HEENT:  PERRL, no carotid bruits or JVD  Chest and Lung Exam   Inspection: Accessory muscles - No use of accessory muscles in breathing. Auscultation:   Breath sounds: - Normal.   Cardiovascular   Inspection: Jugular vein - Bilateral - Inspection Normal.   Palpation/Percussion:   Apical Impulse: - Normal.   Auscultation: Rhythm -irregular. Heart Sounds - S1 WNL and S2 WNL. No S3 or S4. Murmurs & Other Heart Sounds: Auscultation of the heart reveals - No Murmurs. Peripheral Vascular   Upper Extremity: Inspection - Bilateral - No Cyanotic nailbeds or Digital clubbing. Lower Extremity:   Palpation: Edema - Bilateral - No edema. Abdomen:   Soft, non-tender, bowel sounds are active. Neuro: A&O times 3, CN and motor grossly WNL    Convert IV to p.o. diltiazem. GI evaluation underway.

## 2021-06-18 NOTE — PROGRESS NOTES
1900: Bedside shift change report given to Andrae Levi (oncoming nurse) by Charisma Peña RN (offgoing nurse). Report included the following information SBAR, ED Summary, Intake/Output, MAR, Recent Results and Cardiac Rhythm a flutter . 0700; End of Shift Note    Bedside shift change report given to Lolly Lacy (oncoming nurse) by Kathie Kessler RN (offgoing nurse). Report included the following information SBAR, ED Summary, Intake/Output, MAR, Recent Results and Cardiac Rhythm  atrial flutter    Shift worked:  night      Shift summary and any significant changes:     pt remained in a flutter, no changes to dilt drip. HR only climbed to the 100s when ambulating to the bedside commode, but did not sustain. Concerns for physician to address:  see above      Zone phone for oncoming shift:          Activity:  Activity Level: Up with Assistance  Number times ambulated in hallways past shift: 0  Number of times OOB to chair past shift: 0    Cardiac:   Cardiac Monitoring: Yes      Cardiac Rhythm: A flutter    Access:   Current line(s): PIV     Genitourinary:   Urinary status: voiding    Respiratory:   O2 Device: Nasal cannula  Chronic home O2 use?: YES  Incentive spirometer at bedside: YES     GI:  Last Bowel Movement Date: 06/17/21  Current diet:  DIET NPO  DIET ONE TIME MESSAGE  Passing flatus: YES  Tolerating current diet: YES       Pain Management:   Patient states pain is manageable on current regimen: YES    Skin:  Brandon Score: 18  Interventions: speciality bed and increase time out of bed    Patient Safety:  Fall Score:  Total Score: 3  Interventions: bed/chair alarm, assistive device (walker, cane, etc), gripper socks and pt to call before getting OOB  High Fall Risk: Yes    Length of Stay:  Expected LOS: 4d 19h  Actual LOS: 2      Kathie Kessler, RN

## 2021-06-18 NOTE — PROGRESS NOTES
Gastroenterology Daily Progress Note   (Cyn Pearson PA-C for Dr. Kwesi Alvarez)   [address] [place] Date: 6/16/2021       Subjective:     Patient is feeling a little better today, no pain now with abx.   Afebrile.      hgb stable yesterday, not repeated today    HIDA scan normal today, pt slept though study    HR much improved today     Rapid COVID neg on 4/16    Current Facility-Administered Medications   Medication Dose Route Frequency    gabapentin (NEURONTIN) capsule 100 mg  100 mg Oral TID    sertraline (ZOLOFT) tablet 100 mg  100 mg Oral DAILY    sodium chloride (NS) flush 5-40 mL  5-40 mL IntraVENous Q8H    sodium chloride (NS) flush 5-40 mL  5-40 mL IntraVENous PRN    acetaminophen (TYLENOL) tablet 650 mg  650 mg Oral Q6H PRN    Or    acetaminophen (TYLENOL) suppository 650 mg  650 mg Rectal Q6H PRN    polyethylene glycol (MIRALAX) packet 17 g  17 g Oral DAILY PRN    ondansetron (ZOFRAN ODT) tablet 4 mg  4 mg Oral Q8H PRN    Or    ondansetron (ZOFRAN) injection 4 mg  4 mg IntraVENous Q6H PRN    digoxin (LANOXIN) tablet 0.125 mg  0.125 mg Oral DAILY    metoprolol (LOPRESSOR) injection 1.25 mg  1.25 mg IntraVENous Q6H    dilTIAZem (CARDIZEM) 100 mg in dextrose 5% (MBP/ADV) 100 mL infusion  0-15 mg/hr IntraVENous TITRATE    acetaminophen (TYLENOL) tablet 650 mg  650 mg Oral Q6H PRN    ondansetron (ZOFRAN) injection 4 mg  4 mg IntraVENous Q4H PRN    pantoprazole (PROTONIX) 40 mg in 0.9% sodium chloride 10 mL injection  40 mg IntraVENous Q12H    cefepime (MAXIPIME) 2 g in 0.9% sodium chloride (MBP/ADV) 100 mL MBP  2 g IntraVENous Q12H    metroNIDAZOLE (FLAGYL) IVPB premix 500 mg  500 mg IntraVENous Q8H    morphine injection 1 mg  1 mg IntraVENous Q4H PRN        Objective:     Visit Vitals  /81   Pulse 85   Temp 98.7 °F (37.1 °C)   Resp 18   Ht 5' 6\" (1.676 m)   Wt 142.1 kg (313 lb 4.4 oz)   SpO2 95%   BMI 50.56 kg/m²   Blood pressure 113/81, pulse 85, temperature 98.7 °F (37.1 °C), resp. rate 18, height 5' 6\" (1.676 m), weight 142.1 kg (313 lb 4.4 oz), SpO2 95 %.    06/18 0701 - 06/18 1900  In: -   Out: 400 [Urine:400]    06/16 1901 - 06/18 0700  In: 3655.8 [P.O.:240; I.V.:3415.8]  Out: 1300 [Urine:1300]      Intake/Output Summary (Last 24 hours) at 6/18/2021 0817  Last data filed at 6/18/2021 0746  Gross per 24 hour   Intake 3655.83 ml   Output 1700 ml   Net 1955.83 ml         Physical Exam:       General: Obese WF, NAD on NC O2  Chest:  CTA, slightly increased WOB  Heart: S1, S2, RRR  GI: Soft, NT, ND + bowel sounds  Extremities: No edema  CNS: CN II-XII normal.      Labs:       Recent Results (from the past 24 hour(s))   METABOLIC PANEL, COMPREHENSIVE    Collection Time: 06/18/21  1:42 AM   Result Value Ref Range    Sodium 134 (L) 136 - 145 mmol/L    Potassium 4.3 3.5 - 5.1 mmol/L    Chloride 108 97 - 108 mmol/L    CO2 21 21 - 32 mmol/L    Anion gap 5 5 - 15 mmol/L    Glucose 102 (H) 65 - 100 mg/dL    BUN 15 6 - 20 MG/DL    Creatinine 0.87 0.55 - 1.02 MG/DL    BUN/Creatinine ratio 17 12 - 20      GFR est AA >60 >60 ml/min/1.73m2    GFR est non-AA >60 >60 ml/min/1.73m2    Calcium 8.2 (L) 8.5 - 10.1 MG/DL    Bilirubin, total 0.6 0.2 - 1.0 MG/DL    ALT (SGPT) 49 12 - 78 U/L    AST (SGOT) 54 (H) 15 - 37 U/L    Alk.  phosphatase 103 45 - 117 U/L    Protein, total 6.6 6.4 - 8.2 g/dL    Albumin 2.2 (L) 3.5 - 5.0 g/dL    Globulin 4.4 (H) 2.0 - 4.0 g/dL    A-G Ratio 0.5 (L) 1.1 - 2.2     MAGNESIUM    Collection Time: 06/18/21  1:42 AM   Result Value Ref Range    Magnesium 2.3 1.6 - 2.4 mg/dL   PROTHROMBIN TIME + INR    Collection Time: 06/18/21  1:42 AM   Result Value Ref Range    INR 1.0 0.9 - 1.1      Prothrombin time 10.4 9.0 - 11.1 sec   LABRCNT(wbc:2,hgb:2,hct:2,plt:2,)  Recent Labs     06/18/21  0142 06/17/21  0333 06/16/21  1103   * 135* 134*   K 4.3 3.1* 2.4*    103 99   CO2 21 26 24   BUN 15 26* 33*   CREA 0.87 1.34* 2.05*   * 106* 143*   CA 8. 2* 7.9* 8.7   MG 2.3 2.0 1.7   PHOS  --  3.0  --    LABRCNT(sgot:3,gpt:3,ap:3,tbiL:3,TP:3,ALB:3,GLOB:3,ggt:3,aml:3,amyp:3,lpse:3,hlpse:3)  Recent Labs     06/18/21  0142   INR 1.0   PTP 10.4     Recent Labs     06/18/21  0142 06/17/21  0333 06/16/21  1103    91 108   TP 6.6 7.0 8.5*   ALB 2.2* 2.5* 3.2*   GLOB 4.4* 4.5* 5.3*   AML  --  15*  --    LPSE  --  70*  --    BRIEFLAB(B12,FOL,FOLAT,RBCF)No results found for: FOL, RBCFLABRCNT(CPK:3,CpKMB:3,ckndx:3,troiq:3)No components found for: GLPOCBRIEFLAB(CHOL,CHOLX,CHOLP,CHLST,CHOLV,HDL,HDLC,HDLP,LDL,DLDL,LDLC,DLDLP,TGL,TGLX,TRIGL,TRIGP,CHHD,CHHDX)No results for input(s): PH, PCO2, PO2 in the last 72 hours. LABRCNT(CPK:3,CpKMB:3,ckndx:3,troiq:3)ZAHEER Hanley  Recent Labs     06/16/21  1103   TROIQ <0.05   ZAHEER Martinez    Nuclear Medicine Results (most recent):  Results from [place] encounter on 06/16/21    NM HEPATOBILIARY DUCT SCAN    Narrative  INDICATION:  Abdomen pain,  Gallstones    HIDA scan is performed with IV injection of 5 mCi Tc 99m Choletec. Uniform distribution of radiotracer is observed in the liver, with prompt  excretion into the bile ducts. The gallbladder is appropriately visualized. Small bowel activity is demonstrated. Impression  Normal HIDA scan. Patent cystic duct. Problem List:     Active Problems:    Atrial fibrillation with RVR (HCC) (6/16/2021)      Nausea & vomiting (6/17/2021)      Diarrhea (6/17/2021)      Dehydration (6/17/2021)      Acute kidney injury ([facility] (6/17/2021)      Cholelithiasis (6/17/2021)        Impression:  Abnormal CT- ?duodenitis/duodenal ulcer  A flutter, cardiology wanting to start [facility]  N/V/D with fever PTA  Cholelithiasis- normal HIDA         Plan:  Patient is improving with empiric abx and treatment of A flutter per cardiology. HIDA today was normal, no sx reproduction with study.   Will proceed with EGD today to further evaluate abnormal CT findings-Duodenitis/possible ulcer. Procedure reviewed inclduing risks, benefits and alternatives and patient is in agreement with proceeding. Patient is NPO, rapid COVID neg. Rpeat H&H to ensure it is stable. RN aware of plan. The patient was counseled at length about the risks of byron Covid-19 during their perioperative period and any recovery window from their procedure. The patient was made aware that byron Covid-19  may worsen their prognosis for recovering from their procedure and lend to a higher morbidity and/or mortality risk. All material risks, benefits, and reasonable alternatives including postponing the procedure were discussed. The patient does  wish to proceed with the procedure at this time. ZAHEER Hayden    6/18/2021  10:11 AM     [id number] [facility], [address] [place] [address] [place]: [phone]    Due to patient's SOB and new onset A flutter anesthesia recommends main OR or holding off until patient's cardiopulmonary status improves. Postpone EGD, possibly Monday pending progress. Advance diet as tolerated in the interim.     ZAHEER Hayden  06/18/21  12:14 PM

## 2021-06-18 NOTE — PERIOP NOTES
TRANSFER - OUT REPORT:    Verbal report given to RN on Devika Gonzalez  being transferred to [address] [place] [address] for routine progression of care       Report consisted of patients Situation, Background, Assessment and   Recommendations(SBAR). Information from the following report(s) SBAR was reviewed with the receiving nurse. Opportunity for questions and clarification was provided.       Patient transported with:   Monitor

## 2021-06-18 NOTE — PROGRESS NOTES
Hospitalist Progress Note    NAME: Criss Tran   :  1960   MRN:  [id number]     Admit date: 2021    Today's date: 21    PCP: CAIO Reaves M.D. Cell [phone]    Anticipated discharge date: 2021    Barriers:  Need to figure out what is going on    Assessment / Plan:    SIRS POA WBC 18.2,   Lactic acidosis lactate 2.18  RUQ pain POA  Diarrhea with associated N/V POA  ? Duodenitis or Duodenal ulcer POA  Presents with RUQ tenderness, associated N/V   in RUQ  CT w/o contrast reported inflammatory stranding in the right upper quadrant extending       anterolaterally from the second portion of the duodenum and the ascending colon. Gallstones on US  Normal amylase and lipase  UA with 0-4 WBC, 0-5 RBC  CT chest with minimal atelectasis, No ASD  V/Q scan was low prob  Differential        ? Duodenitis or PUD         Cholelithiasis         ? Gastroenteritis or colitis  HIDA scan pending  IVF  IV empiric cefepime and flagyl  Holing of AC for now  IV PPI BID  Hold statin  GI and Surgery following  Stool studies pending    Acute kidney injury POA Admit BUN/creat 33 and 2.05  Hypokalemia K 2.4 POA  Hold diuretics and losartan  IVF, improving  Avoid nephrotoxic meds  Serial labs  No hydronephrosis on CT scans     New Atrial Flutter POA  Dizziness   Denies prior atrial fib or flutter  IVF  Dilt drip  Add digoxin IV given low BP  Correct electrolytes   Cardio consult  TSH wnl  ECHO in AM  Will hold off AC for now as she is Jewish and may have underlying duodenal ulcer, CHADSVASC score is 2 for gender and h/o HTN   Dizziness likely from A Flutter/hypotension, no focal deficit, hold off brain imaging for now     Acute hypoxic resp failure 2/2 bibasilar ATX  VQ scan neg  CT chest w mild bibasilar ATX  IS  Cont pulse ox  ?  Obesity hypoventilation syndrome, sleep study as outpt    HTN/HLD  Hold home anti HTN meds  Hold statin given elevated LFTs     Code Status: full   Surrogate Decision Maker:    DVT Prophylaxis: SCDs  GI Prophylaxis: not indicated  Baseline: independent        Subjective:     Chief Complaint / Reason for Physician Visit f/uabdominal pain  \"I just had some diarrhea\". Discussed with RN events overnight. Still painful in RUQ, still with some diarrhea  No further vomiting    Review of Systems:  Symptom Y/N Comments  Symptom Y/N Comments   Fever/Chills n   Chest Pain n    Poor Appetite    Edema     Cough n   Abdominal Pain y    Sputum    Joint Pain     SOB/BLACKMAN n   Headache     Nausea/vomit n   Tolerating PT/OT     Diarrhea y   Tolerating Diet y    Constipation    Other       Could NOT obtain due to:      Objective:     VITALS:   Last 24hrs VS reviewed since prior progress note.  Most recent are:  Patient Vitals for the past 24 hrs:   Temp Pulse Resp BP SpO2   06/17/21 2300 98.3 °F (36.8 °C) 95 26 120/81 98 %   06/17/21 2003 99.4 °F (37.4 °C) 91 18 127/87 96 %   06/17/21 1733  99  107/79    06/17/21 1440 98.2 °F (36.8 °C) 91 20 115/80 96 %   06/17/21 1041 98.1 °F (36.7 °C) 97 20 121/74 93 %   06/17/21 1000  (!) 118 21 102/78 92 %   06/17/21 0902  (!) 111 22 111/69 93 %   06/17/21 0715 98.8 °F (37.1 °C) (!) 115 20 109/78 92 %   06/17/21 0241 98.7 °F (37.1 °C) (!) 128 27 109/80 92 %   06/17/21 0011  (!) 139  124/84        Intake/Output Summary (Last 24 hours) at 6/17/2021 2321  Last data filed at 6/17/2021 2003  Gross per 24 hour   Intake 2772.75 ml   Output 700 ml   Net 2072.75 ml        Wt Readings from Last 12 Encounters:   06/16/21 128.4 kg (283 lb)   08/21/20 124.7 kg (275 lb)   04/15/20 124.7 kg (275 lb)   02/13/19 131.1 kg (289 lb)   08/28/18 134.3 kg (296 lb)   08/15/18 134.3 kg (296 lb)   08/02/18 134.3 kg (296 lb)   07/05/18 130.4 kg (287 lb 8 oz)   05/16/18 130.2 kg (287 lb)   11/04/16 134.7 kg (296 lb 15.4 oz)   04/27/15 134.7 kg (297 lb)   03/25/11 126.1 kg (278 lb)       PHYSICAL EXAM:    I had a face to face encounter and independently examined this patient on 06/17/21 as outlined below:    General: WD, WN. Alert, cooperative, no acute distress    EENT:  PERRL. Anicteric sclerae. MMM  Neck:  No meningismus, no thyromegaly  Resp:  CTA bilaterally, no wheezing or rales. No accessory muscle use  CV:  Irregular  rhythm,  No edema  GI:  Soft, Non distended, Mod tender in RUQ. +Bowel sounds, no rebound  LN:  No cervical or inguinal KAMILA  Neurologic:  Alert and oriented X 3, normal speech, non focal motor exam  Psych:   Good insight. Not anxious nor agitated  Skin:  No rashes. No jaundice    Reviewed most current lab test results and cultures  YES  Reviewed most current radiology test results   YES  Review and summation of old records today    NO  Reviewed patient's current orders and MAR    YES  PMH/ reviewed - no change compared to H&P  ________________________________________________________________________  Care Plan discussed with:    Comments   Patient x    Family      RN x    Care Manager     Consultant                        Multidiciplinary team rounds were held today with , nursing, pharmacist and clinical coordinator. Patient's plan of care was discussed; medications were reviewed and discharge planning was addressed. ________________________________________________________________________      Comments   >50% of visit spent in counseling and coordination of care     ________________________________________________________________________  Esau Sandhoff, MD     Procedures: see electronic medical records for all procedures/Xrays and details which were not copied into this note but were reviewed prior to creation of Plan. LABS:  I reviewed today's most current labs and imaging studies.   Pertinent labs include:  Recent Labs     06/17/21  0333 06/16/21  1103   WBC 12.4* 18.6*   HGB 10.4* 12.8   HCT 33.6* 39.6    216     Recent Labs     06/17/21  0333 06/16/21  1103   NA 135* 134*   K 3.1* 2.4*    99   CO2 26 24   * 143*   BUN 26* 33*   CREA 1.34* 2.05*   CA 7.9* 8.7   MG 2.0 1.7   PHOS 3.0  --    ALB 2.5* 3.2*   TBILI 0.6 1.4*   ALT 54 80*

## 2021-06-18 NOTE — PROCEDURES
Esophagogastroduodenoscopy Procedure Note      Le Files  1960  [id number]    Indication:  N/V/Abnormal CT scan with duodenal thickening     Endoscopist: Rikki Guevara MD    Referring Provider:  Yuliet Arrieta NP    Sedation:  MAC anesthesia Propofol    Procedure Details:  After infomed consent was obtained for the procedure, with all risks and benefits of procedure explained the patient was taken to the endoscopy suite and placed in the left lateral decubitus position. Following sequential administration of sedation as per above, the endoscope was inserted into the mouth and advanced under direct vision to third portion of the duodenum. A careful inspection was made as the gastroscope was withdrawn, including a retroflexed view of the proximal stomach; findings and interventions are described below. Findings:     Esophagus:   + Normal mucosa throughout the esophagus without any erosions, bleeding. Z line normal at 40 cm from incisors. Stomach:   + There was streaking erythema in the gastric body/antrum with small raised nodule x 2 prepyloric s/p Bxs. No blood in UGI tract,  No ulcers seen. Duodenum:   + Scope advanced past second portion to the 3rd portion of duodenum. Normal mucosa throughout the SB, normal villi, no ulcers/erosions seen in duodenum. Therapies:  biopsy of stomach antrum    Specimen: Specimens were collected as described and send to the laboratory. Complications:   None were encountered during the procedure. EBL: < 10 ml.           Recommendations:   -EGD negative for PUD/Duodenal ulcer  -acid suppression  -no contraindication for anticoagulation      Rikki Guevara MD  6/18/2021  3:35 PM

## 2021-06-18 NOTE — ANESTHESIA POSTPROCEDURE EVALUATION
Procedure(s):  ESOPHAGOGASTRODUODENOSCOPY AND GASTRIC BIOPSY. total IV anesthesia, MAC, general - backup    Anesthesia Post Evaluation        Patient location during evaluation: PACU  Note status: Adequate. Level of consciousness: responsive to verbal stimuli and sleepy but conscious  Pain management: satisfactory to patient  Airway patency: patent  Anesthetic complications: no  Cardiovascular status: acceptable  Respiratory status: acceptable  Hydration status: acceptable  Comments: +Post-Anesthesia Evaluation and Assessment    Patient: Deanna Lloyd MRN: [id number]  SSN: [id number]   YOB: 1960  Age: 62 y.o. Sex: female      Cardiovascular Function/Vital Signs    BP (!) 101/51   Pulse 79   Temp 37.7 °C (99.9 °F)   Resp 27   Ht 5' 6\" (1.676 m)   Wt 142.1 kg (313 lb 4.4 oz)   SpO2 99%   BMI 50.56 kg/m²     Patient is status post Procedure(s):  ESOPHAGOGASTRODUODENOSCOPY AND GASTRIC BIOPSY. Nausea/Vomiting: Controlled. Postoperative hydration reviewed and adequate. Pain:  Pain Scale 1: Numeric (0 - 10) (06/18/21 1616)  Pain Intensity 1: 0 (06/18/21 1616)   Managed. Neurological Status:   Neuro (WDL): Exceptions to WDL (06/18/21 1616)   At baseline. Mental Status and Level of Consciousness: Arousable. Pulmonary Status:   O2 Device: Nasal cannula (06/18/21 1616)   Adequate oxygenation and airway patent. Complications related to anesthesia: None    Post-anesthesia assessment completed. No concerns. Signed By: Parisa Robbins MD    6/18/2021  Post anesthesia nausea and vomiting:  controlled      INITIAL Post-op Vital signs:   Vitals Value Taken Time   BP 86/51 06/18/21 1652   Temp 37.7 °C (99.9 °F) 06/18/21 1548   Pulse 76 06/18/21 1656   Resp 14 06/18/21 1656   SpO2 95 % 06/18/21 1656   Vitals shown include unvalidated device data.

## 2021-06-18 NOTE — ANESTHESIA PREPROCEDURE EVALUATION
Anesthetic History     PONV          Review of Systems / Medical History  Patient summary reviewed, nursing notes reviewed and pertinent labs reviewed    Pulmonary  Within defined limits                 Neuro/Psych   Within defined limits           Cardiovascular    Hypertension        Dysrhythmias : atrial flutter and atrial fibrillation  Hyperlipidemia    Exercise tolerance: >4 METS  Comments: ECG (6/16/21): Atrial flutter with variable AV block   Nonspecific ST and T wave abnormality   When compared with ECG of 04-NOV-2016 07:23,   Atrial flutter has replaced Sinus rhythm   Vent. rate has increased BY  63 BPM    GI/Hepatic/Renal     GERD: poorly controlled    Renal disease: ARF      Comments: Cholelithiasis Endo/Other        Morbid obesity and anemia    Comments: H/O Cervical Cancer s/p cone resection Other Findings   Comments: Fibromyalgia  Sikhism         Physical Exam    Airway  Mallampati: II  TM Distance: 4 - 6 cm  Neck ROM: normal range of motion   Mouth opening: Normal     Cardiovascular  Regular rate and rhythm,  S1 and S2 normal,  no murmur, click, rub, or gallop             Dental    Dentition: Poor dentition  Comments: Several missing teeth, moderate decay.    Pulmonary  Breath sounds clear to auscultation               Abdominal  GI exam deferred       Other Findings            Anesthetic Plan    ASA: 3  Anesthesia type: total IV anesthesia, MAC and general - backup          Induction: Intravenous  Anesthetic plan and risks discussed with: Patient

## 2021-06-19 ENCOUNTER — APPOINTMENT (OUTPATIENT)
Dept: NON INVASIVE DIAGNOSTICS | Age: 61
DRG: 871 | End: 2021-06-19
Attending: GENERAL ACUTE CARE HOSPITAL
Payer: MEDICARE

## 2021-06-19 LAB
ANION GAP SERPL CALC-SCNC: 5 MMOL/L (ref 5–15)
BUN SERPL-MCNC: 9 MG/DL (ref 6–20)
BUN/CREAT SERPL: 12 (ref 12–20)
CALCIUM SERPL-MCNC: 8.6 MG/DL (ref 8.5–10.1)
CHLORIDE SERPL-SCNC: 104 MMOL/L (ref 97–108)
CO2 SERPL-SCNC: 27 MMOL/L (ref 21–32)
CREAT SERPL-MCNC: 0.77 MG/DL (ref 0.55–1.02)
DIGOXIN SERPL-MCNC: 0.5 NG/ML (ref 0.9–2)
ECHO AO ROOT DIAM: 2.62 CM
ECHO AV AREA PEAK VELOCITY: 1.84 CM2
ECHO AV AREA VTI: 1.89 CM2
ECHO AV AREA/BSA PEAK VELOCITY: 0.8 CM2/M2
ECHO AV AREA/BSA VTI: 0.8 CM2/M2
ECHO AV MEAN GRADIENT: 10.34 MMHG
ECHO AV PEAK GRADIENT: 19.62 MMHG
ECHO AV PEAK VELOCITY: 221.13 CM/S
ECHO AV VTI: 35.84 CM
ECHO EST RA PRESSURE: 10 MMHG
ECHO LA AREA 4C: 17.5 CM2
ECHO LA MAJOR AXIS: 4.04 CM
ECHO LA MINOR AXIS: 1.74 CM
ECHO LA VOL 4C: 43.84 ML (ref 22–52)
ECHO LA VOLUME INDEX A4C: 18.9 ML/M2 (ref 16–28)
ECHO LV E' LATERAL VELOCITY: 16.88 CM/S
ECHO LV E' SEPTAL VELOCITY: 14.38 CM/S
ECHO LV INTERNAL DIMENSION DIASTOLIC: 4.52 CM (ref 3.9–5.3)
ECHO LV INTERNAL DIMENSION SYSTOLIC: 3.47 CM
ECHO LV IVSD: 0.79 CM (ref 0.6–0.9)
ECHO LV MASS 2D: 125.9 G (ref 67–162)
ECHO LV MASS INDEX 2D: 54.3 G/M2 (ref 43–95)
ECHO LV POSTERIOR WALL DIASTOLIC: 0.93 CM (ref 0.6–0.9)
ECHO LVOT CARDIAC OUTPUT: 5.41 LITER/MINUTE
ECHO LVOT DIAM: 1.99 CM
ECHO LVOT PEAK GRADIENT: 6.85 MMHG
ECHO LVOT PEAK VELOCITY: 130.85 CM/S
ECHO LVOT SV: 67.7 ML
ECHO LVOT VTI: 21.77 CM
ECHO MV A VELOCITY: 47.17 CM/S
ECHO MV AREA PHT: 3.02 CM2
ECHO MV E DECELERATION TIME (DT): 171.78 MS
ECHO MV E VELOCITY: 143.45 CM/S
ECHO MV E/A RATIO: 3.04
ECHO MV E/E' LATERAL: 8.5
ECHO MV E/E' RATIO (AVERAGED): 9.24
ECHO MV E/E' SEPTAL: 9.98
ECHO MV PRESSURE HALF TIME (PHT): 72.84 MS
ECHO PV MAX VELOCITY: 145.72 CM/S
ECHO PV MEAN GRADIENT: 4.77 MMHG
ECHO PV PEAK INSTANTANEOUS GRADIENT SYSTOLIC: 8.49 MMHG
ECHO PV VTI: 21.23 CM
ECHO RIGHT VENTRICULAR SYSTOLIC PRESSURE (RVSP): 44.95 MMHG
ECHO TV REGURGITANT MAX VELOCITY: 294.72 CM/S
ECHO TV REGURGITANT MAX VELOCITY: 506.67 CM/S
ECHO TV REGURGITANT PEAK GRADIENT: 34.95 MMHG
ERYTHROCYTE [DISTWIDTH] IN BLOOD BY AUTOMATED COUNT: 20.7 % (ref 11.5–14.5)
GLUCOSE SERPL-MCNC: 129 MG/DL (ref 65–100)
HCT VFR BLD AUTO: 30.8 % (ref 35–47)
HGB BLD-MCNC: 9.5 G/DL (ref 11.5–16)
LVOT MG: 3.77 MMHG
MCH RBC QN AUTO: 26.2 PG (ref 26–34)
MCHC RBC AUTO-ENTMCNC: 30.8 G/DL (ref 30–36.5)
MCV RBC AUTO: 85.1 FL (ref 80–99)
NRBC # BLD: 0.02 K/UL (ref 0–0.01)
NRBC BLD-RTO: 0.2 PER 100 WBC
PLATELET # BLD AUTO: 189 K/UL (ref 150–400)
PMV BLD AUTO: 10.6 FL (ref 8.9–12.9)
POTASSIUM SERPL-SCNC: 2.9 MMOL/L (ref 3.5–5.1)
RBC # BLD AUTO: 3.62 M/UL (ref 3.8–5.2)
SODIUM SERPL-SCNC: 136 MMOL/L (ref 136–145)
WBC # BLD AUTO: 11.9 K/UL (ref 3.6–11)

## 2021-06-19 PROCEDURE — 80048 BASIC METABOLIC PNL TOTAL CA: CPT

## 2021-06-19 PROCEDURE — 36415 COLL VENOUS BLD VENIPUNCTURE: CPT

## 2021-06-19 PROCEDURE — 74011000258 HC RX REV CODE- 258: Performed by: GENERAL ACUTE CARE HOSPITAL

## 2021-06-19 PROCEDURE — 74011250637 HC RX REV CODE- 250/637: Performed by: INTERNAL MEDICINE

## 2021-06-19 PROCEDURE — 99231 SBSQ HOSP IP/OBS SF/LOW 25: CPT | Performed by: SURGERY

## 2021-06-19 PROCEDURE — 74011250637 HC RX REV CODE- 250/637: Performed by: NURSE PRACTITIONER

## 2021-06-19 PROCEDURE — 65660000001 HC RM ICU INTERMED STEPDOWN

## 2021-06-19 PROCEDURE — 74011250637 HC RX REV CODE- 250/637: Performed by: GENERAL ACUTE CARE HOSPITAL

## 2021-06-19 PROCEDURE — 80162 ASSAY OF DIGOXIN TOTAL: CPT

## 2021-06-19 PROCEDURE — 99233 SBSQ HOSP IP/OBS HIGH 50: CPT | Performed by: INTERNAL MEDICINE

## 2021-06-19 PROCEDURE — 85027 COMPLETE CBC AUTOMATED: CPT

## 2021-06-19 PROCEDURE — 93306 TTE W/DOPPLER COMPLETE: CPT | Performed by: INTERNAL MEDICINE

## 2021-06-19 PROCEDURE — 74011250636 HC RX REV CODE- 250/636: Performed by: INTERNAL MEDICINE

## 2021-06-19 PROCEDURE — 74011250636 HC RX REV CODE- 250/636: Performed by: GENERAL ACUTE CARE HOSPITAL

## 2021-06-19 PROCEDURE — 77010033678 HC OXYGEN DAILY

## 2021-06-19 PROCEDURE — 74011250636 HC RX REV CODE- 250/636: Performed by: NURSE PRACTITIONER

## 2021-06-19 PROCEDURE — 74011000250 HC RX REV CODE- 250: Performed by: INTERNAL MEDICINE

## 2021-06-19 PROCEDURE — 74011000250 HC RX REV CODE- 250: Performed by: NURSE PRACTITIONER

## 2021-06-19 PROCEDURE — 93306 TTE W/DOPPLER COMPLETE: CPT

## 2021-06-19 RX ORDER — POTASSIUM CHLORIDE 20 MEQ/1
40 TABLET, EXTENDED RELEASE ORAL EVERY 6 HOURS
Status: COMPLETED | OUTPATIENT
Start: 2021-06-19 | End: 2021-06-19

## 2021-06-19 RX ORDER — POTASSIUM CHLORIDE 20 MEQ/1
20 TABLET, EXTENDED RELEASE ORAL
Status: DISCONTINUED | OUTPATIENT
Start: 2021-06-19 | End: 2021-06-19

## 2021-06-19 RX ORDER — METOPROLOL TARTRATE 25 MG/1
25 TABLET, FILM COATED ORAL EVERY 12 HOURS
Status: DISCONTINUED | OUTPATIENT
Start: 2021-06-19 | End: 2021-06-22 | Stop reason: HOSPADM

## 2021-06-19 RX ADMIN — SERTRALINE 100 MG: 50 TABLET, FILM COATED ORAL at 09:59

## 2021-06-19 RX ADMIN — APIXABAN 5 MG: 5 TABLET, FILM COATED ORAL at 13:17

## 2021-06-19 RX ADMIN — METRONIDAZOLE 500 MG: 500 INJECTION, SOLUTION INTRAVENOUS at 23:14

## 2021-06-19 RX ADMIN — METRONIDAZOLE 500 MG: 500 INJECTION, SOLUTION INTRAVENOUS at 09:56

## 2021-06-19 RX ADMIN — DILTIAZEM HYDROCHLORIDE 90 MG: 30 TABLET, FILM COATED ORAL at 12:21

## 2021-06-19 RX ADMIN — METOPROLOL TARTRATE 25 MG: 25 TABLET, FILM COATED ORAL at 21:31

## 2021-06-19 RX ADMIN — Medication 10 ML: at 15:36

## 2021-06-19 RX ADMIN — Medication 10 ML: at 05:19

## 2021-06-19 RX ADMIN — DIGOXIN 0.12 MG: 125 TABLET ORAL at 09:59

## 2021-06-19 RX ADMIN — PANTOPRAZOLE SODIUM 40 MG: 40 TABLET, DELAYED RELEASE ORAL at 09:59

## 2021-06-19 RX ADMIN — DEXTROSE MONOHYDRATE 12.5 MG/HR: 50 INJECTION, SOLUTION INTRAVENOUS at 09:51

## 2021-06-19 RX ADMIN — APIXABAN 5 MG: 5 TABLET, FILM COATED ORAL at 21:31

## 2021-06-19 RX ADMIN — POTASSIUM CHLORIDE 40 MEQ: 20 TABLET, EXTENDED RELEASE ORAL at 23:14

## 2021-06-19 RX ADMIN — DILTIAZEM HYDROCHLORIDE 90 MG: 30 TABLET, FILM COATED ORAL at 17:00

## 2021-06-19 RX ADMIN — METOPROLOL TARTRATE 12.5 MG: 25 TABLET, FILM COATED ORAL at 09:59

## 2021-06-19 RX ADMIN — GABAPENTIN 100 MG: 100 CAPSULE ORAL at 10:02

## 2021-06-19 RX ADMIN — ACETAMINOPHEN 650 MG: 325 TABLET ORAL at 21:31

## 2021-06-19 RX ADMIN — POTASSIUM CHLORIDE 40 MEQ: 20 TABLET, EXTENDED RELEASE ORAL at 17:00

## 2021-06-19 RX ADMIN — GABAPENTIN 100 MG: 100 CAPSULE ORAL at 15:44

## 2021-06-19 RX ADMIN — Medication 10 ML: at 21:38

## 2021-06-19 RX ADMIN — DILTIAZEM HYDROCHLORIDE 60 MG: 60 TABLET, FILM COATED ORAL at 09:59

## 2021-06-19 RX ADMIN — CEFEPIME HYDROCHLORIDE 2 G: 2 INJECTION, POWDER, FOR SOLUTION INTRAVENOUS at 09:56

## 2021-06-19 RX ADMIN — DEXTROSE MONOHYDRATE 5 MG/HR: 50 INJECTION, SOLUTION INTRAVENOUS at 13:09

## 2021-06-19 RX ADMIN — CEFEPIME HYDROCHLORIDE 2 G: 2 INJECTION, POWDER, FOR SOLUTION INTRAVENOUS at 21:32

## 2021-06-19 RX ADMIN — GABAPENTIN 100 MG: 100 CAPSULE ORAL at 21:31

## 2021-06-19 RX ADMIN — DILTIAZEM HYDROCHLORIDE 90 MG: 30 TABLET, FILM COATED ORAL at 21:31

## 2021-06-19 RX ADMIN — DEXTROSE MONOHYDRATE 15 MG/HR: 50 INJECTION, SOLUTION INTRAVENOUS at 05:18

## 2021-06-19 RX ADMIN — METRONIDAZOLE 500 MG: 500 INJECTION, SOLUTION INTRAVENOUS at 15:44

## 2021-06-19 NOTE — PROGRESS NOTES
Hospitalist Progress Note    NAME: Devika Gonzalez   :  1960   MRN:  [id number]     Admit date: 2021    Today's date: 21    PCP: CAIO Roberts M.D. Cell [phone]    Anticipated discharge date: 2021    Barriers:  Cardioversion of atrial fib    Assessment / Plan:    Sepsis  POA WBC 18.2,   Lactic acidosis lactate 2.18  Probable gastroenteritis POA  Diarrhea with associated N/V POA  Presents with RUQ tenderness, associated N/V  Less tender in RUQ  CT w/o contrast reported inflammatory stranding in the right upper quadrant extending       anterolaterally from the second portion of the duodenum and the ascending colon. Gallstones on US, HIDA with no obstruction or evidence of cholecystitis  Normal amylase and lipase  UA with 0-4 WBC, 0-5 RBC  CT chest with minimal atelectasis, No ASD  V/Q scan was low prob  EGD with Dr Damien Gonzalez:     Esophagus:                  + Normal mucosa throughout the esophagus without any erosions, bleeding. Z line normal at 40 cm from incisors.     Stomach:                   + There was streaking erythema in the gastric body/antrum with small raised                    nodule x 2 prepyloric s/p Bxs. No blood in UGI tract,  No ulcers seen.      Duodenum:                   + Scope advanced past second portion to the 3rd portion of duodenum. Normal mucosa throughout the SB, normal villi                       no ulcers/erosions seen in duodenum.   Suspected gastroenteritis         No duodenitis or PUD         Cholelithiasis but no cholecystitis  IV empiric cefepime and flagyl x 7 days, complete course, change to PO at discharge  Starting getting OOB, wean O2    Acute kidney injury POA Admit BUN/creat 33 and 2.05, improved  Hypokalemia K 2.4 POA low again, will supplement  Holding diuretics and losartan  IVF with resolution of MINDY, stop IVF  Avoid nephrotoxic meds  Serial labs  No hydronephrosis on CT scans     New Atrial Flutter POA  Dizziness   Denies prior atrial fib or flutter  IVF  Dilt drip being transitioned to PO  Cardio consult  TSH wnl  ECHO in AM  Started on eliquis  Plan for cardioversion monday     Acute hypoxic resp failure 2/2 bibasilar ATX  VQ scan neg  CT chest w mild bibasilar ATX  Cont pulse ox    Essential HTN POA  Hyperlipidemia POA  PO cardizem, metoprolol  Hold statin given elevated LFTs     Code Status: full   Surrogate Decision Maker:    DVT Prophylaxis: SCDs  GI Prophylaxis: not indicated  Baseline: independent        Subjective:     Chief Complaint / Reason for Physician Visit f/u abdominal pain  \"I feel better\". Discussed with RN events overnight. Minimal tenderness in RUQ, no further N/V, some diarrhea  Still in atrial fib, planning for JASMINA and cardioversion    Review of Systems:  Symptom Y/N Comments  Symptom Y/N Comments   Fever/Chills n   Chest Pain n    Poor Appetite    Edema     Cough n   Abdominal Pain y minimal   Sputum    Joint Pain     SOB/BLACKMAN n   Headache     Nausea/vomit n   Tolerating PT/OT     Diarrhea y   Tolerating Diet y    Constipation    Other       Could NOT obtain due to:      Objective:     VITALS:   Last 24hrs VS reviewed since prior progress note.  Most recent are:  Patient Vitals for the past 24 hrs:   Temp Pulse Resp BP SpO2   06/19/21 1700  88  135/77    06/19/21 1529 98.2 °F (36.8 °C) 79 22 129/82 91 %   06/19/21 1430  91  127/83    06/19/21 1429  85 22 127/83 94 %   06/19/21 1428  84  (!) 129/100    06/19/21 1307  78  131/80    06/19/21 1213  78  (!) 123/101    06/19/21 1135    118/68    06/19/21 1120 98.4 °F (36.9 °C) (!) 107 22 118/68 91 %   06/19/21 1114  (!) 109      06/19/21 0959  88  135/89    06/19/21 0935  79  135/63 93 %   06/19/21 0743 99.7 °F (37.6 °C) 80 22 (!) 127/93 93 %   06/19/21 0335 98.1 °F (36.7 °C) 87 22 (!) 158/86 93 %   06/18/21 0565 99.7 °F (37.6 °C) 71 22 95/61 90 % 06/18/21 2117  92  118/80    06/18/21 1952 98.6 °F (37 °C) 96 27 139/75 92 %   06/18/21 1951 98.2 °F (36.8 °C)       06/18/21 1902  95  114/85        Intake/Output Summary (Last 24 hours) at 6/19/2021 1842  Last data filed at 6/19/2021 0335  Gross per 24 hour   Intake 1447.41 ml   Output 450 ml   Net 997.41 ml        Wt Readings from Last 12 Encounters:   06/19/21 128.4 kg (283 lb 1.1 oz)   08/21/20 124.7 kg (275 lb)   04/15/20 124.7 kg (275 lb)   02/13/19 131.1 kg (289 lb)   08/28/18 134.3 kg (296 lb)   08/15/18 134.3 kg (296 lb)   08/02/18 134.3 kg (296 lb)   07/05/18 130.4 kg (287 lb 8 oz)   05/16/18 130.2 kg (287 lb)   11/04/16 134.7 kg (296 lb 15.4 oz)   04/27/15 134.7 kg (297 lb)   03/25/11 126.1 kg (278 lb)       PHYSICAL EXAM:    I had a face to face encounter and independently examined this patient on 06/19/21 as outlined below:    General: WD, WN. Alert, cooperative, no acute distress    EENT:  PERRL. Anicteric sclerae. MMM  Resp:  CTA bilaterally, no wheezing or rales. No accessory muscle use  CV:  Irregular  rhythm,  No edema  GI:  Soft, Non distended, mild tender in RUQ. +Bowel sounds, no rebound  Neurologic:  Alert and oriented X 3, normal speech, non focal motor exam  Psych:   Good insight. Not anxious nor agitated  Skin:  No rashes. No jaundice    Reviewed most current lab test results and cultures  YES  Reviewed most current radiology test results   YES  Review and summation of old records today    NO  Reviewed patient's current orders and MAR    YES  PMH/SH reviewed - no change compared to H&P  ________________________________________________________________________  Care Plan discussed with:    Comments   Patient x    Family      RN x    Care Manager     Consultant                        Multidiciplinary team rounds were held today with , nursing, pharmacist and clinical coordinator.   Patient's plan of care was discussed; medications were reviewed and discharge planning was addressed. ________________________________________________________________________      Comments   >50% of visit spent in counseling and coordination of care     ________________________________________________________________________  Danny Mirza MD     Procedures: see electronic medical records for all procedures/Xrays and details which were not copied into this note but were reviewed prior to creation of Plan. LABS:  I reviewed today's most current labs and imaging studies.   Pertinent labs include:  Recent Labs     06/19/21 0930 06/17/21  0333   WBC 11.9* 12.4*   HGB 9.5* 10.4*   HCT 30.8* 33.6*    167     Recent Labs     06/19/21  0930 06/18/21  0142 06/17/21  0333    134* 135*   K 2.9* 4.3 3.1*    108 103   CO2 27 21 26   * 102* 106*   BUN 9 15 26*   CREA 0.77 0.87 1.34*   CA 8.6 8.2* 7.9*   MG  --  2.3 2.0   PHOS  --   --  3.0   ALB  --  2.2* 2.5*   TBILI  --  0.6 0.6   ALT  --  49 54   INR  --  1.0  --

## 2021-06-19 NOTE — PROGRESS NOTES
[address] [place] [address], [address] [facility]  [phone]      Cardiology Progress Note      6/19/2021 9:14 AM    Admit Date: 6/16/2021    Admit Diagnosis:   Atrial fibrillation with RVR ([address] [I48.91]    Subjective:     Nunu Inman has no c/o CP, SOB. GI work up ongoing, s/p neg HIDA scan. Remains in afib with rate 90-120bpm, on Dilt 15mg IV.   Adding PO Dilt today and will wean IV    Visit Vitals  /80   Pulse 78   Temp 98.4 °F (36.9 °C)   Resp 22   Ht 5' 6\" (1.676 m)   Wt 283 lb 1.1 oz (128.4 kg)   SpO2 91%   BMI 45.69 kg/m²       Current Facility-Administered Medications   Medication Dose Route Frequency    metoprolol tartrate (LOPRESSOR) tablet 25 mg  25 mg Oral Q12H    dilTIAZem IR (CARDIZEM) tablet 90 mg  90 mg Oral QID    dilTIAZem (CARDIZEM) 100 mg in dextrose 5% (MBP/ADV) 100 mL infusion  0-15 mg/hr IntraVENous TITRATE    apixaban (ELIQUIS) tablet 5 mg  5 mg Oral Q12H    sodium chloride (NS) flush 5-40 mL  5-40 mL IntraVENous Q8H    sodium chloride (NS) flush 5-40 mL  5-40 mL IntraVENous PRN    pantoprazole (PROTONIX) tablet 40 mg  40 mg Oral ACB    gabapentin (NEURONTIN) capsule 100 mg  100 mg Oral TID    sertraline (ZOLOFT) tablet 100 mg  100 mg Oral DAILY    sodium chloride (NS) flush 5-40 mL  5-40 mL IntraVENous Q8H    sodium chloride (NS) flush 5-40 mL  5-40 mL IntraVENous PRN    acetaminophen (TYLENOL) tablet 650 mg  650 mg Oral Q6H PRN    Or    acetaminophen (TYLENOL) suppository 650 mg  650 mg Rectal Q6H PRN    polyethylene glycol (MIRALAX) packet 17 g  17 g Oral DAILY PRN    ondansetron (ZOFRAN ODT) tablet 4 mg  4 mg Oral Q8H PRN    Or    ondansetron (ZOFRAN) injection 4 mg  4 mg IntraVENous Q6H PRN    digoxin (LANOXIN) tablet 0.125 mg  0.125 mg Oral DAILY    cefepime (MAXIPIME) 2 g in 0.9% sodium chloride (MBP/ADV) 100 mL MBP  2 g IntraVENous Q12H    metroNIDAZOLE (FLAGYL) IVPB premix 500 mg  500 mg IntraVENous Q8H    morphine injection 1 mg  1 mg IntraVENous Q4H PRN       Objective:      Physical Exam:  General Appearance:  morbidly obese  female in no acute distress  Chest:   Clear  Cardiovascular:  irr, irr no murmur. Abdomen:   Soft, non-tender, bowel sounds are active. Extremities: no peripheral edema  Skin:  Warm and dry. Data Review:   Recent Labs     06/19/21  0930 06/17/21  0333   WBC 11.9* 12.4*   HGB 9.5* 10.4*   HCT 30.8* 33.6*    167     Recent Labs     06/19/21  0930 06/18/21  0142 06/17/21  0333    134* 135*   K 2.9* 4.3 3.1*    108 103   CO2 27 21 26   * 102* 106*   BUN 9 15 26*   CREA 0.77 0.87 1.34*   CA 8.6 8.2* 7.9*   MG  --  2.3 2.0   PHOS  --   --  3.0   ALB  --  2.2* 2.5*   TBILI  --  0.6 0.6   ALT  --  49 54   INR  --  1.0  --        No results for input(s): TROIQ, CPK, CKMB in the last 72 hours. Intake/Output Summary (Last 24 hours) at 6/19/2021 1355  Last data filed at 6/19/2021 0335  Gross per 24 hour   Intake 1647.41 ml   Output 450 ml   Net 1197.41 ml        Telemetry: afib     Echo:  · LV: Estimated LVEF is 55 - 60%. Normal cavity size, wall thickness and systolic function (ejection fraction normal). · RV: Mildly dilated right ventricle. · MV: Mild mitral valve regurgitation is present. · PA: Mild pulmonary hypertension. Pulmonary arterial systolic pressure is 50 mmHg. Assessment:     Active Problems:    Obesity, morbid ([address] (5/16/2018)      Atrial fibrillation with RVR ([address] (6/16/2021)      Nausea & vomiting (6/17/2021)      Diarrhea (6/17/2021)      Dehydration (6/17/2021)      Acute kidney injury ([address] (6/17/2021)      Cholelithiasis (6/17/2021)      Arrhythmia (6/18/2021)      HTN (hypertension) (6/18/2021)      Dyslipidemia (6/18/2021)        Plan:     Aflutter/afib:  · No previous hx of afib/flutter  · Patient unaware of arrhythmia, feels no palpitations or other symptoms, so not sure how long she has been in fib  · Rate better controlled.   Wean IV Dilt, increase PO to 90mg PO Q6H.  · Increase PO metoprolol 25 mgBID  · Echo normal LVEF, no valve concerns, mildly dilated RV and PHTN suspected due to obesity and undiagnosed TANVI  · CHADS2 vasc score: 2. Recommend long term DOAC. Spoke with GI- OK to start despite drop in Hgb- monitor Hgb. · Discussed risks and benefits of anticoagulation, signs and symptoms of bleeding, and to call if any concerns. · Stool heme check pending- reminded patient and RN  · As a [employer], she does not accept blood products   · I discussed the risks and benefits of JASMINA/ CV Monday if still in AF with the patient who expressed understanding and wishes to proceed. Morbid obesity/ suspected TANVI:  · mildly dilated RV and PHTN suspected due to obesity and undiagnosed TANVI  · Rec.  outpatient sleep study

## 2021-06-19 NOTE — PROGRESS NOTES
Problem: Falls - Risk of  Goal: *Absence of Falls  Description: Document Maribeth Voran Fall Risk and appropriate interventions in the flowsheet.   Outcome: Progressing Towards Goal  Note: Fall Risk Interventions:  Mobility Interventions: Assess mobility with egress test, Bed/chair exit alarm, Communicate number of staff needed for ambulation/transfer, Patient to call before getting OOB         Medication Interventions: Assess postural VS orthostatic hypotension, Bed/chair exit alarm, Patient to call before getting OOB, Teach patient to arise slowly    Elimination Interventions: Call light in reach    History of Falls Interventions: Bed/chair exit alarm         Problem: Afib Pathway: Day 3  Goal: Activity/Safety  Outcome: Progressing Towards Goal  Goal: Diagnostic Test/Procedures  Outcome: Progressing Towards Goal

## 2021-06-19 NOTE — PROGRESS NOTES
1900: Bedside shift change report given to Andrae Levi (oncoming nurse) by Annamaria Gordon (offgoing nurse). Report included the following information SBAR, ED Summary, Intake/Output, MAR, Recent Results and Cardiac Rhythm a fib.    0300: myself and another nurse attempted to get her AM lab work with the vein-finder but were unsuccessful. She stated that she wished to not be poked again tonight. Will pass along to dayshift to attempt later. 0700: End of Shift Note    Bedside shift change report given to Annamaria Gordon (oncoming nurse) by Ron Wolf RN (offgoing nurse). Report included the following information SBAR, ED Summary, Intake/Output, MAR, Recent Results and Cardiac Rhythm a fib    Shift worked: night     Shift summary and any significant changes:    maintenance fluids discontinued     No changes to dilt drip. HR remained controlled all night      Concerns for physician to address:  none      Zone phone for oncoming shift:          Activity:  Activity Level: Up with Assistance  Number times ambulated in hallways past shift: 0  Number of times OOB to chair past shift: 0    Cardiac:   Cardiac Monitoring: Yes      Cardiac Rhythm: Atrial Fib    Access:   Current line(s): PIV     Genitourinary:   Urinary status: voiding    Respiratory:   O2 Device: Nasal cannula  Chronic home O2 use?: NO  Incentive spirometer at bedside: YES     GI:  Last Bowel Movement Date: 06/18/21  Current diet:  DIET ONE TIME MESSAGE  DIET NPO  ADULT DIET Regular; GI [place] (GERD/Peptic Ulcer)  Passing flatus: YES  Tolerating current diet: YES       Pain Management:   Patient states pain is manageable on current regimen: YES    Skin:  Brandon Score: 18  Interventions: speciality bed, float heels and increase time out of bed    Patient Safety:  Fall Score:  Total Score: 3  Interventions: bed/chair alarm, assistive device (walker, cane, etc), gripper socks and pt to call before getting OOB  High Fall Risk: Yes    Length of Stay:  Expected LOS: 3d 19h  Actual LOS: 3      Esperanza Barajas RN

## 2021-06-19 NOTE — PROGRESS NOTES
Hospitalist Progress Note    NAME: Mónica George   :  1960   MRN:  [id number]     Admit date: 2021    Today's date: 21    PCP: CAIO Bishop M.D. Cell [phone]    Anticipated discharge date: 2021    Barriers:  Need to figure out what is going on    Assessment / Plan:    Sepsis  POA WBC 18.2,   Lactic acidosis lactate 2.18  Probable gastroenteritis POA  Diarrhea with associated N/V POA  Presents with RUQ tenderness, associated N/V  Less tender in RUQ  CT w/o contrast reported inflammatory stranding in the right upper quadrant extending       anterolaterally from the second portion of the duodenum and the ascending colon. Gallstones on US, HIDA with no obstruction or evidence of cholecystitis  Normal amylase and lipase  UA with 0-4 WBC, 0-5 RBC  CT chest with minimal atelectasis, No ASD  V/Q scan was low prob  EGD with Dr Lincoln Remedies:     Esophagus:                  + Normal mucosa throughout the esophagus without any erosions, bleeding. Z line normal at 40 cm from incisors.     Stomach:                   + There was streaking erythema in the gastric body/antrum with small raised                    nodule x 2 prepyloric s/p Bxs. No blood in UGI tract,  No ulcers seen.      Duodenum:                   + Scope advanced past second portion to the 3rd portion of duodenum. Normal mucosa throughout the SB, normal villi                       no ulcers/erosions seen in duodenum.   Differential        No duodenitis or PUD         Cholelithiasis but no cholecystitis         Suspect Gastroenteritis or colitis  IV empiric cefepime and flagyl, complete course, change to Po at discharge  GI and Surgery following    Acute kidney injury POA Admit BUN/creat 33 and 2.05, improving  Hypokalemia K 2.4 POA  Holding diuretics and losartan  IVF with resolution of MINDY, stop IVF  Avoid nephrotoxic meds  Serial labs  No hydronephrosis on CT scans     New Atrial Flutter POA  Dizziness   Denies prior atrial fib or flutter  IVF  Dilt drip being transitioned to PO  Cardio consult  TSH wnl  ECHO in AM  [facility]     Acute hypoxic resp failure 2/2 bibasilar ATX  VQ scan neg  CT chest w mild bibasilar ATX  Cont pulse ox    Essential HTN POA  Hyperlipidemia POA  PO cardizem, metoprolol  Hold statin given elevated LFTs     Code Status: full   Surrogate Decision Maker:    DVT Prophylaxis: SCDs  GI Prophylaxis: not indicated  Baseline: independent        Subjective:     Chief Complaint / Reason for Physician Visit f/uabdominal pain  \"I am feeling better. Discussed with RN events overnight. Less tender RUQ, no further N/V and diarrhea  Still in atrial fib, rate controlled    Review of Systems:  Symptom Y/N Comments  Symptom Y/N Comments   Fever/Chills n   Chest Pain n    Poor Appetite    Edema     Cough n   Abdominal Pain y less   Sputum    Joint Pain     SOB/BLACKMAN n   Headache     Nausea/vomit n   Tolerating PT/OT     Diarrhea n   Tolerating Diet y    Constipation    Other       Could NOT obtain due to:      Objective:     VITALS:   Last 24hrs VS reviewed since prior progress note.  Most recent are:  Patient Vitals for the past 24 hrs:   Temp Pulse Resp BP SpO2   06/18/21 1952  96 27 139/75 92 %   06/18/21 1951 98.2 °F (36.8 °C)       06/18/21 1902  95  114/85    06/18/21 1657  76 21 (!) 114/51 95 %   06/18/21 1635  79 27 (!) 101/51 99 %   06/18/21 1630  77 20 (!) 99/59 95 %   06/18/21 1625  76 17 (!) 108/54 100 %   06/18/21 1620  79 19 110/60 97 %   06/18/21 1615  76 18 (!) 95/59 97 %   06/18/21 1610  77 14 (!) 100/59 97 %   06/18/21 1605  78 17 (!) 101/59 98 %   06/18/21 1600  74 24 100/63 96 %   06/18/21 1555  74 18 (!) 100/54 100 %   06/18/21 1552  75 18 98/62 100 %   06/18/21 1550  79 16 (!) 88/64 93 %   06/18/21 1548 99.9 °F (37.7 °C) 77 14 92/66 100 %   06/18/21 1545  86 19 92/66 98 %   06/18/21 1433 98.5 °F (36.9 °C) 75 20 (!) 122/101 98 %   06/18/21 1234  89  110/86    06/18/21 1046 97.7 °F (36.5 °C) 85 18 (!) 118/98 98 %   06/18/21 0939  76  112/77    06/18/21 0732 98.7 °F (37.1 °C) 85 18 113/81 95 %   06/18/21 0522  92  108/77    06/18/21 0302 97.9 °F (36.6 °C) 82 17 104/74 97 %   06/17/21 2346  (!) 111  107/79    06/17/21 2300 98.3 °F (36.8 °C) 95 26 120/81 98 %       Intake/Output Summary (Last 24 hours) at 6/18/2021 2037  Last data filed at 6/18/2021 1537  Gross per 24 hour   Intake 1083.08 ml   Output 1000 ml   Net 83.08 ml        Wt Readings from Last 12 Encounters:   06/18/21 142.1 kg (313 lb 4.4 oz)   08/21/20 124.7 kg (275 lb)   04/15/20 124.7 kg (275 lb)   02/13/19 131.1 kg (289 lb)   08/28/18 134.3 kg (296 lb)   08/15/18 134.3 kg (296 lb)   08/02/18 134.3 kg (296 lb)   07/05/18 130.4 kg (287 lb 8 oz)   05/16/18 130.2 kg (287 lb)   11/04/16 134.7 kg (296 lb 15.4 oz)   04/27/15 134.7 kg (297 lb)   03/25/11 126.1 kg (278 lb)       PHYSICAL EXAM:    I had a face to face encounter and independently examined this patient on 06/18/21 as outlined below:    General: WD, WN. Alert, cooperative, no acute distress    EENT:  PERRL. Anicteric sclerae. MMM  Resp:  CTA bilaterally, no wheezing or rales. No accessory muscle use  CV:  Irregular  rhythm,  No edema  GI:  Soft, Non distended, Mod tender in RUQ. +Bowel sounds, no rebound  Neurologic:  Alert and oriented X 3, normal speech, non focal motor exam  Psych:   Good insight. Not anxious nor agitated  Skin:  No rashes.   No jaundice    Reviewed most current lab test results and cultures  YES  Reviewed most current radiology test results   YES  Review and summation of old records today    NO  Reviewed patient's current orders and MAR    YES  PMH/SH reviewed - no change compared to H&P  ________________________________________________________________________  Care Plan discussed with:    Comments   Patient x    Family      RN x    Care Manager Consultant                        Multidiciplinary team rounds were held today with , nursing, pharmacist and clinical coordinator. Patient's plan of care was discussed; medications were reviewed and discharge planning was addressed. ________________________________________________________________________      Comments   >50% of visit spent in counseling and coordination of care     ________________________________________________________________________  Laury Nassar MD     Procedures: see electronic medical records for all procedures/Xrays and details which were not copied into this note but were reviewed prior to creation of Plan. LABS:  I reviewed today's most current labs and imaging studies.   Pertinent labs include:  Recent Labs     06/17/21  0333 06/16/21  1103   WBC 12.4* 18.6*   HGB 10.4* 12.8   HCT 33.6* 39.6    216     Recent Labs     06/18/21  0142 06/17/21  0333 06/16/21  1103   * 135* 134*   K 4.3 3.1* 2.4*    103 99   CO2 21 26 24   * 106* 143*   BUN 15 26* 33*   CREA 0.87 1.34* 2.05*   CA 8.2* 7.9* 8.7   MG 2.3 2.0 1.7   PHOS  --  3.0  --    ALB 2.2* 2.5* 3.2*   TBILI 0.6 0.6 1.4*   ALT 49 54 80*   INR 1.0  --   --

## 2021-06-19 NOTE — PROGRESS NOTES
GI Progress Note (for Rajeev/Cummins)  David Villalta :1960 JUANJOSE:[id number]   [facility]: Chanda Bowen MD  PCP: Liberty Griffin NP  Date/Time:  2021 1:55 PM   Assessment:   · AP/resolved felt likely 2/2 gastroenteritis  · Abnormal CT w/ ?PUD  · EGD unremarkable on   · AFIB with RVR following closely by Cardiology/Dr. Tania Michaud  · Colonoscopy in  with small HP and medium sized internal hemorrhoids  · Chronic trivial rectal bleeding likely 2/2 known internal hemorrhoids  · Trend down in Hgb without significant over bleeding     Plan:   · Ok for anticoagulation as noted by Dr. Honorio Olguin  · No plans for Colonoscopy at this time given recent unremarkable Colonoscopy    Above d/w Dr. Verónica Romano will see on request tomorrow, otherwise Dr. Dom Kennedy will assume GI care on       Subjective:   Discussed with RN events overnight. Complaint Y/N Description   Abdominal Pain     Hematemesis     Hematochezia     Melena     Constipation     Diarrhea     Dyspepsia     Dysphagia     Jaundiced     Nausea/vomiting       Review of Systems:  Symptom Y/N Comments  Symptom Y/N Comments   Fever/Chills    Chest Pain     Cough    Headaches     Sputum    Joint Pain     SOB/BLACKMAN    Pruritis/Rash     Tolerating Diet    Other       Could NOT obtain due to:      Objective:   VITALS:   Last 24hrs VS reviewed since prior progress note. Most recent are:  Visit Vitals  /80   Pulse 78   Temp 98.4 °F (36.9 °C)   Resp 22   Ht 5' 6\" (1.676 m)   Wt 128.4 kg (283 lb 1.1 oz)   SpO2 91%   BMI 45.69 kg/m²       Intake/Output Summary (Last 24 hours) at 2021 1355  Last data filed at 2021 0335  Gross per 24 hour   Intake 1647.41 ml   Output 450 ml   Net 1197.41 ml     PHYSICAL EXAM:  General: WD, WN. Alert, cooperative, no acute distress    HEENT: NC, Atraumatic. PERRLA, EOMI. Anicteric sclerae. Lungs:  CTA Bilaterally. No Wheezing/Rhonchi/Rales.   Heart:  Regular  rhythm,  No murmur (), No Rubs, No Gallops  Abdomen: Soft, +morbidly obese, non distended, Non tender.  +Bowel sounds, no HSM  Extremities: No c/c/e  Neurologic:  No acute neurological distress     Lab and Radiology Data Reviewed: (see below)    Medications Reviewed: (see below)  PMH/SH reviewed - no change compared to H&P  ________________________________________________________________________  Care Plan discussed with:  Patient x   Family  x   RN               Consultant:  christy Adams MD     Procedures: see electronic medical records for all procedures/Xrays and details which were not copied into this note but were reviewed prior to creation of Plan. LABS:  Recent Labs     06/19/21 0930 06/17/21 0333   WBC 11.9* 12.4*   HGB 9.5* 10.4*   HCT 30.8* 33.6*    167     Recent Labs     06/19/21 0930 06/18/21 0142 06/17/21  0333    134* 135*   K 2.9* 4.3 3.1*    108 103   CO2 27 21 26   BUN 9 15 26*   CREA 0.77 0.87 1.34*   * 102* 106*   CA 8.6 8.2* 7.9*   MG  --  2.3 2.0   PHOS  --   --  3.0     Recent Labs     06/18/21 0142 06/17/21  0333    91   TP 6.6 7.0   ALB 2.2* 2.5*   GLOB 4.4* 4.5*   AML  --  15*   LPSE  --  70*     Recent Labs     06/18/21 0142   INR 1.0   PTP 10.4      No results for input(s): FE, TIBC, PSAT, FERR in the last 72 hours. No results found for: FOL, RBCF  No results for input(s): PH, PCO2, PO2 in the last 72 hours. No results for input(s): CPK, CKMB in the last 72 hours.     No lab exists for component: TROPONINI  Lab Results   Component Value Date/Time    Color YELLOW/STRAW 06/16/2021 04:12 PM    Appearance TURBID (A) 06/16/2021 04:12 PM    Specific gravity 1.021 06/16/2021 04:12 PM    pH (UA) 5.5 06/16/2021 04:12 PM    Protein 30 (A) 06/16/2021 04:12 PM    Glucose Negative 06/16/2021 04:12 PM    Ketone Negative 06/16/2021 04:12 PM    Bilirubin Negative 06/16/2021 04:12 PM    Urobilinogen 0.2 06/16/2021 04:12 PM    Nitrites Negative 06/16/2021 04:12 PM    Leukocyte Esterase Negative 06/16/2021 04:12 PM    Epithelial cells FEW 06/16/2021 04:12 PM    Bacteria Negative 06/16/2021 04:12 PM    WBC 0-4 06/16/2021 04:12 PM    RBC 0-5 06/16/2021 04:12 PM       MEDICATIONS:  Current Facility-Administered Medications   Medication Dose Route Frequency    metoprolol tartrate (LOPRESSOR) tablet 25 mg  25 mg Oral Q12H    dilTIAZem IR (CARDIZEM) tablet 90 mg  90 mg Oral QID    dilTIAZem (CARDIZEM) 100 mg in dextrose 5% (MBP/ADV) 100 mL infusion  0-15 mg/hr IntraVENous TITRATE    apixaban (ELIQUIS) tablet 5 mg  5 mg Oral Q12H    sodium chloride (NS) flush 5-40 mL  5-40 mL IntraVENous Q8H    sodium chloride (NS) flush 5-40 mL  5-40 mL IntraVENous PRN    pantoprazole (PROTONIX) tablet 40 mg  40 mg Oral ACB    gabapentin (NEURONTIN) capsule 100 mg  100 mg Oral TID    sertraline (ZOLOFT) tablet 100 mg  100 mg Oral DAILY    sodium chloride (NS) flush 5-40 mL  5-40 mL IntraVENous Q8H    sodium chloride (NS) flush 5-40 mL  5-40 mL IntraVENous PRN    acetaminophen (TYLENOL) tablet 650 mg  650 mg Oral Q6H PRN    Or    acetaminophen (TYLENOL) suppository 650 mg  650 mg Rectal Q6H PRN    polyethylene glycol (MIRALAX) packet 17 g  17 g Oral DAILY PRN    ondansetron (ZOFRAN ODT) tablet 4 mg  4 mg Oral Q8H PRN    Or    ondansetron (ZOFRAN) injection 4 mg  4 mg IntraVENous Q6H PRN    digoxin (LANOXIN) tablet 0.125 mg  0.125 mg Oral DAILY    cefepime (MAXIPIME) 2 g in 0.9% sodium chloride (MBP/ADV) 100 mL MBP  2 g IntraVENous Q12H    metroNIDAZOLE (FLAGYL) IVPB premix 500 mg  500 mg IntraVENous Q8H    morphine injection 1 mg  1 mg IntraVENous Q4H PRN

## 2021-06-19 NOTE — PROGRESS NOTES
Pharmacy - Digoxin Monitoring and Dosing Recommendations    Digoxin Indication:  enhance HR and BP control in AF/RVR while on Diltiazem and metoprolol    Digoxin Dose: 0.125 mg PO daily    Significant Interacting Medications: none    Labs:  Recent Labs     06/19/21  0930 06/18/21  0142 06/17/21  0333   CREA 0.77 0.87 1.34*   K 2.9* 4.3 3.1*     Digoxin level:  6/19 0.5    Creatinine Clearance (mL/min): >60 ml/min    HR goal:<100. decreased from 128 (6/16) to 78 (6/19)   BP: 158/86 (6/18); 131/80 (6/19)    Impression/Plan:   HR 78 today. Met HR goal:<100. Weaning IV diltiazem to PO 90 mg QID and metoprolol 25 mg q12h. Continue Digoxin 0.125 mg PO daily   KCl 20 meq PO q3h x3 for a total of 60 meq today  BMP daily, Mg q2d. Replete K as needed. recheck dig level on 6/22.   .     Thanks,  Home Serrano, PHARMD

## 2021-06-19 NOTE — PROGRESS NOTES
SURGERY PROGRESS NOTE      Admit Date: 2021    POD 1 Day Post-Op    Procedure: Procedure(s):  ESOPHAGOGASTRODUODENOSCOPY AND GASTRIC BIOPSY      Subjective:     Patient feels much better. Denies pain and nausea. Tolerating PO. Wants to go home      Objective:     Visit Vitals  BP (!) 127/93   Pulse 80   Temp 99.7 °F (37.6 °C)   Resp 22   Ht 5' 6\" (1.676 m)   Wt 148.2 kg (326 lb 11.6 oz)   SpO2 93%   BMI 52.73 kg/m²        Temp (24hrs), Av.8 °F (37.1 °C), Min:97.7 °F (36.5 °C), Max:99.9 °F (37.7 °C)      No intake/output data recorded.  1901 -  0700  In: 5303.2 [P.O.:490; I.V.:4813.2]  Out: 1450 [Urine:1450]    Physical Exam:    General:  alert, cooperative, no distress, appears stated age   Abdomen: soft, bowel sounds active, non-tender           Lab Results   Component Value Date/Time    WBC 12.4 (H) 2021 03:33 AM    HGB 10.4 (L) 2021 03:33 AM    Hemoglobin (POC) 10.6 (L) 2021 02:49 PM    HCT 33.6 (L) 2021 03:33 AM    PLATELET 895  03:33 AM    MCV 86.8 2021 03:33 AM     Lab Results   Component Value Date/Time    GFR est non-AA >60 2021 01:42 AM    GFR est AA >60 2021 01:42 AM    Creatinine 0.87 2021 01:42 AM    BUN 15 2021 01:42 AM    Sodium 134 (L) 2021 01:42 AM    Potassium 4.3 2021 01:42 AM    Chloride 108 2021 01:42 AM    CO2 21 2021 01:42 AM    Magnesium 2.3 2021 01:42 AM    Phosphorus 3.0 2021 03:33 AM     HIDA -  No acute cholecystitis     Endoscopy -  No duodenal inflammation     Assessment:     Active Problems:    Obesity, morbid ([facility] (2018)      Atrial fibrillation with RVR ([facility] [address] (2021)      Nausea & vomiting (2021)      Diarrhea (2021)      Dehydration (2021)      Acute kidney injury ([facility] [address] (2021)      Cholelithiasis (2021)      Arrhythmia (2021)      HTN (hypertension) (2021)      Dyslipidemia (2021)    62year old with enteritis. Symptomatically better. No need for surgery. Can be discharged from surgery perspective.

## 2021-06-19 NOTE — PROGRESS NOTES
06/18/21 2325   Vitals   Temp 99.7 °F (37.6 °C)   Temp Source Oral   Pulse (Heart Rate) 71   Heart Rate Source Monitor   Resp Rate 22   O2 Sat (%) 90 %   Level of Consciousness Alert (0)   BP 95/61   MAP (Calculated) 72   Cardiac Rhythm Atrial Fib   MEWS Score 3   Box Number hardwired   Electrodes Replaced No     Will notify charge of MEWS and increase frequency of monitoring

## 2021-06-20 LAB — HEMOCCULT STL QL: NEGATIVE

## 2021-06-20 PROCEDURE — 94640 AIRWAY INHALATION TREATMENT: CPT

## 2021-06-20 PROCEDURE — 82272 OCCULT BLD FECES 1-3 TESTS: CPT

## 2021-06-20 PROCEDURE — 74011250637 HC RX REV CODE- 250/637: Performed by: INTERNAL MEDICINE

## 2021-06-20 PROCEDURE — 74011250636 HC RX REV CODE- 250/636: Performed by: GENERAL ACUTE CARE HOSPITAL

## 2021-06-20 PROCEDURE — 77010033678 HC OXYGEN DAILY

## 2021-06-20 PROCEDURE — 74011000258 HC RX REV CODE- 258: Performed by: GENERAL ACUTE CARE HOSPITAL

## 2021-06-20 PROCEDURE — 74011250637 HC RX REV CODE- 250/637: Performed by: GENERAL ACUTE CARE HOSPITAL

## 2021-06-20 PROCEDURE — 65660000001 HC RM ICU INTERMED STEPDOWN

## 2021-06-20 PROCEDURE — 99233 SBSQ HOSP IP/OBS HIGH 50: CPT | Performed by: INTERNAL MEDICINE

## 2021-06-20 PROCEDURE — 74011000250 HC RX REV CODE- 250: Performed by: INTERNAL MEDICINE

## 2021-06-20 RX ORDER — ALBUTEROL SULFATE 0.83 MG/ML
2.5 SOLUTION RESPIRATORY (INHALATION)
Status: DISCONTINUED | OUTPATIENT
Start: 2021-06-20 | End: 2021-06-21

## 2021-06-20 RX ADMIN — Medication 5 ML: at 14:00

## 2021-06-20 RX ADMIN — ACETAMINOPHEN 650 MG: 325 TABLET ORAL at 16:07

## 2021-06-20 RX ADMIN — SERTRALINE 100 MG: 50 TABLET, FILM COATED ORAL at 08:35

## 2021-06-20 RX ADMIN — METOPROLOL TARTRATE 25 MG: 25 TABLET, FILM COATED ORAL at 22:10

## 2021-06-20 RX ADMIN — APIXABAN 5 MG: 5 TABLET, FILM COATED ORAL at 08:35

## 2021-06-20 RX ADMIN — ALBUTEROL SULFATE 2.5 MG: 2.5 SOLUTION RESPIRATORY (INHALATION) at 15:02

## 2021-06-20 RX ADMIN — Medication 10 ML: at 22:12

## 2021-06-20 RX ADMIN — METRONIDAZOLE 500 MG: 500 INJECTION, SOLUTION INTRAVENOUS at 22:13

## 2021-06-20 RX ADMIN — DILTIAZEM HYDROCHLORIDE 90 MG: 30 TABLET, FILM COATED ORAL at 22:09

## 2021-06-20 RX ADMIN — METRONIDAZOLE 500 MG: 500 INJECTION, SOLUTION INTRAVENOUS at 07:22

## 2021-06-20 RX ADMIN — GABAPENTIN 100 MG: 100 CAPSULE ORAL at 16:07

## 2021-06-20 RX ADMIN — ALBUTEROL SULFATE 2.5 MG: 2.5 SOLUTION RESPIRATORY (INHALATION) at 20:14

## 2021-06-20 RX ADMIN — Medication 10 ML: at 05:27

## 2021-06-20 RX ADMIN — METOPROLOL TARTRATE 25 MG: 25 TABLET, FILM COATED ORAL at 08:34

## 2021-06-20 RX ADMIN — GABAPENTIN 100 MG: 100 CAPSULE ORAL at 08:35

## 2021-06-20 RX ADMIN — DILTIAZEM HYDROCHLORIDE 90 MG: 30 TABLET, FILM COATED ORAL at 17:00

## 2021-06-20 RX ADMIN — METRONIDAZOLE 500 MG: 500 INJECTION, SOLUTION INTRAVENOUS at 16:07

## 2021-06-20 RX ADMIN — DIGOXIN 0.12 MG: 125 TABLET ORAL at 08:35

## 2021-06-20 RX ADMIN — CEFEPIME HYDROCHLORIDE 2 G: 2 INJECTION, POWDER, FOR SOLUTION INTRAVENOUS at 22:11

## 2021-06-20 RX ADMIN — DILTIAZEM HYDROCHLORIDE 90 MG: 30 TABLET, FILM COATED ORAL at 12:49

## 2021-06-20 RX ADMIN — CEFEPIME HYDROCHLORIDE 2 G: 2 INJECTION, POWDER, FOR SOLUTION INTRAVENOUS at 08:34

## 2021-06-20 RX ADMIN — GABAPENTIN 100 MG: 100 CAPSULE ORAL at 22:10

## 2021-06-20 RX ADMIN — DILTIAZEM HYDROCHLORIDE 90 MG: 30 TABLET, FILM COATED ORAL at 08:35

## 2021-06-20 RX ADMIN — APIXABAN 5 MG: 5 TABLET, FILM COATED ORAL at 22:10

## 2021-06-20 RX ADMIN — PANTOPRAZOLE SODIUM 40 MG: 40 TABLET, DELAYED RELEASE ORAL at 08:36

## 2021-06-20 NOTE — PROGRESS NOTES
1900: Bedside shift change report given to Andrae Elvajossraheem Levi (oncoming nurse) by Rogelio Schaffer (offgoing nurse). Report included the following information SBAR, ED Summary, Intake/Output, MAR, Recent Results and Cardiac Rhythm a fib.     0000: pt refused AM labwork. She stated that she had a traumatic time with PICC team trying to put an IV in her today and she does not wish to be poked again. 0700: End of Shift Note    Bedside shift change report given to Shira (oncoming nurse) by Damaris Carvalho RN (offgoing nurse). Report included the following information SBAR, ED Summary, Intake/Output, MAR, Recent Results and Cardiac Rhythm a fib    Shift worked:  night      Shift summary and any significant changes:    Pt did not want am labs      Concerns for physician to address:  see above, refused labs     Zone phone for oncoming shift:          Activity:  Activity Level: Up with Assistance  Number times ambulated in hallways past shift: 0  Number of times OOB to chair past shift: 0    Cardiac:   Cardiac Monitoring: Yes      Cardiac Rhythm: Atrial Fib    Access:   Current line(s): PIV     Genitourinary:   Urinary status: voiding    Respiratory:   O2 Device: Nasal cannula  Chronic home O2 use?: NO  Incentive spirometer at bedside: YES     GI:  Last Bowel Movement Date: 06/18/21  Current diet:  DIET ONE TIME MESSAGE  ADULT DIET Regular; GI [place] (GERD/Peptic Ulcer)  DIET NPO  Passing flatus: YES  Tolerating current diet: YES       Pain Management:   Patient states pain is manageable on current regimen: YES    Skin:  Brandon Score: 19  Interventions: speciality bed, float heels and increase time out of bed    Patient Safety:  Fall Score:  Total Score: 3  Interventions: bed/chair alarm, assistive device (walker, cane, etc), gripper socks and pt to call before getting OOB  High Fall Risk: Yes    Length of Stay:  Expected LOS: 3d 19h  Actual LOS: 4      Damaris Carvalho RN

## 2021-06-20 NOTE — PROGRESS NOTES
[address] [place] [address]  [phone]      Cardiology Progress Note      6/20/2021 9:14 AM    Admit Date: 6/16/2021    Admit Diagnosis:   Atrial fibrillation with RVR ([address] [I48.91]    Subjective:     Jose Garvin has no c/o CP, SOB. AF rate controlled now on 3 rate controlling medications. Coughing a lot which she says has been going on for a year.     Visit Vitals  /76   Pulse 97   Temp (!) 100.6 °F (38.1 °C)   Resp 20   Ht 5' 6\" (1.676 m)   Wt 283 lb 1.1 oz (128.4 kg)   SpO2 90%   BMI 45.69 kg/m²       Current Facility-Administered Medications   Medication Dose Route Frequency    albuterol (PROVENTIL VENTOLIN) nebulizer solution 2.5 mg  2.5 mg Inhalation QID RT    metoprolol tartrate (LOPRESSOR) tablet 25 mg  25 mg Oral Q12H    dilTIAZem IR (CARDIZEM) tablet 90 mg  90 mg Oral QID    apixaban (ELIQUIS) tablet 5 mg  5 mg Oral Q12H    sodium chloride (NS) flush 5-40 mL  5-40 mL IntraVENous Q8H    sodium chloride (NS) flush 5-40 mL  5-40 mL IntraVENous PRN    pantoprazole (PROTONIX) tablet 40 mg  40 mg Oral ACB    gabapentin (NEURONTIN) capsule 100 mg  100 mg Oral TID    sertraline (ZOLOFT) tablet 100 mg  100 mg Oral DAILY    sodium chloride (NS) flush 5-40 mL  5-40 mL IntraVENous Q8H    sodium chloride (NS) flush 5-40 mL  5-40 mL IntraVENous PRN    acetaminophen (TYLENOL) tablet 650 mg  650 mg Oral Q6H PRN    Or    acetaminophen (TYLENOL) suppository 650 mg  650 mg Rectal Q6H PRN    polyethylene glycol (MIRALAX) packet 17 g  17 g Oral DAILY PRN    ondansetron (ZOFRAN ODT) tablet 4 mg  4 mg Oral Q8H PRN    Or    ondansetron (ZOFRAN) injection 4 mg  4 mg IntraVENous Q6H PRN    digoxin (LANOXIN) tablet 0.125 mg  0.125 mg Oral DAILY    cefepime (MAXIPIME) 2 g in 0.9% sodium chloride (MBP/ADV) 100 mL MBP  2 g IntraVENous Q12H    metroNIDAZOLE (FLAGYL) IVPB premix 500 mg  500 mg IntraVENous Q8H    morphine injection 1 mg  1 mg IntraVENous Q4H PRN Objective:      Physical Exam:  General Appearance:  morbidly obese  female in no acute distress  Chest:   Clear  Cardiovascular:  irr, irr no murmur. Abdomen:   Soft, non-tender, bowel sounds are active. Extremities: no peripheral edema  Skin:  Warm and dry. Data Review:   Recent Labs     06/19/21  0930   WBC 11.9*   HGB 9.5*   HCT 30.8*        Recent Labs     06/19/21  0930 06/18/21  0142    134*   K 2.9* 4.3    108   CO2 27 21   * 102*   BUN 9 15   CREA 0.77 0.87   CA 8.6 8.2*   MG  --  2.3   ALB  --  2.2*   TBILI  --  0.6   ALT  --  49   INR  --  1.0       No results for input(s): TROIQ, CPK, CKMB in the last 72 hours. Intake/Output Summary (Last 24 hours) at 6/20/2021 1617  Last data filed at 6/20/2021 0406  Gross per 24 hour   Intake 737.8 ml   Output    Net 737.8 ml        Telemetry: afib     Echo:  · LV: Estimated LVEF is 55 - 60%. Normal cavity size, wall thickness and systolic function (ejection fraction normal). · RV: Mildly dilated right ventricle. · MV: Mild mitral valve regurgitation is present. · PA: Mild pulmonary hypertension. Pulmonary arterial systolic pressure is 50 mmHg. Assessment:     Active Problems:    Obesity, morbid ([facility] [address] (5/16/2018)      Atrial fibrillation with RVR ([facility] [address] (6/16/2021)      Nausea & vomiting (6/17/2021)      Diarrhea (6/17/2021)      Dehydration (6/17/2021)      Acute kidney injury ([facility] [address] (6/17/2021)      Cholelithiasis (6/17/2021)      Arrhythmia (6/18/2021)      HTN (hypertension) (6/18/2021)      Dyslipidemia (6/18/2021)        Plan:     Aflutter/afib:  · No previous hx of afib/flutter  · Patient unaware of arrhythmia, feels no palpitations or other symptoms, so not sure how long she has been in fib  · Rate better controlled.   Wean IV Dilt, increase PO to 90mg PO Q6H.  · Increase PO metoprolol 25 mgBID  · Echo normal LVEF, no valve concerns, mildly dilated RV and PHTN suspected due to obesity and undiagnosed TANVI  · CHADS2 vasc score: 2. Recommend long term DOAC. Spoke with GI- OK to start despite drop in Hgb- monitor Hgb. · Discussed risks and benefits of anticoagulation, signs and symptoms of bleeding, and to call if any concerns. · Stool heme check negative   · As a [employer]ian[employer], she does not accept blood products   · I discussed the risks and benefits of JASMINA/ CV Monday if still in AF with the patient who expressed understanding and wishes to proceed. Morbid obesity/ suspected TANVI:  · mildly dilated RV and PHTN suspected due to obesity and undiagnosed TANVI  · Rec. outpatient sleep study    Frequent cough which has been present for over a year:  Try nebulizers in case there is a component of asthma.   Evidently lisinopril was stopped for this in the past

## 2021-06-20 NOTE — PROGRESS NOTES
Pharmacy - Digoxin    Digoxin Indication:  enhance HR and BP control in AF/RVR while on Diltiazem and metoprolol    Digoxin Dose: 0.125 mg PO daily      Impression/Plan:   Patient refused lab draw today, will attempt again in AM  BMP, Mg++, Dig level re-ordered.   .     Thanks,  Barbi Rush, PHARMD

## 2021-06-20 NOTE — PROGRESS NOTES
0700: Bedside shift change report given to Rosa Price, [facility] (oncoming nurse) by Mayte Irizarry RN (offgoing nurse). Report included the following information SBAR and Kardex. 1845: Pt refusing labs until picc team comes tomorrow.

## 2021-06-20 NOTE — PROGRESS NOTES
ADULT PROTOCOL: JET AEROSOL  REASSESSMENT    Patient  Evan Poag     62 y.o.   female     6/20/2021  5:55 PM    Breath Sounds Pre Procedure: Right Breath Sounds: Diminished                               Left Breath Sounds: Diminished    Breath Sounds Post Procedure: Right Breath Sounds: Diminished                                 Left Breath Sounds: Diminished    Breathing pattern: Pre procedure Breathing Pattern: Regular          Post procedure Breathing Pattern: Regular    Heart Rate: Pre procedure Pulse: 96           Post procedure Pulse: 96    Resp Rate: Pre procedure Respirations: 18           Post procedure Respirations: 18    Cough: Pre procedure Cough: Non-productive               Post procedure Cough: Non-productive    Oxygen: O2 Device: Nasal cannula      Changed: no    SpO2: Pre procedure SpO2: 90 %                Post procedure SpO2: 90 %     Nebulizer Therapy: Current medications Aerosolized Medications: Albuterol      Changed:no    Problem List:   Patient Active Problem List   Diagnosis Code    Cancer ([facility] C80.1    Plantar fasciitis M72.2    Hypovitaminosis D E55.9    Obesity, morbid (HCC) E66.01    Gastroesophageal reflux disease without esophagitis K21.9    Atrial fibrillation with RVR ([facility]) I48.91    Nausea & vomiting R11.2    Diarrhea R19.7    Dehydration E86.0    Acute kidney injury (HCC) N17.9    Cholelithiasis K80.20    Arrhythmia I49.9    HTN (hypertension) I10    Dyslipidemia E78.5       Respiratory Therapist: Indira Dorsey

## 2021-06-21 ENCOUNTER — APPOINTMENT (OUTPATIENT)
Dept: NON INVASIVE DIAGNOSTICS | Age: 61
DRG: 871 | End: 2021-06-21
Attending: INTERNAL MEDICINE
Payer: MEDICARE

## 2021-06-21 ENCOUNTER — APPOINTMENT (OUTPATIENT)
Dept: GENERAL RADIOLOGY | Age: 61
DRG: 871 | End: 2021-06-21
Attending: INTERNAL MEDICINE
Payer: MEDICARE

## 2021-06-21 LAB
ALBUMIN SERPL-MCNC: 2.3 G/DL (ref 3.5–5)
ALBUMIN/GLOB SERPL: 0.5 {RATIO} (ref 1.1–2.2)
ALP SERPL-CCNC: 109 U/L (ref 45–117)
ALT SERPL-CCNC: 24 U/L (ref 12–78)
ANION GAP SERPL CALC-SCNC: 6 MMOL/L (ref 5–15)
AST SERPL-CCNC: 17 U/L (ref 15–37)
ATRIAL RATE: 75 BPM
BACTERIA SPEC CULT: NORMAL
BASOPHILS # BLD: 0 K/UL (ref 0–0.1)
BASOPHILS NFR BLD: 0 % (ref 0–1)
BILIRUB SERPL-MCNC: 0.5 MG/DL (ref 0.2–1)
BNP SERPL-MCNC: 2077 PG/ML
BUN SERPL-MCNC: 8 MG/DL (ref 6–20)
BUN/CREAT SERPL: 12 (ref 12–20)
CALCIUM SERPL-MCNC: 8.6 MG/DL (ref 8.5–10.1)
CALCULATED P AXIS, ECG09: 60 DEGREES
CALCULATED R AXIS, ECG10: 66 DEGREES
CALCULATED T AXIS, ECG11: 59 DEGREES
CAMPYLOBACTER SPECIES, DNA: NEGATIVE
CHLORIDE SERPL-SCNC: 106 MMOL/L (ref 97–108)
CO2 SERPL-SCNC: 28 MMOL/L (ref 21–32)
CREAT SERPL-MCNC: 0.67 MG/DL (ref 0.55–1.02)
DIAGNOSIS, 93000: NORMAL
DIFFERENTIAL METHOD BLD: ABNORMAL
DIGOXIN SERPL-MCNC: 0.8 NG/ML (ref 0.9–2)
ENTEROTOXIGEN E COLI, DNA: NEGATIVE
EOSINOPHIL # BLD: 0.2 K/UL (ref 0–0.4)
EOSINOPHIL NFR BLD: 1 % (ref 0–7)
ERYTHROCYTE [DISTWIDTH] IN BLOOD BY AUTOMATED COUNT: 21.2 % (ref 11.5–14.5)
G LAMBLIA AG STL QL: NEGATIVE
GLOBULIN SER CALC-MCNC: 4.6 G/DL (ref 2–4)
GLUCOSE SERPL-MCNC: 102 MG/DL (ref 65–100)
HCT VFR BLD AUTO: 30.4 % (ref 35–47)
HGB BLD-MCNC: 9.6 G/DL (ref 11.5–16)
IMM GRANULOCYTES # BLD AUTO: 0 K/UL (ref 0–0.04)
IMM GRANULOCYTES NFR BLD AUTO: 0 % (ref 0–0.5)
LYMPHOCYTES # BLD: 2.3 K/UL (ref 0.8–3.5)
LYMPHOCYTES NFR BLD: 14 % (ref 12–49)
MAGNESIUM SERPL-MCNC: 1.8 MG/DL (ref 1.6–2.4)
MCH RBC QN AUTO: 26.5 PG (ref 26–34)
MCHC RBC AUTO-ENTMCNC: 31.6 G/DL (ref 30–36.5)
MCV RBC AUTO: 84 FL (ref 80–99)
MONOCYTES # BLD: 0.7 K/UL (ref 0–1)
MONOCYTES NFR BLD: 4 % (ref 5–13)
MYELOCYTES NFR BLD MANUAL: 2 %
NEUTS BAND NFR BLD MANUAL: 2 %
NEUTS SEG # BLD: 13.1 K/UL (ref 1.8–8)
NEUTS SEG NFR BLD: 77 % (ref 32–75)
NRBC # BLD: 0.02 K/UL (ref 0–0.01)
NRBC BLD-RTO: 0.1 PER 100 WBC
P SHIGELLOIDES DNA STL QL NAA+PROBE: NEGATIVE
P-R INTERVAL, ECG05: 214 MS
PLATELET # BLD AUTO: 262 K/UL (ref 150–400)
PMV BLD AUTO: 9.9 FL (ref 8.9–12.9)
POTASSIUM SERPL-SCNC: 3 MMOL/L (ref 3.5–5.1)
PROCALCITONIN SERPL-MCNC: 0.31 NG/ML
PROT SERPL-MCNC: 6.9 G/DL (ref 6.4–8.2)
Q-T INTERVAL, ECG07: 392 MS
QRS DURATION, ECG06: 86 MS
QTC CALCULATION (BEZET), ECG08: 437 MS
RBC # BLD AUTO: 3.62 M/UL (ref 3.8–5.2)
RBC MORPH BLD: ABNORMAL
SALMONELLA SPECIES, DNA: NEGATIVE
SERVICE CMNT-IMP: NORMAL
SHIGA TOXIN PRODUCING, DNA: NEGATIVE
SHIGELLA SP+EIEC IPAH STL QL NAA+PROBE: NEGATIVE
SODIUM SERPL-SCNC: 140 MMOL/L (ref 136–145)
VENTRICULAR RATE, ECG03: 75 BPM
VIBRIO SPECIES, DNA: NEGATIVE
WBC # BLD AUTO: 16.6 K/UL (ref 3.6–11)
Y. ENTEROCOLITICA, DNA: NEGATIVE

## 2021-06-21 PROCEDURE — 97535 SELF CARE MNGMENT TRAINING: CPT | Performed by: OCCUPATIONAL THERAPIST

## 2021-06-21 PROCEDURE — 74011000250 HC RX REV CODE- 250: Performed by: INTERNAL MEDICINE

## 2021-06-21 PROCEDURE — 71045 X-RAY EXAM CHEST 1 VIEW: CPT

## 2021-06-21 PROCEDURE — 94640 AIRWAY INHALATION TREATMENT: CPT

## 2021-06-21 PROCEDURE — 5A2204Z RESTORATION OF CARDIAC RHYTHM, SINGLE: ICD-10-PCS | Performed by: INTERNAL MEDICINE

## 2021-06-21 PROCEDURE — 83880 ASSAY OF NATRIURETIC PEPTIDE: CPT

## 2021-06-21 PROCEDURE — 65660000001 HC RM ICU INTERMED STEPDOWN

## 2021-06-21 PROCEDURE — 74011250637 HC RX REV CODE- 250/637: Performed by: INTERNAL MEDICINE

## 2021-06-21 PROCEDURE — 80053 COMPREHEN METABOLIC PANEL: CPT

## 2021-06-21 PROCEDURE — 89055 LEUKOCYTE ASSESSMENT FECAL: CPT

## 2021-06-21 PROCEDURE — 99233 SBSQ HOSP IP/OBS HIGH 50: CPT | Performed by: INTERNAL MEDICINE

## 2021-06-21 PROCEDURE — 80162 ASSAY OF DIGOXIN TOTAL: CPT

## 2021-06-21 PROCEDURE — 74011250637 HC RX REV CODE- 250/637: Performed by: GENERAL ACUTE CARE HOSPITAL

## 2021-06-21 PROCEDURE — 99152 MOD SED SAME PHYS/QHP 5/>YRS: CPT | Performed by: INTERNAL MEDICINE

## 2021-06-21 PROCEDURE — 74011000258 HC RX REV CODE- 258: Performed by: GENERAL ACUTE CARE HOSPITAL

## 2021-06-21 PROCEDURE — 87329 GIARDIA AG IA: CPT

## 2021-06-21 PROCEDURE — 93320 DOPPLER ECHO COMPLETE: CPT | Performed by: INTERNAL MEDICINE

## 2021-06-21 PROCEDURE — 93005 ELECTROCARDIOGRAM TRACING: CPT

## 2021-06-21 PROCEDURE — 92960 CARDIOVERSION ELECTRIC EXT: CPT | Performed by: INTERNAL MEDICINE

## 2021-06-21 PROCEDURE — 83735 ASSAY OF MAGNESIUM: CPT

## 2021-06-21 PROCEDURE — 74011250636 HC RX REV CODE- 250/636: Performed by: INTERNAL MEDICINE

## 2021-06-21 PROCEDURE — 97165 OT EVAL LOW COMPLEX 30 MIN: CPT | Performed by: OCCUPATIONAL THERAPIST

## 2021-06-21 PROCEDURE — 93312 ECHO TRANSESOPHAGEAL: CPT | Performed by: INTERNAL MEDICINE

## 2021-06-21 PROCEDURE — 74011250636 HC RX REV CODE- 250/636: Performed by: GENERAL ACUTE CARE HOSPITAL

## 2021-06-21 PROCEDURE — 77010033678 HC OXYGEN DAILY

## 2021-06-21 PROCEDURE — 99153 MOD SED SAME PHYS/QHP EA: CPT | Performed by: INTERNAL MEDICINE

## 2021-06-21 PROCEDURE — 87506 IADNA-DNA/RNA PROBE TQ 6-11: CPT

## 2021-06-21 PROCEDURE — 84145 PROCALCITONIN (PCT): CPT

## 2021-06-21 PROCEDURE — 93312 ECHO TRANSESOPHAGEAL: CPT

## 2021-06-21 PROCEDURE — 77030018729 HC ELECTRD DEFIB PAD CARD -B: Performed by: INTERNAL MEDICINE

## 2021-06-21 PROCEDURE — 93325 DOPPLER ECHO COLOR FLOW MAPG: CPT | Performed by: INTERNAL MEDICINE

## 2021-06-21 PROCEDURE — 85025 COMPLETE CBC W/AUTO DIFF WBC: CPT

## 2021-06-21 PROCEDURE — 36415 COLL VENOUS BLD VENIPUNCTURE: CPT

## 2021-06-21 RX ORDER — LIDOCAINE HYDROCHLORIDE 20 MG/ML
SOLUTION OROPHARYNGEAL AS NEEDED
Status: DISCONTINUED | OUTPATIENT
Start: 2021-06-21 | End: 2021-06-21 | Stop reason: HOSPADM

## 2021-06-21 RX ORDER — FENTANYL CITRATE 50 UG/ML
INJECTION, SOLUTION INTRAMUSCULAR; INTRAVENOUS AS NEEDED
Status: DISCONTINUED | OUTPATIENT
Start: 2021-06-21 | End: 2021-06-21 | Stop reason: HOSPADM

## 2021-06-21 RX ORDER — ALBUTEROL SULFATE 0.83 MG/ML
2.5 SOLUTION RESPIRATORY (INHALATION)
Status: DISCONTINUED | OUTPATIENT
Start: 2021-06-21 | End: 2021-06-22 | Stop reason: HOSPADM

## 2021-06-21 RX ORDER — FUROSEMIDE 10 MG/ML
40 INJECTION INTRAMUSCULAR; INTRAVENOUS DAILY
Status: DISCONTINUED | OUTPATIENT
Start: 2021-06-22 | End: 2021-06-22 | Stop reason: HOSPADM

## 2021-06-21 RX ORDER — MIDAZOLAM HYDROCHLORIDE 1 MG/ML
INJECTION, SOLUTION INTRAMUSCULAR; INTRAVENOUS AS NEEDED
Status: DISCONTINUED | OUTPATIENT
Start: 2021-06-21 | End: 2021-06-21 | Stop reason: HOSPADM

## 2021-06-21 RX ADMIN — POTASSIUM BICARBONATE 40 MEQ: 782 TABLET, EFFERVESCENT ORAL at 17:33

## 2021-06-21 RX ADMIN — METRONIDAZOLE 500 MG: 500 INJECTION, SOLUTION INTRAVENOUS at 22:10

## 2021-06-21 RX ADMIN — Medication 10 ML: at 06:14

## 2021-06-21 RX ADMIN — ACETAMINOPHEN 650 MG: 325 TABLET ORAL at 22:50

## 2021-06-21 RX ADMIN — GABAPENTIN 100 MG: 100 CAPSULE ORAL at 17:32

## 2021-06-21 RX ADMIN — ALBUTEROL SULFATE 2.5 MG: 2.5 SOLUTION RESPIRATORY (INHALATION) at 07:22

## 2021-06-21 RX ADMIN — DIGOXIN 0.12 MG: 125 TABLET ORAL at 08:08

## 2021-06-21 RX ADMIN — CEFEPIME HYDROCHLORIDE 2 G: 2 INJECTION, POWDER, FOR SOLUTION INTRAVENOUS at 08:06

## 2021-06-21 RX ADMIN — GABAPENTIN 100 MG: 100 CAPSULE ORAL at 22:03

## 2021-06-21 RX ADMIN — APIXABAN 5 MG: 5 TABLET, FILM COATED ORAL at 22:03

## 2021-06-21 RX ADMIN — DILTIAZEM HYDROCHLORIDE 90 MG: 30 TABLET, FILM COATED ORAL at 22:03

## 2021-06-21 RX ADMIN — METOPROLOL TARTRATE 25 MG: 25 TABLET, FILM COATED ORAL at 22:03

## 2021-06-21 RX ADMIN — SERTRALINE 100 MG: 50 TABLET, FILM COATED ORAL at 08:07

## 2021-06-21 RX ADMIN — Medication 10 ML: at 22:05

## 2021-06-21 RX ADMIN — CEFEPIME HYDROCHLORIDE 2 G: 2 INJECTION, POWDER, FOR SOLUTION INTRAVENOUS at 22:05

## 2021-06-21 RX ADMIN — METOPROLOL TARTRATE 25 MG: 25 TABLET, FILM COATED ORAL at 08:07

## 2021-06-21 RX ADMIN — METRONIDAZOLE 500 MG: 500 INJECTION, SOLUTION INTRAVENOUS at 14:52

## 2021-06-21 RX ADMIN — GABAPENTIN 100 MG: 100 CAPSULE ORAL at 08:08

## 2021-06-21 RX ADMIN — METRONIDAZOLE 500 MG: 500 INJECTION, SOLUTION INTRAVENOUS at 06:07

## 2021-06-21 RX ADMIN — ALBUTEROL SULFATE 2.5 MG: 2.5 SOLUTION RESPIRATORY (INHALATION) at 14:11

## 2021-06-21 RX ADMIN — DILTIAZEM HYDROCHLORIDE 90 MG: 30 TABLET, FILM COATED ORAL at 08:07

## 2021-06-21 RX ADMIN — DILTIAZEM HYDROCHLORIDE 90 MG: 30 TABLET, FILM COATED ORAL at 13:44

## 2021-06-21 RX ADMIN — PANTOPRAZOLE SODIUM 40 MG: 40 TABLET, DELAYED RELEASE ORAL at 08:07

## 2021-06-21 RX ADMIN — DILTIAZEM HYDROCHLORIDE 90 MG: 30 TABLET, FILM COATED ORAL at 17:32

## 2021-06-21 RX ADMIN — ALBUTEROL SULFATE 2.5 MG: 2.5 SOLUTION RESPIRATORY (INHALATION) at 19:53

## 2021-06-21 RX ADMIN — POTASSIUM BICARBONATE 40 MEQ: 782 TABLET, EFFERVESCENT ORAL at 13:43

## 2021-06-21 RX ADMIN — Medication 5 ML: at 14:00

## 2021-06-21 RX ADMIN — APIXABAN 5 MG: 5 TABLET, FILM COATED ORAL at 08:08

## 2021-06-21 NOTE — PROGRESS NOTES
Reason for Admission:                       RUR Score:         13            Plan for utilizing home health:     No needs     PCP: First and Last name:  Iris Patricia NP     Name of Practice:    Are you a current patient: Yes/No:    Approximate date of last visit:    Can you participate in a virtual visit with your PCP:                     Current Advanced Directive/Advance Care Plan: Full Code      Transition of Care Plan:                    I met with pt who states sh lives in 2 story home, uses a cane; spouse is legal next of kin, has [facility] medicare, and anticipates JASMINA//DCC when stable with possible d/c to home without dc needs on Wednesday. Will follow    Care Management Interventions  PCP Verified by CM:  Yes  Transition of Care Consult (CM Consult): Discharge Planning  MyChart Signup: No  Discharge Durable Medical Equipment: No  Physical Therapy Consult: No  Occupational Therapy Consult: No  Speech Therapy Consult: No  Current Support Network: Lives with Spouse  Confirm Follow Up Transport: Family  The Plan for Transition of Care is Related to the Following Treatment Goals : dc plans  The Patient and/or Patient Representative was Provided with a Choice of Provider and Agrees with the Discharge Plan?: Yes  Name of the Patient Representative Who was Provided with a Choice of Provider and Agrees with the Discharge Plan: pt  Freedom of Choice List was Provided with Basic Dialogue that Supports the Patient's Individualized Plan of Care/Goals, Treatment Preferences and Shares the Quality Data Associated with the Providers?: Yes  Discharge Location  Discharge Placement: Home

## 2021-06-21 NOTE — PROGRESS NOTES
TRANSFER - OUT REPORT:    Verbal report given to [facility], RN(name) on Merissa Kaye  being transferred to room 2221(unit) for ordered procedure       Report consisted of patients Situation, Background, Assessment and   Recommendations(SBAR). Information from the following report(s) Procedure Summary and MAR was reviewed with the receiving nurse. Lines:   Peripheral IV 06/19/21 Left; Outer Forearm (Active)   Site Assessment Clean, dry, & intact 06/21/21 0727   Phlebitis Assessment 0 06/21/21 0727   Infiltration Assessment 0 06/21/21 0727   Dressing Status Clean, dry, & intact 06/21/21 0727   Dressing Type Transparent 06/21/21 0727   Hub Color/Line Status Flushed 06/21/21 0727   Action Taken Open ports on tubing capped;Dressing reinforced 06/21/21 0250   Alcohol Cap Used Yes 06/21/21 0250       Peripheral IV 06/19/21 Left; Inner Forearm (Active)   Site Assessment Clean, dry, & intact 06/21/21 0727   Phlebitis Assessment 0 06/21/21 0727   Infiltration Assessment 0 06/21/21 0727   Dressing Status Clean, dry, & intact 06/21/21 0727   Dressing Type Transparent 06/21/21 0727   Hub Color/Line Status Flushed 06/21/21 9283   Action Taken Dressing reinforced; Open ports on tubing capped 06/21/21 0250   Alcohol Cap Used Yes 06/21/21 0250        Opportunity for questions and clarification was provided.       Patient transported with:   O2 @ 2 liters

## 2021-06-21 NOTE — PROGRESS NOTES
Hospitalist Progress Note    NAME: Suly Fay   :  1960   MRN:  [id number]     Admit date: 2021    Today's date: 21    PCP: CAIO Kearney M.D. Cell [phone]    Anticipated discharge date: 2021    Barriers:  Cardioversion of atrial fib    Assessment / Plan:    Sepsis  POA WBC 18.2,   Lactic acidosis lactate 2.18  Probable gastroenteritis POA  Diarrhea with associated N/V POA  Presents with RUQ tenderness, associated N/V  Less tender in RUQ  CT w/o contrast reported inflammatory stranding in the right upper quadrant extending       anterolaterally from the second portion of the duodenum and the ascending colon. Gallstones on US, HIDA with no obstruction or evidence of cholecystitis  Normal amylase and lipase  UA with 0-4 WBC, 0-5 RBC  CT chest with minimal atelectasis, No ASD  V/Q scan was low prob  EGD with Dr Kendra Olvera:     Esophagus:                  + Normal mucosa throughout the esophagus without any erosions, bleeding. Z line normal at 40 cm from incisors.     Stomach:                   + There was streaking erythema in the gastric body/antrum with small raised                    nodule x 2 prepyloric s/p Bxs. No blood in UGI tract,  No ulcers seen.      Duodenum:                   + Scope advanced past second portion to the 3rd portion of duodenum. Normal mucosa throughout the SB, normal villi                       no ulcers/erosions seen in duodenum.   Suspected gastroenteritis         No duodenitis or PUD         Cholelithiasis but no cholecystitis  IV empiric cefepime and flagyl x 7 days, complete course, change to PO at discharge  Starting getting OOB, wean O2    Acute kidney injury POA Admit BUN/creat 33 and 2.05, improved  Hypokalemia K 2.4 POA low again, will supplement  Holding diuretics and losartan  IVF with resolution of MINDY, stop IVF  Avoid nephrotoxic meds  Serial labs  No hydronephrosis on CT scans     New Atrial Flutter POA  Dizziness   Denies prior atrial fib or flutter  IVF  Dilt drip being transitioned to PO  Cardio consult  TSH wnl  ECHO in AM  Started on eliquis  Plan for cardioversion monday     Acute hypoxic resp failure 2/2 bibasilar ATX  VQ scan neg  CT chest w mild bibasilar ATX  Cont pulse ox    Essential HTN POA  Hyperlipidemia POA  PO cardizem, metoprolol  Hold statin given elevated LFTs     Code Status: full   Surrogate Decision Maker:    DVT Prophylaxis: SCDs  GI Prophylaxis: not indicated  Baseline: independent        Subjective:     Chief Complaint / Reason for Physician Visit f/u abdominal pain  \"I feel better\". Discussed with RN events overnight. Minimal tenderness in RUQ, no further N/V, some diarrhea  Still in atrial fib, planning for JASMINA and cardioversion    Review of Systems:  Symptom Y/N Comments  Symptom Y/N Comments   Fever/Chills n   Chest Pain n    Poor Appetite    Edema     Cough n   Abdominal Pain y minimal   Sputum    Joint Pain     SOB/BLACKMAN n   Headache     Nausea/vomit n   Tolerating PT/OT     Diarrhea y   Tolerating Diet y    Constipation    Other       Could NOT obtain due to:      Objective:     VITALS:   Last 24hrs VS reviewed since prior progress note.  Most recent are:  Patient Vitals for the past 24 hrs:   Temp Pulse Resp BP SpO2   06/20/21 2218 98.9 °F (37.2 °C) (!) 108 20 (!) 141/77 94 %   06/20/21 2014     97 %   06/20/21 2000 98.6 °F (37 °C) 96 22 124/81 96 %   06/20/21 1659  (!) 102  (!) 145/83    06/20/21 1552 (!) 100.6 °F (38.1 °C)       06/20/21 1454     90 %   06/20/21 1450 99.3 °F (37.4 °C) 97 20 121/76    06/20/21 1249  87  112/88    06/20/21 1037 98.2 °F (36.8 °C) 97 20 127/87 100 %   06/20/21 0747  96 20 (!) 122/104 96 %   06/20/21 0406 98.1 °F (36.7 °C) 80 20 (!) 147/91 100 %       Intake/Output Summary (Last 24 hours) at 6/20/2021 2330  Last data filed at 6/20/2021 0406  Gross per 24 hour Intake 100 ml   Output    Net 100 ml        Wt Readings from Last 12 Encounters:   06/19/21 128.4 kg (283 lb 1.1 oz)   08/21/20 124.7 kg (275 lb)   04/15/20 124.7 kg (275 lb)   02/13/19 131.1 kg (289 lb)   08/28/18 134.3 kg (296 lb)   08/15/18 134.3 kg (296 lb)   08/02/18 134.3 kg (296 lb)   07/05/18 130.4 kg (287 lb 8 oz)   05/16/18 130.2 kg (287 lb)   11/04/16 134.7 kg (296 lb 15.4 oz)   04/27/15 134.7 kg (297 lb)   03/25/11 126.1 kg (278 lb)       PHYSICAL EXAM:    I had a face to face encounter and independently examined this patient on 06/20/21 as outlined below:    General: WD, WN. Alert, cooperative, no acute distress    EENT:  PERRL. Anicteric sclerae. MMM  Resp:  CTA bilaterally, no wheezing or rales. No accessory muscle use  CV:  Irregular  rhythm,  No edema  GI:  Soft, Non distended, mild tender in RUQ. +Bowel sounds, no rebound  Neurologic:  Alert and oriented X 3, normal speech, non focal motor exam  Psych:   Good insight. Not anxious nor agitated  Skin:  No rashes. No jaundice    Reviewed most current lab test results and cultures  YES  Reviewed most current radiology test results   YES  Review and summation of old records today    NO  Reviewed patient's current orders and MAR    YES  PMH/ reviewed - no change compared to H&P  ________________________________________________________________________  Care Plan discussed with:    Comments   Patient x    Family      RN x    Care Manager     Consultant                        Multidiciplinary team rounds were held today with , nursing, pharmacist and clinical coordinator. Patient's plan of care was discussed; medications were reviewed and discharge planning was addressed.      ________________________________________________________________________      Comments   >50% of visit spent in counseling and coordination of care     ________________________________________________________________________  [place] Ferm[place], MD     Procedures: see electronic medical records for all procedures/Xrays and details which were not copied into this note but were reviewed prior to creation of Plan. LABS:  I reviewed today's most current labs and imaging studies.   Pertinent labs include:  Recent Labs     06/19/21  0930   WBC 11.9*   HGB 9.5*   HCT 30.8*        Recent Labs     06/19/21  0930 06/18/21  0142    134*   K 2.9* 4.3    108   CO2 27 21   * 102*   BUN 9 15   CREA 0.77 0.87   CA 8.6 8.2*   MG  --  2.3   ALB  --  2.2*   TBILI  --  0.6   ALT  --  49   INR  --  1.0

## 2021-06-21 NOTE — PROGRESS NOTES
[address], [place], [address] [facility]  [phone]      Cardiology Progress Note      6/21/2021 9:14 AM    Admit Date: 6/16/2021    Admit Diagnosis:   Atrial fibrillation with RVR ([address] [I48.91]    Subjective:     Angeline Elena is s/p successful JASMINA and cardioversion this morning, remains in SR. Still very sleepy.      Visit Vitals  BP (!) 141/112 (BP 1 Location: Left leg, BP Patient Position: Semi fowlers)   Pulse 76   Temp 98.6 °F (37 °C)   Resp 22   Ht 5' 6\" (1.676 m)   Wt 320 lb 12.8 oz (145.5 kg)   SpO2 93%   BMI 51.78 kg/m²       Current Facility-Administered Medications   Medication Dose Route Frequency    albuterol (PROVENTIL VENTOLIN) nebulizer solution 2.5 mg  2.5 mg Inhalation Q6H RT    metoprolol tartrate (LOPRESSOR) tablet 25 mg  25 mg Oral Q12H    dilTIAZem IR (CARDIZEM) tablet 90 mg  90 mg Oral QID    apixaban (ELIQUIS) tablet 5 mg  5 mg Oral Q12H    sodium chloride (NS) flush 5-40 mL  5-40 mL IntraVENous Q8H    sodium chloride (NS) flush 5-40 mL  5-40 mL IntraVENous PRN    pantoprazole (PROTONIX) tablet 40 mg  40 mg Oral ACB    gabapentin (NEURONTIN) capsule 100 mg  100 mg Oral TID    sertraline (ZOLOFT) tablet 100 mg  100 mg Oral DAILY    acetaminophen (TYLENOL) tablet 650 mg  650 mg Oral Q6H PRN    Or    acetaminophen (TYLENOL) suppository 650 mg  650 mg Rectal Q6H PRN    polyethylene glycol (MIRALAX) packet 17 g  17 g Oral DAILY PRN    ondansetron (ZOFRAN ODT) tablet 4 mg  4 mg Oral Q8H PRN    Or    ondansetron (ZOFRAN) injection 4 mg  4 mg IntraVENous Q6H PRN    digoxin (LANOXIN) tablet 0.125 mg  0.125 mg Oral DAILY    cefepime (MAXIPIME) 2 g in 0.9% sodium chloride (MBP/ADV) 100 mL MBP  2 g IntraVENous Q12H    metroNIDAZOLE (FLAGYL) IVPB premix 500 mg  500 mg IntraVENous Q8H    morphine injection 1 mg  1 mg IntraVENous Q4H PRN       Objective:      Physical Exam:  General Appearance:  morbidly obese  female in no acute distress  Chest: Clear  Cardiovascular:  irr, irr no murmur. Abdomen:   Soft, non-tender, bowel sounds are active. Extremities: no peripheral edema  Skin:  Warm and dry. Data Review:   Recent Labs     06/19/21  0930   WBC 11.9*   HGB 9.5*   HCT 30.8*        Recent Labs     06/19/21  0930      K 2.9*      CO2 27   *   BUN 9   CREA 0.77   CA 8.6       No results for input(s): TROIQ, CPK, CKMB in the last 72 hours. Intake/Output Summary (Last 24 hours) at 6/21/2021 1020  Last data filed at 6/21/2021 0241  Gross per 24 hour   Intake 250 ml   Output    Net 250 ml        Telemetry: SR    Echo:  · LV: Estimated LVEF is 55 - 60%. Normal cavity size, wall thickness and systolic function (ejection fraction normal). · RV: Mildly dilated right ventricle. · MV: Mild mitral valve regurgitation is present. · PA: Mild pulmonary hypertension. Pulmonary arterial systolic pressure is 50 mmHg. Assessment:     Active Problems:    Obesity, morbid ([address] (5/16/2018)      Atrial fibrillation with RVR ([address] (6/16/2021)      Nausea & vomiting (6/17/2021)      Diarrhea (6/17/2021)      Dehydration (6/17/2021)      Acute kidney injury ([address] (6/17/2021)      Cholelithiasis (6/17/2021)      Arrhythmia (6/18/2021)      HTN (hypertension) (6/18/2021)      Dyslipidemia (6/18/2021)        Plan:     Aflutter/afib:  Successful JASMINA and cardioversion this AM  Continue on Dilt 90mg QID,change to long acting Dilt 360mg today  Continue on metoprolol 25 mgBID  CHADS2 vasc score: 2. Started on Eliquis 5mg BID over the weekend. GI oks. HGB stable. As a [address] witness, she does not accept blood products       Morbid obesity/ suspected TANVI:  · mildly dilated RV and PHTN suspected due to obesity and undiagnosed TANVI  · Rec. outpatient sleep study    Frequent cough which has been present for over a year:  Try nebulizers in case there is a component of asthma.   Evidently lisinopril was stopped for this in the past    Darion Coronel Kassie Camacho

## 2021-06-21 NOTE — PROGRESS NOTES
0700: Bedside shift change report given to [facility] (oncoming nurse) by Sarah Oliveira RN (offgoing nurse). Report included the following information SBAR and Kardex. 0840: Pt going SARAH for tiffany/cardioversion    1030: Patient successfully cardioverted. Back in sinus. VSS. A/o. No complaints at this time Will continue to monitor.  Pt can resume diet at 11am.

## 2021-06-21 NOTE — PROGRESS NOTES
Patient off the floor for cardioversion. Chart review documents two non bloody formed stools overnight.     Macarthur Ganser, PA  06/21/21  8:48 AM

## 2021-06-21 NOTE — PROGRESS NOTES
ADULT PROTOCOL: JET AEROSOL  REASSESSMENT    Patient  Mónica George     62 y.o.   female     6/21/2021  12:03 PM    Breath Sounds Pre Procedure: Right Breath Sounds: Diminished                               Left Breath Sounds: Diminished    Breath Sounds Post Procedure: Right Breath Sounds: Diminished                                 Left Breath Sounds: Diminished    Breathing pattern: Pre procedure Breathing Pattern: Regular          Post procedure Breathing Pattern: Regular    Heart Rate: Pre procedure Pulse: 112           Post procedure Pulse: 112    Resp Rate: Pre procedure Respirations: 18           Post procedure Respirations: 18    Cough: Pre procedure Cough: Non-productive               Post procedure Cough: Non-productive    Oxygen: O2 Device: Nasal cannula        Changed:no    SpO2: Pre procedure SpO2: 94 %                 Post procedure SpO2: 94 %      Nebulizer Therapy: Current medications Aerosolized Medications: Albuterol      Changed: yes changed to q6     Problem List:   Patient Active Problem List   Diagnosis Code    Cancer ([facility] C80.1    Plantar fasciitis M72.2    Hypovitaminosis D E55.9    Obesity, morbid ([facility] E66.01    Gastroesophageal reflux disease without esophagitis K21.9    Atrial fibrillation with RVR (H[facility]) I48.91    Nausea & vomiting R11.2    Diarrhea R19.7    Dehydration E86.0    Acute kidney injury ([facility] N17.9    Cholelithiasis K80.20    Arrhythmia I49.9    HTN (hypertension) I10    Dyslipidemia E78.5       Respiratory Therapist: Bárbara Thomas

## 2021-06-21 NOTE — PROGRESS NOTES
End of Shift Note    Bedside shift change report given to [facility] (oncoming nurse) by Otilio Grove (offgoing nurse). Report included the following information SBAR    Shift worked:  night     Shift summary and any significant changes:     Pt refusing labwork, states she would like PICC team to collect them. Had two formed and nonbloody stool overnight. Bathed and NPO since midnight for procedure. Concerns for physician to address:        Zone phone for oncoming shift:           Activity:  Activity Level: Up with Assistance  Number times ambulated in hallways past shift: 0  Number of times OOB to chair past shift: 0    Cardiac:   Cardiac Monitoring: Yes      Cardiac Rhythm: Atrial Fib    Access:   Current line(s): PIV     Genitourinary:   Urinary status: voiding    Respiratory:   O2 Device: Nasal cannula  Chronic home O2 use?: NO  Incentive spirometer at bedside: YES     GI:  Last Bowel Movement Date: 06/18/21  Current diet:  DIET ONE TIME MESSAGE  ADULT DIET Regular; GI Lucas (GERD/Peptic Ulcer)  DIET NPO  Passing flatus: YES  Tolerating current diet: YES       Pain Management:   Patient states pain is manageable on current regimen: YES    Skin:  Brandon Score: 19  Interventions: increase time out of bed    Patient Safety:  Fall Score:  Total Score: 3  Interventions: bed/chair alarm  High Fall Risk: Yes    Length of Stay:  Expected LOS: 3d 19h  Actual LOS: [facility]

## 2021-06-21 NOTE — PROGRESS NOTES
Gastroenterology Daily Progress Note   Nataly Escamilla, [place]   for Dr. Randee Holland)   [address] [place] [address] Dr Worley Date: 6/16/2021     F/u N/V/D, abnormal CT    Subjective:       Back from cardioversion and in NSR  Had 2 loose non bloody stools last night but starting to \"thicken up\" per patient  No longer having abd pain, N/V and tolerating diet. Tm 100 yesterday during the day. Afebrile overnight and this AM.  Feels \"100x better\" than when first admitted. On Eliquis, no overt bleeding and occult stool negative, hgb 9.5  S/p negative EGD 6/18/21 and colonoscopy 2/13/19 Findings:   · Her colon is mildly redundant, using a pediatric scope. She needed abdominal pressure to get the scope to the cecum. · TI could not be intubated despite trying. · There are no masses seen. · A small, 4 mm, sessile sigmoid colon polyp was removed with a cold biopsy forceps. Medium sized internal hemorrhoids with anal papillae noted.     Current Facility-Administered Medications   Medication Dose Route Frequency    albuterol (PROVENTIL VENTOLIN) nebulizer solution 2.5 mg  2.5 mg Inhalation Q6H RT    metoprolol tartrate (LOPRESSOR) tablet 25 mg  25 mg Oral Q12H    dilTIAZem IR (CARDIZEM) tablet 90 mg  90 mg Oral QID    apixaban (ELIQUIS) tablet 5 mg  5 mg Oral Q12H    sodium chloride (NS) flush 5-40 mL  5-40 mL IntraVENous Q8H    sodium chloride (NS) flush 5-40 mL  5-40 mL IntraVENous PRN    pantoprazole (PROTONIX) tablet 40 mg  40 mg Oral ACB    gabapentin (NEURONTIN) capsule 100 mg  100 mg Oral TID    sertraline (ZOLOFT) tablet 100 mg  100 mg Oral DAILY    acetaminophen (TYLENOL) tablet 650 mg  650 mg Oral Q6H PRN    Or    acetaminophen (TYLENOL) suppository 650 mg  650 mg Rectal Q6H PRN    polyethylene glycol (MIRALAX) packet 17 g  17 g Oral DAILY PRN    ondansetron (ZOFRAN ODT) tablet 4 mg  4 mg Oral Q8H PRN    Or    ondansetron (ZOFRAN) injection 4 mg  4 mg IntraVENous Q6H PRN    digoxin (LANOXIN) tablet 0.125 mg  0.125 mg Oral DAILY    cefepime (MAXIPIME) 2 g in 0.9% sodium chloride (MBP/ADV) 100 mL MBP  2 g IntraVENous Q12H    metroNIDAZOLE (FLAGYL) IVPB premix 500 mg  500 mg IntraVENous Q8H    morphine injection 1 mg  1 mg IntraVENous Q4H PRN        Objective:     Visit Vitals  /70 (BP 1 Location: Left leg, BP Patient Position: Semi fowlers)   Pulse 74   Temp 97.8 °F (36.6 °C)   Resp 22   Ht 5' 6\" (1.676 m)   Wt 145.5 kg (320 lb 12.8 oz)   SpO2 92%   BMI 51.78 kg/m²   Blood pressure 139/70, pulse 74, temperature 97.8 °F (36.6 °C), resp. rate 22, height 5' 6\" (1.676 m), weight 145.5 kg (320 lb 12.8 oz), SpO2 92 %. No intake/output data recorded. 06/19 1901 - 06/21 0700  In: 987.8 [P.O.:350; I.V.:637.8]  Out: -       Intake/Output Summary (Last 24 hours) at 6/21/2021 1042  Last data filed at 6/21/2021 0241  Gross per 24 hour   Intake 250 ml   Output    Net 250 ml         Physical Exam:       General: obese wf in nad  Chest:  CTA, No rhonchi, rales or rubs.   Heart: S1, S2, RRR  GI: Soft, NT, ND + bowel sounds  Extremities: no edema   CNS: CN II-XII normal.      Labs:       Recent Results (from the past 24 hour(s))   ECHO JASMINA W POSSIBLE CARDIOVERSION    Collection Time: 06/21/21  9:32 AM   Result Value Ref Range    PISA MR Rad 0.52 cm    Mitral Effective Regurgitant Orifice Area 0.16 cm2    MV regurgitant volume 22.40 mL    Mitral Regurgitant PISA Peak Velocity 491.06 cm/s    Mitral Regurgitant Velocity Time Integral 143.39 cm    Triscuspid Valve Regurgitation Peak Gradient 36.41 mmHg    TR Max Velocity 301.71 cm/s   EKG, 12 LEAD, INITIAL    Collection Time: 06/21/21 10:11 AM   Result Value Ref Range    Ventricular Rate 75 BPM    Atrial Rate 75 BPM    P-R Interval 214 ms    QRS Duration 86 ms    Q-T Interval 392 ms    QTC Calculation (Bezet) 437 ms    Calculated P Axis 60 degrees    Calculated R Axis 66 degrees    Calculated T Axis 59 degrees    Diagnosis       Sinus rhythm with 1st degree AV block  Low voltage QRS  Borderline ECG  When compared with ECG of 16-JUN-2021 10:51,  Sinus rhythm has replaced Atrial flutter  Vent. rate has decreased BY  68 BPM  ST no longer depressed in Lateral leads  Nonspecific T wave abnormality no longer evident in Lateral leads     LABRCNT(wbc:2,hgb:2,hct:2,plt:2,)  Recent Labs     06/19/21  0930      K 2.9*      CO2 27   BUN 9   CREA 0.77   *   CA 8.6   LABRCNT(sgot:3,gpt:3,ap:3,tbiL:3,TP:3,ALB:3,GLOB:3,ggt:3,aml:3,amyp:3,lpse:3,hlpse:3)No results for input(s): INR, PTP, APTT, INREXT in the last 72 hours. No results for input(s): AP, TBIL, TP, ALB, GLOB, GGT, AML, LPSE in the last 72 hours. No lab exists for component: SGOT, GPT, AMYP, HLPSEBRIEFLAB(B12,FOL,FOLAT,RBCF)No results found for: FOL, RBCFLABRCNT(CPK:3,CpKMB:3,ckndx:3,troiq:3)No components found for: GLPOCBRIEFLAB(CHOL,CHOLX,CHOLP,CHLST,CHOLV,HDL,HDLC,HDLP,LDL,DLDL,LDLC,DLDLP,TGL,TGLX,TRIGL,TRIGP,CHHD,CHHDX)No results for input(s): PH, PCO2, PO2 in the last 72 hours. LABRCNT(CPK:3,CpKMB:3,ckndx:3,troiq:3)ZAHEER Mckeon  No results for input(s): CPK, CKNDX, TROIQ in the last 72 hours. No lab exists for component: CPREBECABMZAHEER [place]      Problem List:     Active Problems:    Obesity, morbid ([facility] (5/16/2018)      Atrial fibrillation with RVR ([facility] (6/16/2021)      Nausea & vomiting (6/17/2021)      Diarrhea (6/17/2021)      Dehydration (6/17/2021)      Acute kidney injury ([facility] (6/17/2021)      Cholelithiasis (6/17/2021)      Arrhythmia (6/18/2021)      HTN (hypertension) (6/18/2021)      Dyslipidemia (6/18/2021)        Impression:  N/V, abd pain - resolved  Abnormal CT: There is inflammatory stranding in the right upper quadrant extending  anterolaterally from the second portion of the duodenum to the ascending colon. This may be due to duodenal ulcer/duodenitis. s/p negative EGD  Diarrhea  Afib s/p cardioversion with anticoagulation         Plan:  Follow stool studies. Continue metronidazole. Overall, much improved. Suspect she had infectious gastroenteritis that is improving. Trend hgb. No overt bleeding, on Eliquis. No plans for colonoscopy at this time. ZAHEER Mendoza  10:49 AM   6/21/2021 [address] [facility], [address] [place] [address]  [address] [id number]  [address] [address] [place]: [phone]    I have examined the patient. I have reviewed the chart and agree with the documentation recorded by the LACEY, including the assessment, treatment plan, and disposition. Patient seen and examined by me. I personally performed all components of the history, physical, and medical decision making and agree with the assessment and plan with minor modifications as noted. Exam:  Alert and awake. A little sleepy after cardioverision  HEENT AT  GI: soft w/ normal bowel sounds,large    Plan:  I spoke w/ her RN and suggest completing stool analysis as was suggested on initial consultation. Her diarrhea is slowing down which is reassuring. We will treat this as likely infectious gastroenteritis. EGD did not show any DU. Sameer Daphne Cummins MD  [facility])

## 2021-06-21 NOTE — PROGRESS NOTES
Problem: Falls - Risk of  Goal: *Absence of Falls  Description: Document Stevie Quintana Fall Risk and appropriate interventions in the flowsheet.   Outcome: Progressing Towards Goal  Note: Fall Risk Interventions:  Mobility Interventions: Bed/chair exit alarm         Medication Interventions: Assess postural VS orthostatic hypotension, Bed/chair exit alarm    Elimination Interventions: Bed/chair exit alarm, Call light in reach    History of Falls Interventions: Bed/chair exit alarm         Problem: Patient Education: Go to Patient Education Activity  Goal: Patient/Family Education  Outcome: Progressing Towards Goal     Problem: Patient Education: Go to Patient Education Activity  Goal: Patient/Family Education  Outcome: Progressing Towards Goal     Problem: Afib Pathway: Day 1  Goal: Off Pathway (Use only if patient is Off Pathway)  Outcome: Progressing Towards Goal  Goal: Activity/Safety  Outcome: Progressing Towards Goal  Goal: Consults, if ordered  Outcome: Progressing Towards Goal  Goal: Diagnostic Test/Procedures  Outcome: Progressing Towards Goal  Goal: Nutrition/Diet  Outcome: Progressing Towards Goal  Goal: Discharge Planning  Outcome: Progressing Towards Goal  Goal: Medications  Outcome: Progressing Towards Goal  Goal: Respiratory  Outcome: Progressing Towards Goal  Goal: Treatments/Interventions/Procedures  Outcome: Progressing Towards Goal  Goal: Psychosocial  Outcome: Progressing Towards Goal  Goal: *Optimal pain control at patient's stated goal  Outcome: Progressing Towards Goal  Goal: *Hemodynamically stable  Outcome: Progressing Towards Goal  Goal: *Stable cardiac rhythm  Outcome: Progressing Towards Goal  Goal: *Lungs clear or at baseline  Outcome: Progressing Towards Goal  Goal: *Labs within defined limits  Outcome: Progressing Towards Goal  Goal: *Describes available resources and support systems  Outcome: Progressing Towards Goal     Problem: Afib Pathway: Day 2  Goal: Off Pathway (Use only if patient is Off Pathway)  Outcome: Progressing Towards Goal  Goal: Activity/Safety  Outcome: Progressing Towards Goal  Goal: Consults, if ordered  Outcome: Progressing Towards Goal  Goal: Diagnostic Test/Procedures  Outcome: Progressing Towards Goal  Goal: Nutrition/Diet  Outcome: Progressing Towards Goal  Goal: Discharge Planning  Outcome: Progressing Towards Goal  Goal: Medications  Outcome: Progressing Towards Goal  Goal: Respiratory  Outcome: Progressing Towards Goal  Goal: Treatments/Interventions/Procedures  Outcome: Progressing Towards Goal  Goal: Psychosocial  Outcome: Progressing Towards Goal  Goal: *Hemodynamically stable  Outcome: Progressing Towards Goal  Goal: *Optimal pain control at patient's stated goal  Outcome: Progressing Towards Goal  Goal: *Stable cardiac rhythm  Outcome: Progressing Towards Goal  Goal: *Lungs clear or at baseline  Outcome: Progressing Towards Goal  Goal: *Describes available resources and support systems  Outcome: Progressing Towards Goal     Problem: Afib Pathway: Day 3  Goal: Off Pathway (Use only if patient is Off Pathway)  Outcome: Progressing Towards Goal  Goal: Activity/Safety  Outcome: Progressing Towards Goal  Goal: Diagnostic Test/Procedures  Outcome: Progressing Towards Goal  Goal: Nutrition/Diet  Outcome: Progressing Towards Goal  Goal: Discharge Planning  Outcome: Progressing Towards Goal  Goal: Medications  Outcome: Progressing Towards Goal  Goal: Respiratory  Outcome: Progressing Towards Goal  Goal: Treatments/Interventions/Procedures  Outcome: Progressing Towards Goal  Goal: Psychosocial  Outcome: Progressing Towards Goal     Problem: Afib: Discharge Outcomes (not in CAT)  Goal: *Hemodynamically stable  Outcome: Progressing Towards Goal  Goal: *Stable cardiac rhythm  Outcome: Progressing Towards Goal  Goal: *Lungs clear or at baseline  Outcome: Progressing Towards Goal  Goal: *Optimal pain control at patient's stated goal  Outcome: Progressing Towards Goal  Goal: *Identifies cardiac risk factors  Outcome: Progressing Towards Goal  Goal: *Verbalizes home exercise program, activity guidelines, cardiac precautions  Outcome: Progressing Towards Goal  Goal: *Verbalizes understanding and describes prescribed diet  Outcome: Progressing Towards Goal  Goal: *Verbalizes understanding and describes medication purposes and frequencies  Outcome: Progressing Towards Goal  Goal: *Anxiety reduced or absent  Outcome: Progressing Towards Goal  Goal: *Understands and describes signs and symptoms to report to providers(Stroke Metric)  Outcome: Progressing Towards Goal  Goal: *Describes follow-up/return visits to physicians  Outcome: Progressing Towards Goal  Goal: *Describes available resources and support systems  Outcome: Progressing Towards Goal  Goal: *Influenza immunization  Outcome: Progressing Towards Goal  Goal: *Pneumococcal immunization  Outcome: Progressing Towards Goal  Goal: *Describes smoking cessation resources  Outcome: Progressing Towards Goal     Problem: Pressure Injury - Risk of  Goal: *Prevention of pressure injury  Description: Document Brandon Scale and appropriate interventions in the flowsheet.   Outcome: Progressing Towards Goal     Problem: Patient Education: Go to Patient Education Activity  Goal: Patient/Family Education  Outcome: Progressing Towards Goal     Problem: Breathing Pattern - Ineffective  Goal: *Absence of hypoxia  Outcome: Progressing Towards Goal  Goal: *Use of effective breathing techniques  Outcome: Progressing Towards Goal

## 2021-06-21 NOTE — PROGRESS NOTES
OCCUPATIONAL THERAPY EVALUATION/DISCHARGE  Patient: Lis Montelongo (63 y.o. female)  Date: 2021  Primary Diagnosis: Atrial fibrillation with RVR ([address] [I48.91]  Procedure(s) (LRB):  EP CARDIOVERSION (N/A) Day of Surgery   Precautions: None       ASSESSMENT  Based on the objective data described below, the patient presents at her independent baseline for ADLs and functional mobility. Some SOB noted with activity and patient desaturating to 87% on RA, but her activity tolerance was good overall. Patient recovered quickly to 90% on RA when using pursed lip breathing. All other VSS with activity. She was left on 2L NC saturating at 95% at rest, up in chair. Good overalls strength and balance noted for the performance of functional activity. At this time she is without further OT needs, acutely and after discharge. Functional Outcome Measure: The patient scored 100/100 on the Barthel Index outcome measure which is indicative of a 0% decline in function. PLAN :  Recommendation for discharge: (in order for the patient to meet his/her long term goals)  No skilled occupational therapy/ follow up rehabilitation needs identified at this time. Equipment recommendations for successful discharge: may need home oxygen pending progress       OBJECTIVE DATA SUMMARY:   HISTORY:   Past Medical History:   Diagnosis Date    Arrhythmia 2021    Cancer [facility]) 1986    cervical    Dyslipidemia 2021    Fibromyalgia     GERD (gastroesophageal reflux disease)     HTN (hypertension) 2021     Past Surgical History:   Procedure Laterality Date    COLONOSCOPY N/A 2019    COLONOSCOPY performed by Sukhdeep Onofre MD at [facility] ENDOSCOPY    HX  SECTION      HX GYN      cone resection       Prior Level of Function/Environment/Context: Independent with ADLs, IADLs and ambulation. She is not on supplemental oxygen.    Expanded or extensive additional review of patient history:   [address] [place] [address] Environment: Private residence  # Steps to Enter: 3  Rails to Enter: Yes  Hand Rails : Left  One/Two Story Residence: Two story  # of Interior Steps: 5  Interior Rails: Left  Living Alone: No  Support Systems: Spouse/Significant Other/Partner  Patient Expects to be Discharged toF Cor[de-identified]ration  Current DME Used/Available at Home: Cane, straight    Hand dominance: Right    EXAMINATION OF PERFORMANCE DEFICITS:  Cognitive/Behavioral Status:  Neurologic State: Alert  Orientation Level: Oriented X4  Cognition: Appropriate decision making; Appropriate for age attention/concentration; Appropriate safety awareness; Follows commands        Safety/Judgement: Awareness of environment; Insight into deficits    Hearing: Auditory  Auditory Impairment: None    Vision/Perceptual:    Acuity: Within Defined Limits    Corrective Lenses: Reading glasses    Range of Motion:  AROM: Within functional limits    Strength:  Strength: Within functional limits  Coordination:  Coordination: Within functional limits            Tone & Sensation:  Tone: Normal  Sensation: Intact    Balance:  Sitting: Intact  Standing: Intact    Functional Mobility and Transfers for ADLs:  Bed Mobility:  Rolling: Independent  Supine to Sit: Independent  Scooting: Independent    Transfers:  Sit to Stand: Independent  Stand to Sit: Independent  Bed to Chair: Independent  Bathroom Mobility: Independent  Toilet Transfer : Independent    ADL Assessment:  Feeding: Independent    Oral Facial Hygiene/Grooming: Independent    Bathing: Independent    Upper Body Dressing: Independent    Lower Body Dressing: Independent    Toileting: Independent              Functional Measure:  Barthel Index:    Bathin  Bladder: 10  Bowels: 10  Groomin  Dressing: 10  Feeding: 10  Mobility: 15  Stairs: 10  Toilet Use: 10  Transfer (Bed to Chair and Back): 15  Total: 100/100        The Barthel ADL Index: Guidelines  1.  The index should be used as a record of what a patient does, not as a record of what a patient could do. 2. The main aim is to establish degree of independence from any help, physical or verbal, however minor and for whatever reason. 3. The need for supervision renders the patient not independent. 4. A patient's performance should be established using the best available evidence. Asking the patient, friends/relatives and nurses are the usual sources, but direct observation and common sense are also important. However direct testing is not needed. 5. Usually the patient's performance over the preceding 24-48 hours is important, but occasionally longer periods will be relevant. 6. Middle categories imply that the patient supplies over 50 per cent of the effort. 7. Use of aids to be independent is allowed. Lucrecia Patricia., Barthel, D.W. (1801). Functional evaluation: the Barthel Index. [facility] (14)2. REFUGIO Perera, Ainsley Singleton., Alexia Mccurdy., [place], [address] [facility] (1999). Measuring the change indisability after inpatient rehabilitation; comparison of the responsiveness of the Barthel Index and Functional [place] Measure. Journal of Neurology, Neurosurgery, and Psychiatry, 66(4), 269-269. Yadi Neville, N.J.A, SMITHA Chauhan.CHELSEA, & Rachelle Mark, MLataA. (2004.) Assessment of post-stroke quality of life in cost-effectiveness studies: The usefulness of the Barthel Index and the EuroQoL-5D. Quality of Life Research, 13, 427-46      Pain Rating:  No complaint of pain    Activity Tolerance:   Good, despite SOB  BP supine: 127/72  BP seated: 119/99  BP seated s/p activity: 141/80    HR:90 to 102 during activity    SpO2 95% on 2 L NC at rest   SpO2 92% on RA at rest, desaturating to 87% with activity. After treatment patient left in no apparent distress:    Sitting in chair and Call bell within reach    COMMUNICATION/EDUCATION:   The patients plan of care was discussed with: Registered Nurse.     Thank you for this referral.  Kamari Delaney, OTR/L  Time Calculation: 26 mins

## 2021-06-22 VITALS
DIASTOLIC BLOOD PRESSURE: 87 MMHG | OXYGEN SATURATION: 95 % | WEIGHT: 293 LBS | TEMPERATURE: 97.2 F | SYSTOLIC BLOOD PRESSURE: 118 MMHG | HEART RATE: 81 BPM | HEIGHT: 66 IN | RESPIRATION RATE: 20 BRPM | BODY MASS INDEX: 47.09 KG/M2

## 2021-06-22 PROBLEM — E86.0 DEHYDRATION: Status: RESOLVED | Noted: 2021-06-17 | Resolved: 2021-06-22

## 2021-06-22 PROBLEM — R11.2 NAUSEA & VOMITING: Status: RESOLVED | Noted: 2021-06-17 | Resolved: 2021-06-22

## 2021-06-22 PROBLEM — I49.9 ARRHYTHMIA: Status: RESOLVED | Noted: 2021-06-18 | Resolved: 2021-06-22

## 2021-06-22 PROBLEM — N17.9 ACUTE KIDNEY INJURY (HCC): Status: RESOLVED | Noted: 2021-06-17 | Resolved: 2021-06-22

## 2021-06-22 PROBLEM — I48.91 ATRIAL FIBRILLATION WITH RVR (HCC): Status: RESOLVED | Noted: 2021-06-16 | Resolved: 2021-06-22

## 2021-06-22 PROBLEM — R19.7 DIARRHEA: Status: RESOLVED | Noted: 2021-06-17 | Resolved: 2021-06-22

## 2021-06-22 PROBLEM — K80.20 CHOLELITHIASIS: Status: RESOLVED | Noted: 2021-06-17 | Resolved: 2021-06-22

## 2021-06-22 LAB
ANION GAP SERPL CALC-SCNC: 5 MMOL/L (ref 5–15)
APPEARANCE UR: CLEAR
BACTERIA URNS QL MICRO: NEGATIVE /HPF
BASOPHILS # BLD: 0 K/UL (ref 0–0.1)
BASOPHILS NFR BLD: 0 % (ref 0–1)
BILIRUB UR QL: NEGATIVE
BUN SERPL-MCNC: 9 MG/DL (ref 6–20)
BUN/CREAT SERPL: 12 (ref 12–20)
CALCIUM SERPL-MCNC: 9.2 MG/DL (ref 8.5–10.1)
CHLORIDE SERPL-SCNC: 103 MMOL/L (ref 97–108)
CO2 SERPL-SCNC: 30 MMOL/L (ref 21–32)
COLOR UR: NORMAL
CREAT SERPL-MCNC: 0.73 MG/DL (ref 0.55–1.02)
DIFFERENTIAL METHOD BLD: ABNORMAL
ECHO MV EROA PISA: 0.16 CM2
ECHO MV REGURGITANT RADIUS PISA: 0.52 CM
ECHO MV REGURGITANT VOLUME: 22.4 ML
ECHO MV REGURGITANT VTIA: 143.39 CM
ECHO TV REGURGITANT MAX VELOCITY: 301.71 CM/S
ECHO TV REGURGITANT PEAK GRADIENT: 36.41 MMHG
EOSINOPHIL # BLD: 0.2 K/UL (ref 0–0.4)
EOSINOPHIL NFR BLD: 1 % (ref 0–7)
EPITH CASTS URNS QL MICRO: NORMAL /LPF
ERYTHROCYTE [DISTWIDTH] IN BLOOD BY AUTOMATED COUNT: 21.3 % (ref 11.5–14.5)
GLUCOSE SERPL-MCNC: 105 MG/DL (ref 65–100)
GLUCOSE UR STRIP.AUTO-MCNC: NEGATIVE MG/DL
HCT VFR BLD AUTO: 34.1 % (ref 35–47)
HGB BLD-MCNC: 10.6 G/DL (ref 11.5–16)
HGB UR QL STRIP: NEGATIVE
HYALINE CASTS URNS QL MICRO: NORMAL /LPF (ref 0–5)
IMM GRANULOCYTES # BLD AUTO: 0 K/UL (ref 0–0.04)
IMM GRANULOCYTES NFR BLD AUTO: 0 % (ref 0–0.5)
KETONES UR QL STRIP.AUTO: NEGATIVE MG/DL
LEUKOCYTE ESTERASE UR QL STRIP.AUTO: NEGATIVE
LYMPHOCYTES # BLD: 2.3 K/UL (ref 0.8–3.5)
LYMPHOCYTES NFR BLD: 15 % (ref 12–49)
MCH RBC QN AUTO: 26.6 PG (ref 26–34)
MCHC RBC AUTO-ENTMCNC: 31.1 G/DL (ref 30–36.5)
MCV RBC AUTO: 85.5 FL (ref 80–99)
METAMYELOCYTES NFR BLD MANUAL: 1 %
MONOCYTES # BLD: 1.1 K/UL (ref 0–1)
MONOCYTES NFR BLD: 7 % (ref 5–13)
MR PISA PV: 491.06 CM/S
NEUTS BAND NFR BLD MANUAL: 3 %
NEUTS SEG # BLD: 11.6 K/UL (ref 1.8–8)
NEUTS SEG NFR BLD: 73 % (ref 32–75)
NITRITE UR QL STRIP.AUTO: NEGATIVE
NRBC # BLD: 0.02 K/UL (ref 0–0.01)
NRBC BLD-RTO: 0.1 PER 100 WBC
PH UR STRIP: 6.5 [PH] (ref 5–8)
PLATELET # BLD AUTO: 357 K/UL (ref 150–400)
PMV BLD AUTO: 9.6 FL (ref 8.9–12.9)
POTASSIUM SERPL-SCNC: 3.3 MMOL/L (ref 3.5–5.1)
PROT UR STRIP-MCNC: NEGATIVE MG/DL
RBC # BLD AUTO: 3.99 M/UL (ref 3.8–5.2)
RBC #/AREA URNS HPF: NORMAL /HPF (ref 0–5)
RBC MORPH BLD: ABNORMAL
SODIUM SERPL-SCNC: 138 MMOL/L (ref 136–145)
SP GR UR REFRACTOMETRY: 1.01 (ref 1–1.03)
UR CULT HOLD, URHOLD: NORMAL
UROBILINOGEN UR QL STRIP.AUTO: 0.2 EU/DL (ref 0.2–1)
WBC # BLD AUTO: 15.3 K/UL (ref 3.6–11)
WBC #/AREA STL HPF: NORMAL /HPF (ref 0–4)
WBC URNS QL MICRO: NORMAL /HPF (ref 0–4)

## 2021-06-22 PROCEDURE — 74011250637 HC RX REV CODE- 250/637: Performed by: INTERNAL MEDICINE

## 2021-06-22 PROCEDURE — 74011000250 HC RX REV CODE- 250: Performed by: INTERNAL MEDICINE

## 2021-06-22 PROCEDURE — 80048 BASIC METABOLIC PNL TOTAL CA: CPT

## 2021-06-22 PROCEDURE — 81001 URINALYSIS AUTO W/SCOPE: CPT

## 2021-06-22 PROCEDURE — 77010033678 HC OXYGEN DAILY

## 2021-06-22 PROCEDURE — 74011250637 HC RX REV CODE- 250/637: Performed by: GENERAL ACUTE CARE HOSPITAL

## 2021-06-22 PROCEDURE — 74011000258 HC RX REV CODE- 258: Performed by: GENERAL ACUTE CARE HOSPITAL

## 2021-06-22 PROCEDURE — 93325 DOPPLER ECHO COLOR FLOW MAPG: CPT | Performed by: INTERNAL MEDICINE

## 2021-06-22 PROCEDURE — 94640 AIRWAY INHALATION TREATMENT: CPT

## 2021-06-22 PROCEDURE — 93312 ECHO TRANSESOPHAGEAL: CPT | Performed by: INTERNAL MEDICINE

## 2021-06-22 PROCEDURE — 36415 COLL VENOUS BLD VENIPUNCTURE: CPT

## 2021-06-22 PROCEDURE — 97161 PT EVAL LOW COMPLEX 20 MIN: CPT

## 2021-06-22 PROCEDURE — 74011250636 HC RX REV CODE- 250/636: Performed by: INTERNAL MEDICINE

## 2021-06-22 PROCEDURE — 2709999900 HC NON-CHARGEABLE SUPPLY

## 2021-06-22 PROCEDURE — 92960 CARDIOVERSION ELECTRIC EXT: CPT | Performed by: INTERNAL MEDICINE

## 2021-06-22 PROCEDURE — 85025 COMPLETE CBC W/AUTO DIFF WBC: CPT

## 2021-06-22 PROCEDURE — 97116 GAIT TRAINING THERAPY: CPT

## 2021-06-22 PROCEDURE — 74011250636 HC RX REV CODE- 250/636: Performed by: GENERAL ACUTE CARE HOSPITAL

## 2021-06-22 PROCEDURE — 99152 MOD SED SAME PHYS/QHP 5/>YRS: CPT | Performed by: INTERNAL MEDICINE

## 2021-06-22 PROCEDURE — 93320 DOPPLER ECHO COMPLETE: CPT | Performed by: INTERNAL MEDICINE

## 2021-06-22 RX ORDER — DILTIAZEM HYDROCHLORIDE 300 MG/1
300 CAPSULE, COATED, EXTENDED RELEASE ORAL DAILY
Qty: 30 CAPSULE | Refills: 0 | Status: SHIPPED | OUTPATIENT
Start: 2021-06-22

## 2021-06-22 RX ORDER — METRONIDAZOLE 500 MG/1
500 TABLET ORAL 3 TIMES DAILY
Qty: 3 TABLET | Refills: 0 | Status: SHIPPED | OUTPATIENT
Start: 2021-06-22 | End: 2021-06-23

## 2021-06-22 RX ORDER — METOPROLOL TARTRATE 25 MG/1
25 TABLET, FILM COATED ORAL EVERY 12 HOURS
Qty: 60 TABLET | Refills: 0 | Status: SHIPPED | OUTPATIENT
Start: 2021-06-22

## 2021-06-22 RX ADMIN — METOPROLOL TARTRATE 25 MG: 25 TABLET, FILM COATED ORAL at 08:31

## 2021-06-22 RX ADMIN — POTASSIUM BICARBONATE 40 MEQ: 782 TABLET, EFFERVESCENT ORAL at 08:32

## 2021-06-22 RX ADMIN — PANTOPRAZOLE SODIUM 40 MG: 40 TABLET, DELAYED RELEASE ORAL at 08:32

## 2021-06-22 RX ADMIN — DILTIAZEM HYDROCHLORIDE 90 MG: 30 TABLET, FILM COATED ORAL at 08:31

## 2021-06-22 RX ADMIN — GABAPENTIN 100 MG: 100 CAPSULE ORAL at 08:32

## 2021-06-22 RX ADMIN — ALBUTEROL SULFATE 2.5 MG: 2.5 SOLUTION RESPIRATORY (INHALATION) at 13:10

## 2021-06-22 RX ADMIN — FUROSEMIDE 40 MG: 10 INJECTION, SOLUTION INTRAMUSCULAR; INTRAVENOUS at 08:36

## 2021-06-22 RX ADMIN — CEFEPIME HYDROCHLORIDE 2 G: 2 INJECTION, POWDER, FOR SOLUTION INTRAVENOUS at 08:32

## 2021-06-22 RX ADMIN — ALBUTEROL SULFATE 2.5 MG: 2.5 SOLUTION RESPIRATORY (INHALATION) at 01:56

## 2021-06-22 RX ADMIN — METRONIDAZOLE 500 MG: 500 INJECTION, SOLUTION INTRAVENOUS at 06:16

## 2021-06-22 RX ADMIN — ALBUTEROL SULFATE 2.5 MG: 2.5 SOLUTION RESPIRATORY (INHALATION) at 08:44

## 2021-06-22 RX ADMIN — SERTRALINE 100 MG: 50 TABLET, FILM COATED ORAL at 08:32

## 2021-06-22 RX ADMIN — POTASSIUM BICARBONATE 40 MEQ: 782 TABLET, EFFERVESCENT ORAL at 11:52

## 2021-06-22 RX ADMIN — APIXABAN 5 MG: 5 TABLET, FILM COATED ORAL at 08:32

## 2021-06-22 RX ADMIN — DIGOXIN 0.12 MG: 125 TABLET ORAL at 08:32

## 2021-06-22 NOTE — PROGRESS NOTES
Gastroenterology Daily Progress Note   [facility] [place]   for Dr. Ryan Ruiz)   [facility]    Admit Date: 6/16/2021     F/u N/V/D, abnormal CT    Subjective: In NSR s/p cardioversion  Stool is thickening up per patient. Had a semi formed BM this AM.  Non bloody  Enteric pathogens negative  Tolerating regular diet without abd pain or N/V. Afebrile. AM labs pending. PICC team called. Feels \"100x better\" than when first admitted. On Eliquis, no overt bleeding and occult stool negative, hgb 9.5 yesterday  S/p negative EGD 6/18/21      colonoscopy 2/13/19 Findings:   · Her colon is mildly redundant, using a pediatric scope. She needed abdominal pressure to get the scope to the cecum. · TI could not be intubated despite trying. · There are no masses seen. · A small, 4 mm, sessile sigmoid colon polyp was removed with a cold biopsy forceps. Medium sized internal hemorrhoids with anal papillae noted.     Current Facility-Administered Medications   Medication Dose Route Frequency    albuterol (PROVENTIL VENTOLIN) nebulizer solution 2.5 mg  2.5 mg Inhalation Q6H RT    furosemide (LASIX) injection 40 mg  40 mg IntraVENous DAILY    potassium bicarb-citric acid (EFFER-K) tablet 40 mEq  40 mEq Oral DAILY    metoprolol tartrate (LOPRESSOR) tablet 25 mg  25 mg Oral Q12H    dilTIAZem IR (CARDIZEM) tablet 90 mg  90 mg Oral QID    apixaban (ELIQUIS) tablet 5 mg  5 mg Oral Q12H    sodium chloride (NS) flush 5-40 mL  5-40 mL IntraVENous Q8H    sodium chloride (NS) flush 5-40 mL  5-40 mL IntraVENous PRN    pantoprazole (PROTONIX) tablet 40 mg  40 mg Oral ACB    gabapentin (NEURONTIN) capsule 100 mg  100 mg Oral TID    sertraline (ZOLOFT) tablet 100 mg  100 mg Oral DAILY    acetaminophen (TYLENOL) tablet 650 mg  650 mg Oral Q6H PRN    Or    acetaminophen (TYLENOL) suppository 650 mg  650 mg Rectal Q6H PRN    polyethylene glycol (MIRALAX) packet 17 g  17 g Oral DAILY PRN    ondansetron (ZOFRAN ODT) tablet 4 mg  4 mg Oral Q8H PRN    Or    ondansetron (ZOFRAN) injection 4 mg  4 mg IntraVENous Q6H PRN    digoxin (LANOXIN) tablet 0.125 mg  0.125 mg Oral DAILY    cefepime (MAXIPIME) 2 g in 0.9% sodium chloride (MBP/ADV) 100 mL MBP  2 g IntraVENous Q12H    metroNIDAZOLE (FLAGYL) IVPB premix 500 mg  500 mg IntraVENous Q8H    morphine injection 1 mg  1 mg IntraVENous Q4H PRN        Objective:     Visit Vitals  BP (!) 140/86   Pulse 89   Temp 97.9 °F (36.6 °C)   Resp 20   Ht 5' 6\" (1.676 m)   Wt 145.2 kg (320 lb)   SpO2 97%   BMI 51.65 kg/m²   Blood pressure (!) 140/86, pulse 89, temperature 97.9 °F (36.6 °C), resp. rate 20, height 5' 6\" (1.676 m), weight 145.2 kg (320 lb), SpO2 97 %. 06/22 0701 - 06/22 1900  In: -   Out: 100 [Urine:100]    06/20 1901 - 06/22 0700  In: 1230 [P.O.:1230]  Out: 1400 [Urine:1400]      Intake/Output Summary (Last 24 hours) at 6/22/2021 0851  Last data filed at 6/22/2021 0805  Gross per 24 hour   Intake 980 ml   Output 1500 ml   Net -520 ml         Physical Exam:       General: obese wf in nad  Chest:  CTA, No rhonchi, rales or rubs.   Heart: S1, S2, RRR  GI: Soft, NT, ND + bowel sounds  Extremities: no edema   CNS: CN II-XII normal.      Labs:       Recent Results (from the past 24 hour(s))   ECHO JASMINA W POSSIBLE CARDIOVERSION    Collection Time: 06/21/21  9:32 AM   Result Value Ref Range    PISA MR Rad 0.52 cm    Mitral Effective Regurgitant Orifice Area 0.16 cm2    MV regurgitant volume 22.40 mL    Mitral Regurgitant PISA Peak Velocity 491.06 cm/s    Mitral Regurgitant Velocity Time Integral 143.39 cm    Triscuspid Valve Regurgitation Peak Gradient 36.41 mmHg    TR Max Velocity 301.71 cm/s   EKG, 12 LEAD, INITIAL    Collection Time: 06/21/21 10:11 AM   Result Value Ref Range    Ventricular Rate 75 BPM    Atrial Rate 75 BPM    P-R Interval 214 ms    QRS Duration 86 ms    Q-T Interval 392 ms    QTC Calculation (Bezet) 437 ms    Calculated P Axis 60 degrees Calculated R Axis 66 degrees    Calculated T Axis 59 degrees    Diagnosis       Sinus rhythm with 1st degree AV block  Low voltage QRS  Borderline ECG  When compared with ECG of 16-JUN-2021 10:51,  Sinus rhythm has replaced Atrial flutter  Vent. rate has decreased BY  68 BPM  ST no longer depressed in Lateral leads  Nonspecific T wave abnormality no longer evident in Lateral leads  Confirmed by Shine Arndt ([id number]) on 6/21/2021 11:28:06 AM     CBC WITH AUTOMATED DIFF    Collection Time: 06/21/21 11:20 AM   Result Value Ref Range    WBC 16.6 (H) 3.6 - 11.0 K/uL    RBC 3.62 (L) 3.80 - 5.20 M/uL    HGB 9.6 (L) 11.5 - 16.0 g/dL    HCT 30.4 (L) 35.0 - 47.0 %    MCV 84.0 80.0 - 99.0 FL    MCH 26.5 26.0 - 34.0 PG    MCHC 31.6 30.0 - 36.5 g/dL    RDW 21.2 (H) 11.5 - 14.5 %    PLATELET 293 936 - 803 K/uL    MPV 9.9 8.9 - 12.9 FL    NRBC 0.1 (H) 0  WBC    ABSOLUTE NRBC 0.02 (H) 0.00 - 0.01 K/uL    NEUTROPHILS 77 (H) 32 - 75 %    BAND NEUTROPHILS 2 %    LYMPHOCYTES 14 12 - 49 %    MONOCYTES 4 (L) 5 - 13 %    EOSINOPHILS 1 0 - 7 %    BASOPHILS 0 0 - 1 %    MYELOCYTES 2 %    IMMATURE GRANULOCYTES 0 0.0 - 0.5 %    ABS. NEUTROPHILS 13.1 (H) 1.8 - 8.0 K/UL    ABS. LYMPHOCYTES 2.3 0.8 - 3.5 K/UL    ABS. MONOCYTES 0.7 0.0 - 1.0 K/UL    ABS. EOSINOPHILS 0.2 0.0 - 0.4 K/UL    ABS. BASOPHILS 0.0 0.0 - 0.1 K/UL    ABS. IMM.  GRANS. 0.0 0.00 - 0.04 K/UL    DF MANUAL      RBC COMMENTS ANISOCYTOSIS  2+       DIGOXIN    Collection Time: 06/21/21 11:20 AM   Result Value Ref Range    Digoxin level 0.8 (L) 0.90 - 2.00 NG/ML   MAGNESIUM    Collection Time: 06/21/21 11:20 AM   Result Value Ref Range    Magnesium 1.8 1.6 - 2.4 mg/dL   METABOLIC PANEL, COMPREHENSIVE    Collection Time: 06/21/21 11:20 AM   Result Value Ref Range    Sodium 140 136 - 145 mmol/L    Potassium 3.0 (L) 3.5 - 5.1 mmol/L    Chloride 106 97 - 108 mmol/L    CO2 28 21 - 32 mmol/L    Anion gap 6 5 - 15 mmol/L    Glucose 102 (H) 65 - 100 mg/dL    BUN 8 6 - 20 MG/DL Creatinine 0.67 0.55 - 1.02 MG/DL    BUN/Creatinine ratio 12 12 - 20      GFR est AA >60 >60 ml/min/1.73m2    GFR est non-AA >60 >60 ml/min/1.73m2    Calcium 8.6 8.5 - 10.1 MG/DL    Bilirubin, total 0.5 0.2 - 1.0 MG/DL    ALT (SGPT) 24 12 - 78 U/L    AST (SGOT) 17 15 - 37 U/L    Alk.  phosphatase 109 45 - 117 U/L    Protein, total 6.9 6.4 - 8.2 g/dL    Albumin 2.3 (L) 3.5 - 5.0 g/dL    Globulin 4.6 (H) 2.0 - 4.0 g/dL    A-G Ratio 0.5 (L) 1.1 - 2.2     NT-PRO BNP    Collection Time: 06/21/21 11:20 AM   Result Value Ref Range    NT pro-BNP 2,077 (H) <125 PG/ML   PROCALCITONIN    Collection Time: 06/21/21 11:20 AM   Result Value Ref Range    Procalcitonin 0.31 ng/mL   GIARDIA LAMBLIA AG    Collection Time: 06/21/21 12:36 PM    Specimen: Stool   Result Value Ref Range    Giardia Ag Negative NEG     ENTERIC BACTERIA PANEL, DNA    Collection Time: 06/21/21 12:36 PM   Result Value Ref Range    Shigella species, DNA Negative NEG      Campylobacter species, DNA Negative NEG      Vibrio species, DNA Negative NEG      Enterotoxigen E Coli, DNA Negative NEG      Shiga toxin producing, DNA Negative NEG      Salmonella species, DNA Negative NEG      P. shigelloides, DNA Negative NEG      Y. enterocolitica, DNA Negative NEG     URINALYSIS W/MICROSCOPIC    Collection Time: 06/22/21  2:11 AM   Result Value Ref Range    Color YELLOW/STRAW      Appearance CLEAR CLEAR      Specific gravity 1.009 1.003 - 1.030      pH (UA) 6.5 5.0 - 8.0      Protein Negative NEG mg/dL    Glucose Negative NEG mg/dL    Ketone Negative NEG mg/dL    Bilirubin Negative NEG      Blood Negative NEG      Urobilinogen 0.2 0.2 - 1.0 EU/dL    Nitrites Negative NEG      Leukocyte Esterase Negative NEG      WBC 0-4 0 - 4 /hpf    RBC 0-5 0 - 5 /hpf    Epithelial cells FEW FEW /lpf    Bacteria Negative NEG /hpf    Hyaline cast 0-2 0 - 5 /lpf   URINE CULTURE HOLD SAMPLE    Collection Time: 06/22/21  2:11 AM    Specimen: Serum; Urine   Result Value Ref Range    Urine culture hold        Urine on hold in Microbiology dept for 2 days. If unpreserved urine is submitted, it cannot be used for addtional testing after 24 hours, recollection will be required. LABRCNT(wbc:2,hgb:2,hct:2,plt:2,)  Recent Labs     06/21/21  1120 06/19/21  0930    136   K 3.0* 2.9*    104   CO2 28 27   BUN 8 9   CREA 0.67 0.77   * 129*   CA 8.6 8.6   MG 1.8  --    LABRCNT(sgot:3,gpt:3,ap:3,tbiL:3,TP:3,ALB:3,GLOB:3,ggt:3,aml:3,amyp:3,lpse:3,hlpse:3)No results for input(s): INR, PTP, APTT, INREXT, INREXT in the last 72 hours. Recent Labs     06/21/21  1120      TP 6.9   ALB 2.3*   GLOB 4.6*   BRIEFLAB(B12,FOL,FOLAT,RBCF)No results found for: FOL, RBCFLABRCNT(CPK:3,CpKMB:3,ckndx:3,troiq:3)No components found for: GLPOCBRIEFLAB(CHOL,CHOLX,CHOLP,CHLST,CHOLV,HDL,HDLC,HDLP,LDL,DLDL,LDLC,DLDLP,TGL,TGLX,TRIGL,TRIGP,CHHD,CHHDX)No results for input(s): PH, PCO2, PO2 in the last 72 hours. LABRCNT(CPK:3,CpKMB:3,ckndx:3,troiq:3)ZAHEER Orozco  No results for input(s): CPK, CKNDX, TROIQ in the last 72 hours. No lab exists for component: CPKMBMEShZAHEER Dixon      Problem List:     Active Problems:    Obesity, morbid ([facility] [address] (5/16/2018)      Atrial fibrillation with RVR ([facility] [address] (6/16/2021)      Nausea & vomiting (6/17/2021)      Diarrhea (6/17/2021)      Dehydration (6/17/2021)      Acute kidney injury ([facility] [address] (6/17/2021)      Cholelithiasis (6/17/2021)      Arrhythmia (6/18/2021)      HTN (hypertension) (6/18/2021)      Dyslipidemia (6/18/2021)        Impression:  N/V, abd pain - resolved  Abnormal CT: There is inflammatory stranding in the right upper quadrant extending  anterolaterally from the second portion of the duodenum to the ascending colon. This may be due to duodenal ulcer/duodenitis. s/p negative EGD  Diarrhea  Afib s/p cardioversion with anticoagulation         Plan:  Continue metronidazole for 1 more day to complete a 7-day course then can d/c. Overall, much improved. Suspect she had infectious gastroenteritis that is improving. Hgb stable without overt bleeding, on Eliquis. EGD neg. No plans for colonoscopy at this time. We will sign off and follow up as an outpatient. ZAHEER Nieves  8:54 AM   6/22/2021 [address] [place] [address], [address] [place] [address].. [place] [address] [id number]  [address] [place] [address] [place]: [phone]    I have examined the patient. I have reviewed the chart and agree with the documentation recorded by the LACEY, including the assessment, treatment plan, and disposition. Patient seen and examined by me. I personally performed all components of the history, physical, and medical decision making and agree with the assessment and plan with minor modifications as noted. Exam:  Alert and awake  HEENT AT  GI: soft w/ normal bowel sounds    Plan:  She has had no BM today. Agree w/ completing flagyl course. We will have her come back as OP to see us after she is d/tiffani. Advance diet slowly and work towards d/c. Stable from a GI perspective. Signing off. Korina Cummins MD  [facility])

## 2021-06-22 NOTE — PROGRESS NOTES
Problem: Falls - Risk of  Goal: *Absence of Falls  Description: Document Burrell Canavan Fall Risk and appropriate interventions in the flowsheet.   Outcome: Progressing Towards Goal  Note: Fall Risk Interventions:  Mobility Interventions: Bed/chair exit alarm         Medication Interventions: Assess postural VS orthostatic hypotension    Elimination Interventions: Stay With Me (per policy)    History of Falls Interventions: Bed/chair exit alarm         Problem: Patient Education: Go to Patient Education Activity  Goal: Patient/Family Education  Outcome: Progressing Towards Goal     Problem: Patient Education: Go to Patient Education Activity  Goal: Patient/Family Education  Outcome: Progressing Towards Goal     Problem: Afib Pathway: Day 1  Goal: Off Pathway (Use only if patient is Off Pathway)  Outcome: Progressing Towards Goal  Goal: Activity/Safety  Outcome: Progressing Towards Goal  Goal: Consults, if ordered  Outcome: Progressing Towards Goal  Goal: Diagnostic Test/Procedures  Outcome: Progressing Towards Goal  Goal: Nutrition/Diet  Outcome: Progressing Towards Goal  Goal: Discharge Planning  Outcome: Progressing Towards Goal  Goal: Medications  Outcome: Progressing Towards Goal  Goal: Respiratory  Outcome: Progressing Towards Goal  Goal: Treatments/Interventions/Procedures  Outcome: Progressing Towards Goal  Goal: Psychosocial  Outcome: Progressing Towards Goal  Goal: *Optimal pain control at patient's stated goal  Outcome: Progressing Towards Goal  Goal: *Hemodynamically stable  Outcome: Progressing Towards Goal  Goal: *Stable cardiac rhythm  Outcome: Progressing Towards Goal  Goal: *Lungs clear or at baseline  Outcome: Progressing Towards Goal  Goal: *Labs within defined limits  Outcome: Progressing Towards Goal  Goal: *Describes available resources and support systems  Outcome: Progressing Towards Goal     Problem: Afib Pathway: Day 2  Goal: Off Pathway (Use only if patient is Off Pathway)  Outcome: Progressing Towards Goal  Goal: Activity/Safety  Outcome: Progressing Towards Goal  Goal: Consults, if ordered  Outcome: Progressing Towards Goal  Goal: Diagnostic Test/Procedures  Outcome: Progressing Towards Goal  Goal: Nutrition/Diet  Outcome: Progressing Towards Goal  Goal: Discharge Planning  Outcome: Progressing Towards Goal  Goal: Medications  Outcome: Progressing Towards Goal  Goal: Respiratory  Outcome: Progressing Towards Goal  Goal: Treatments/Interventions/Procedures  Outcome: Progressing Towards Goal  Goal: Psychosocial  Outcome: Progressing Towards Goal  Goal: *Hemodynamically stable  Outcome: Progressing Towards Goal  Goal: *Optimal pain control at patient's stated goal  Outcome: Progressing Towards Goal  Goal: *Stable cardiac rhythm  Outcome: Progressing Towards Goal  Goal: *Lungs clear or at baseline  Outcome: Progressing Towards Goal  Goal: *Describes available resources and support systems  Outcome: Progressing Towards Goal     Problem: Afib Pathway: Day 3  Goal: Off Pathway (Use only if patient is Off Pathway)  Outcome: Progressing Towards Goal  Goal: Activity/Safety  Outcome: Progressing Towards Goal  Goal: Diagnostic Test/Procedures  Outcome: Progressing Towards Goal  Goal: Nutrition/Diet  Outcome: Progressing Towards Goal  Goal: Discharge Planning  Outcome: Progressing Towards Goal  Goal: Medications  Outcome: Progressing Towards Goal  Goal: Respiratory  Outcome: Progressing Towards Goal  Goal: Treatments/Interventions/Procedures  Outcome: Progressing Towards Goal  Goal: Psychosocial  Outcome: Progressing Towards Goal     Problem: Afib: Discharge Outcomes (not in CAT)  Goal: *Hemodynamically stable  Outcome: Progressing Towards Goal  Goal: *Stable cardiac rhythm  Outcome: Progressing Towards Goal  Goal: *Lungs clear or at baseline  Outcome: Progressing Towards Goal  Goal: *Optimal pain control at patient's stated goal  Outcome: Progressing Towards Goal  Goal: *Identifies cardiac risk factors  Outcome: Progressing Towards Goal  Goal: *Verbalizes home exercise program, activity guidelines, cardiac precautions  Outcome: Progressing Towards Goal  Goal: *Verbalizes understanding and describes prescribed diet  Outcome: Progressing Towards Goal  Goal: *Verbalizes understanding and describes medication purposes and frequencies  Outcome: Progressing Towards Goal  Goal: *Anxiety reduced or absent  Outcome: Progressing Towards Goal  Goal: *Understands and describes signs and symptoms to report to providers(Stroke Metric)  Outcome: Progressing Towards Goal  Goal: *Describes follow-up/return visits to physicians  Outcome: Progressing Towards Goal  Goal: *Describes available resources and support systems  Outcome: Progressing Towards Goal  Goal: *Influenza immunization  Outcome: Progressing Towards Goal  Goal: *Pneumococcal immunization  Outcome: Progressing Towards Goal  Goal: *Describes smoking cessation resources  Outcome: Progressing Towards Goal     Problem: Pressure Injury - Risk of  Goal: *Prevention of pressure injury  Description: Document Brandon Scale and appropriate interventions in the flowsheet.   Outcome: Progressing Towards Goal  Note: Pressure Injury Interventions:  Sensory Interventions: Assess need for specialty bed, Assess changes in LOC, Float heels, Discuss PT/OT consult with provider, Check visual cues for pain, Maintain/enhance activity level, Sit a 90-degree angle/use footstool if needed, Use 30-degree side-lying position    Moisture Interventions: Absorbent underpads, Assess need for specialty bed, Apply protective barrier, creams and emollients, Maintain skin hydration (lotion/cream), Moisture barrier, Offer toileting Q_hr    Activity Interventions: Assess need for specialty bed, Increase time out of bed, PT/OT evaluation, Pressure redistribution bed/mattress(bed type)    Mobility Interventions: Assess need for specialty bed, Float heels, HOB 30 degrees or less, PT/OT evaluation, Turn and reposition approx.  every two hours(pillow and wedges)    Nutrition Interventions: Document food/fluid/supplement intake, Discuss nutritional consult with provider, Offer support with meals,snacks and hydration    Friction and Shear Interventions: Apply protective barrier, creams and emollients, Feet elevated on foot rest, Foam dressings/transparent film/skin sealants, HOB 30 degrees or less                Problem: Patient Education: Go to Patient Education Activity  Goal: Patient/Family Education  Outcome: Progressing Towards Goal     Problem: Breathing Pattern - Ineffective  Goal: *Absence of hypoxia  Outcome: Progressing Towards Goal  Goal: *Use of effective breathing techniques  Outcome: Progressing Towards Goal

## 2021-06-22 NOTE — PROGRESS NOTES
PHYSICAL THERAPY EVALUATION/DISCHARGE  Patient: Justin Barber (36 y.o. female)  Date: 6/22/2021  Primary Diagnosis: Atrial fibrillation with RVR ([address] [I48.91]  Procedure(s) (LRB):  EP CARDIOVERSION (N/A) 1 Day Post-Op   Precautions:          ASSESSMENT  Based on the objective data described below, the patient presents at her independent baseline with functional mobility and ambulation. Cleared with nursing prior to mobilization. Pt received sitting in chair, on 2L/min, and agreeable to PT session. Pt stated she had to use the bathroom, assisted pt with disconnecting IV pole, and pt removed O2 stating she did not need it in the bathroom. She ambulated independently to the bathroom and returned sitting in the chair. Pt was able to provide social hx stating she has access to a cane and  occasionally uses it while ambulating. She denies any supplemental oxygen use at home prior to admission. Pt has no concerns with her mobility, stating she actually feels stronger walking here in the hospital than she did at home. After returning to the chair, spO2 was 95-97% and pt denied any SOB. Educated pt on safety when independtly ambulating to the bathroom with IV pole. Pt left sitting in chair on room air with needs and call bell within reach. Nursing informed of O2 change and ambulation status. Functional Outcome Measure: The patient scored 100/100 on the Barthel Index outcome measure which is indicative of complete independence. .      Other factors to consider for discharge:      Further skilled acute physical therapy is not indicated at this time. PLAN :  Recommendation for discharge: (in order for the patient to meet his/her long term goals)  No skilled physical therapy/ follow up rehabilitation needs identified at this time.     This discharge recommendation:  Has been made in collaboration with the attending provider and/or case management    IF patient discharges home will need the following DME: patient owns DME required for discharge       SUBJECTIVE:   Patient stated My legs feel stronger here actually.     OBJECTIVE DATA SUMMARY:   HISTORY:    Past Medical History:   Diagnosis Date    Arrhythmia 6/18/2021    Cancer [facility]) 1986    cervical    Dyslipidemia 6/18/2021    Fibromyalgia     GERD (gastroesophageal reflux disease)     HTN (hypertension) 6/18/2021     Past Surgical History:   Procedure Laterality Date    COLONOSCOPY N/A 2/13/2019    COLONOSCOPY performed by Jazmín Dhaliwal MD at [facility]     [facility] HX GYN      cone resection       Prior level of function: Independent with ambulation and ADLs  Personal factors and/or comorbidities impacting plan of care: HTN    Home Situation  Home Environment: Private residence  # Steps to Enter: 3  Rails to Enter: Yes  Hand Rails : Left  One/Two Story Residence: Two story  # of Interior Steps: 5  Interior Rails: Left  Living Alone: No  Support Systems: Spouse/Significant Other/Partner  Patient Expects to be Discharged to[de-identified] [employer]  Current DME Used/Available at Home: Cane, straight    EXAMINATION/PRESENTATION/DECISION MAKING:   Critical Behavior:  Neurologic State: Alert  Orientation Level: Oriented X4  Cognition: Appropriate decision making  Safety/Judgement: Awareness of environment, Insight into deficits  Hearing: Auditory  Auditory Impairment: None    Edema: WNL  Range Of Motion:  AROM: Within functional limits                       Strength:    Strength:  Within functional limits                    Tone & Sensation:                                  Coordination:  Coordination: Within functional limits  Vision:      Functional Mobility:  Bed Mobility:              Transfers:  Sit to Stand: Independent  Stand to Sit: Independent                       Balance:   Sitting: Intact  Standing: Intact  Ambulation/Gait Training:  Distance (ft): 20 Feet (ft)     Ambulation - Level of Assistance: Independent     Gait Description (WDL): Within defined limits            Functional Measure:  Barthel Index:    Bathin  Bladder: 10  Bowels: 10  Groomin  Dressing: 10  Feeding: 10  Mobility: 15  Stairs: 10  Toilet Use: 10  Transfer (Bed to Chair and Back): 15  Total: 100/100       The Barthel ADL Index: Guidelines  1. The index should be used as a record of what a patient does, not as a record of what a patient could do. 2. The main aim is to establish degree of independence from any help, physical or verbal, however minor and for whatever reason. 3. The need for supervision renders the patient not independent. 4. A patient's performance should be established using the best available evidence. Asking the patient, friends/relatives and nurses are the usual sources, but direct observation and common sense are also important. However direct testing is not needed. 5. Usually the patient's performance over the preceding 24-48 hours is important, but occasionally longer periods will be relevant. 6. Middle categories imply that the patient supplies over 50 per cent of the effort. 7. Use of aids to be independent is allowed. Cornelia Frias., BarthelDANNYW. (1994). Functional evaluation: the Barthel Index. [facility] (14)2. REFUGIO Nielsen, Fanny Bellamy., Paige Arthur., [place], [address] [place] [address] (). Measuring the change indisability after inpatient rehabilitation; comparison of the responsiveness of the Barthel Index and Functional Millard Measure. Journal of Neurology, Neurosurgery, and Psychiatry, 66(4), 891-282. Evon Perez, N.J.A, KARMA Chauhan, & Belinda Le M.A. (2004.) Assessment of post-stroke quality of life in cost-effectiveness studies: The usefulness of the Barthel Index and the EuroQoL-5D.  Quality of Life Research, 15, 609-86          Physical Therapy Evaluation Charge Determination   History Examination Presentation Decision-Making   MEDIUM  Complexity : 1-2 comorbidities / personal factors will impact the outcome/ POC  MEDIUM Complexity : 3 Standardized tests and measures addressing body structure, function, activity limitation and / or participation in recreation  LOW Complexity : Stable, uncomplicated  Other outcome measures Barthel Index  LOW       Based on the above components, the patient evaluation is determined to be of the following complexity level: LOW     Pain Rating:  No complaints    Activity Tolerance:   Good      After treatment patient left in no apparent distress:   Sitting in chair and Call bell within reach    COMMUNICATION/EDUCATION:   The patients plan of care was discussed with: Registered nurse. Patient/family have participated as able in goal setting and plan of care. Thank you for this referral.  Kathy Palomo, SPT   Time Calculation: 19 mins    Regarding student involvement in patient care:  A student participated in this treatment session. Per CMS Medicare statements and APTA guidelines I certify that the following was true:  1. I was present and directly observed the entire session. 2. I made all skilled judgments and clinical decisions regarding care. 3. I am the practitioner responsible for assessment, treatment, and documentation.

## 2021-06-22 NOTE — DISCHARGE SUMMARY
Hospitalist Discharge Summary     Patient ID:  Kishor Kwon  [id number]  36 y.o.  1960 6/16/2021    PCP on record: Joel Chapin NP    Admit date: 6/16/2021  Discharge date and time: 6/22/2021    DISCHARGE DIAGNOSIS:    Sepsis  POA WBC 18.2, , fever on 6/19  Lactic acidosis lactate 2.18  Probable gastroenteritis POA  Diarrhea with associated N/V POA       Acute kidney injury POA Admit BUN/creat 33 and 2.05, improved  Hypokalemia K 2.4 POA low again, will supplement       Acute on chronic diastolic CHF POA LVEF 62-69%  New Atrial fib/Flutter POA s/p DC cardioversion 6/21  Pulmonary hypertension POA PA pressure 50  Dizziness        Acute hypoxic resp failure 2/2 bibasilar ATX       Essential HTN POA  Hyperlipidemia POA      CONSULTATIONS:  IP CONSULT TO HOSPITALIST  IP CONSULT TO GASTROENTEROLOGY  IP CONSULT TO GENERAL SURGERY  IP CONSULT TO CARDIOLOGY    Excerpted HPI from H&P of Saloni Doherty MD:  Kishor Kwon is a 62 y.o.  female who presents with the above CC. Pt started having abd pain and fever about 3 days ago, temp up to 103, associated with biliary vomiting and watery diarrhea (biliary, non bloody). She also noticed getting dizziness, worse w exertion and mild SOB. Pt denies UTI symptoms, chest pain, syncope, focal weakness/numbness, HA, change in vision, slurred speech, and bleeding issues. Pt is social drinker. Had prior EGD/[place] that reported polyps and hemorrhoids. Upon arrival to ER, BP was low and improved after 4 liters of IVF, sats in high 80s, EKG showed Atrial flutter with variable AV block w HR in 130s.    Blood work remarkable for WBC of 18.6, D Dimer 3.3, K 2.4, BUN 33, Cr 2, Bili 1.4, ALT 80, AST 64, lactic acid 2.8  Trop, TSH wnl. Rapid covid neg.     NM LUNG SCAN PERF     Result Date: 6/16/2021  Low probability for pulmonary embolism.      CT CHEST WO CONT     Result Date: 6/16/2021  RUQ inflammation probably duodenal in origin. Cholelithiasis. Hepatic steatosis. Mild bibasilar atelectasis.       CT ABD PELV WO CONT     Result Date: 6/16/2021  RUQ inflammation probably duodenal in origin. Cholelithiasis. Hepatic steatosis. Mild bibasilar atelectasis.       US ABD LTD     Result Date: 6/16/2021  Technically limited exam. Cholelithiasis. Heterogeneously increased liver echotexture is nonspecific      XR CHEST PORT     Result Date: 6/16/2021  No acute process. ______________________________________________________________________  DISCHARGE SUMMARY/HOSPITAL COURSE:  for full details see H&P, daily progress notes, labs, consult notes. Sepsis  POA WBC 18.2, , fever on 6/19  Lactic acidosis lactate 2.18  Probable gastroenteritis POA  Diarrhea with associated N/V POA  Presents with RUQ tenderness, associated N/v and diarrhea  RUQ pain resolved, no N/V  Diarrhea resolved  CT w/o contrast reported inflammatory stranding in the right upper quadrant extending       anterolaterally from the second portion of the duodenum and the ascending colon. Gallstones on US, HIDA with no obstruction or evidence of cholecystitis  Normal amylase and lipase  UA with 0-4 WBC, 0-5 RBC  CT chest with minimal atelectasis, No ASD  V/Q scan was low prob  EGD with Dr Armani Collins:     Esophagus:                  + Normal mucosa throughout the esophagus without any erosions, bleeding.                            Z line normal at 40 cm from incisors.     Stomach:                   + There was streaking erythema in the gastric body/antrum with small raised                    nodule x 2 prepyloric s/p Bxs.  No blood in UGI tract,  No ulcers seen.      Duodenum:                   + Scope advanced past second portion to the 3rd portion of duodenum.                          Normal mucosa throughout the SB, normal villi                       no ulcers/erosions seen in duodenum.   Suspected gastroenteritis         No duodenitis or PUD         Cholelithiasis but no cholecystitis  IV empiric cefepime and flagyl x 7 days, change to PO at discharge for 1 more days to complete total 7 days  On room air     Acute kidney injury POA Admit BUN/creat 33 and 2.05, improved  Hypokalemia K 2.4 POA low again, will supplement  Echo with IV fluids  Serial labs  No hydronephrosis on CT scans  Discharge on potassium supplements and reassess as an outpatient  1 dose of potassium was given before discharge     Acute on chronic diastolic CHF POA LVEF 78-42%  New Atrial fib/Flutter POA s/p DC cardioversion 6/21  Pulmonary hypertension POA PA pressure 50  Dizziness   Denies prior atrial fib or flutter  Dilt drip being transitioned to PO  Cardio consult, input is appreciated  TSH wnl  ECHO LVEF 55-60%, normal RV function, Pulm HTN PA pressure 50  Started on eliquis, continue on discharge  Cardioversion to NSR on 6/21  IV lasix to be changed to p.o. on discharge  No digoxin on discharge, I discussed with cardiology before discharge.     Acute hypoxic resp failure 2/2 bibasilar ATX  VQ scan neg  CT chest w mild bibasilar ATX  Cont pulse ox  Check repeat CXR  IV lasix to be p.o. discharge    Essential HTN POA  Hyperlipidemia POA  PO cardizem, metoprolol  Resume home statin        _______________________________________________________________________  Patient seen and examined by me on discharge day. Pertinent Findings:  Gen:    Not in distress  Chest: Clear lungs  CVS:   Regular rhythm. No edema  Abd:  Soft, not distended, not tender  Neuro:  Alert,   _______________________________________________________________________  DISCHARGE MEDICATIONS:   Current Discharge Medication List      START taking these medications    Details   apixaban (ELIQUIS) 5 mg tablet Take 1 Tablet by mouth every twelve (12) hours. Qty: 60 Tablet, Refills: 0  Start date: 6/22/2021      metoprolol tartrate (LOPRESSOR) 25 mg tablet Take 1 Tablet by mouth every twelve (12) hours.   Qty: 60 Tablet, Refills: 0  Start date: 6/22/2021      potassium bicarb-citric acid (EFFER-K) 20 mEq tablet Take 2 Tablets by mouth two (2) times a day. Qty: 30 Tablet, Refills: 1  Start date: 6/22/2021      dilTIAZem ER (CARDIZEM CD) 300 mg capsule Take 1 Capsule by mouth daily. Qty: 30 Capsule, Refills: 0  Start date: 6/22/2021      metroNIDAZOLE (FlagyL) 500 mg tablet Take 1 Tablet by mouth three (3) times daily for 1 day. Qty: 3 Tablet, Refills: 0  Start date: 6/22/2021, End date: 6/23/2021         CONTINUE these medications which have NOT CHANGED    Details   tiZANidine (ZANAFLEX) 2 mg tablet TAKE 1 TABLET BY MOUTH EVERY 8 HOURS AS NEEDED  Qty: 20 Tab, Refills: 1    Comments: Please consider 90 day supplies to promote better adherence  Associated Diagnoses: Muscle spasm of back      sertraline (ZOLOFT) 100 mg tablet Take 1 Tab by mouth daily. Indications: ANXIETY WITH DEPRESSION  Qty: 30 Tab, Refills: 1    Associated Diagnoses: Anxiety and depression      rosuvastatin (CRESTOR) 20 mg tablet Take 1 Tab by mouth daily. Qty: 90 Tab, Refills: 1    Associated Diagnoses: Hypercholesterolemia      omeprazole (PRILOSEC) 40 mg capsule Take 1 Cap by mouth daily. Qty: 90 Cap, Refills: 3    Associated Diagnoses: Gastroesophageal reflux disease without esophagitis      diclofenac (VOLTAREN) 1 % gel Apply 2 g to affected area four (4) times daily. Qty: 2 g, Refills: 3    Associated Diagnoses: Arthritis      gabapentin (NEURONTIN) 100 mg capsule Take 1 Cap by mouth three (3) times daily. Max Daily Amount: 300 mg. Indications: nerve pain after herpes  Qty: 90 Cap, Refills: 1    Associated Diagnoses: Post herpetic neuralgia         STOP taking these medications       hydroCHLOROthiazide (MICROZIDE) 12.5 mg capsule Comments:   Reason for Stopping:                 Patient Follow Up Instructions: Activity: Activity as tolerated  Diet: Resume previous diet  Wound Care: None needed      Follow BMP for hypokalemia as an outpatient.     Follow-up Information Follow up With Specialties Details Why Contact Conner Gibson MD Cardiology, [address] [facility], Internal Medicine On 7/7/2021 at 1:00PM with [facility] [address] [place] [address]  [address] Christophe Eduardo, [facility] Nurse Practitioner   [facility] [address]  [phone]          ________________________________________________________________    Risk of deterioration: Low    Condition at Discharge:  Stable  __________________________________________________________________    Disposition  Home with family, no needs    ____________________________________________________________________    Code Status: Full Code  ___________________________________________________________________      Total time in minutes spent coordinating this discharge (includes going over instructions, follow-up, prescriptions, and preparing report for sign off to her PCP) :  >30 minutes    Signed:  Francisco Garsia MD

## 2021-06-22 NOTE — PROGRESS NOTES
Hospitalist Progress Note    NAME: Devika Gonzalez   :  1960   MRN:  [id number]     Admit date: 2021    Today's date: 21    PCP: CAIO Roberts M.D. Cell [phone]    Anticipated discharge date: 2021      Assessment / Plan:    Sepsis  POA WBC 18.2, , fever on   Lactic acidosis lactate 2.18  Probable gastroenteritis POA  Diarrhea with associated N/V POA  Presents with RUQ tenderness, associated N/v and diarrhea  RUQ pain resolved, no N/V  Diarrhea firming up, stool studies sent today ? ?  CT w/o contrast reported inflammatory stranding in the right upper quadrant extending       anterolaterally from the second portion of the duodenum and the ascending colon. Gallstones on US, HIDA with no obstruction or evidence of cholecystitis  Normal amylase and lipase  UA with 0-4 WBC, 0-5 RBC  CT chest with minimal atelectasis, No ASD  V/Q scan was low prob  EGD with Dr Damien Gonzalez:     Esophagus:                  + Normal mucosa throughout the esophagus without any erosions, bleeding. Z line normal at 40 cm from incisors.     Stomach:                   + There was streaking erythema in the gastric body/antrum with small raised                    nodule x 2 prepyloric s/p Bxs. No blood in UGI tract,  No ulcers seen.      Duodenum:                   + Scope advanced past second portion to the 3rd portion of duodenum. Normal mucosa throughout the SB, normal villi                       no ulcers/erosions seen in duodenum.   Suspected gastroenteritis         No duodenitis or PUD         Cholelithiasis but no cholecystitis  IV empiric cefepime and flagyl x 7 days, change to PO at discharge  Starting getting OOB, wean O2  Cause of fever other night unclear  Check CXR and UA    Acute kidney injury POA Admit BUN/creat 33 and 2.05, improved  Hypokalemia K 2.4 POA low again, will supplement  Holding diuretics and losartan  IVF with resolution of MINDY, stop IVF  Avoid nephrotoxic meds  Serial labs  No hydronephrosis on CT scans     Acute on chronic diastolic CHF POA LVEF 89-00%  New Atrial fib/Flutter POA s/p DC cardioversion 6/21  Pulmonary hypertension POA PA pressure 50  Dizziness   Denies prior atrial fib or flutter  Dilt drip being transitioned to PO  Cardio consult  TSH wnl  ECHO LVEF 55-60%, normal RV function, Pulm HTN PA pressure 50  Started on eliquis  Cardioversion to NSR on 6/21  IV lasix  Check CXR  Start ambulating     Acute hypoxic resp failure 2/2 bibasilar ATX  VQ scan neg  CT chest w mild bibasilar ATX  Cont pulse ox  Check repeat CXR  IV lasix    Essential HTN POA  Hyperlipidemia POA  PO cardizem, metoprolol  Hold statin given elevated LFTs     Code Status: full   Surrogate Decision Maker:    DVT Prophylaxis: SCDs  GI Prophylaxis: not indicated  Baseline: independent        Subjective:     Chief Complaint / Reason for Physician Visit f/u abdominal pain  \"coughing\". Discussed with RN events overnight. Still with loose stools  Low grade fevers overngiht  Cardioversion to NSR today    Review of Systems:  Symptom Y/N Comments  Symptom Y/N Comments   Fever/Chills n   Chest Pain n    Poor Appetite    Edema     Cough n   Abdominal Pain y minimal   Sputum    Joint Pain     SOB/BLACKMAN n   Headache     Nausea/vomit n   Tolerating PT/OT     Diarrhea y   Tolerating Diet y    Constipation    Other       Could NOT obtain due to:      Objective:     VITALS:   Last 24hrs VS reviewed since prior progress note.  Most recent are:  Patient Vitals for the past 24 hrs:   Temp Pulse Resp BP SpO2   06/21/21 1951     100 %   06/21/21 1412     94 %   06/21/21 1344  82  129/72    06/21/21 1030 97.8 °F (36.6 °C) 74 22 139/70 92 %   06/21/21 1025  76 22 133/72 93 %   06/21/21 1020  77 20 136/72 92 %   06/21/21 1015  76 20 130/77 94 %   06/21/21 1010  76 22 (!) 141/112 93 %   06/21/21 1005  77 20 132/75 95 % 06/21/21 1000  80 22 (!) 113/58 92 %   06/21/21 0955  81 22 (!) 117/99 96 %   06/21/21 0950  82 24 (!) 120/59 94 %   06/21/21 0945  81 20 (!) 129/59 91 %   06/21/21 0940  80 18 (!) 143/66 93 %   06/21/21 0937  81 20 (!) 133/57 94 %   06/21/21 0725  (!) 113 20 123/89 97 %   06/21/21 0722     94 %   06/21/21 0243 98.6 °F (37 °C) 92 22 132/67 93 %   06/20/21 2218 98.9 °F (37.2 °C) (!) 108 20 (!) 141/77 94 %   06/20/21 2014     97 %       Intake/Output Summary (Last 24 hours) at 6/21/2021 2000  Last data filed at 6/21/2021 1921  Gross per 24 hour   Intake 930 ml   Output 400 ml   Net 530 ml        Wt Readings from Last 12 Encounters:   06/21/21 145.5 kg (320 lb 12.8 oz)   08/21/20 124.7 kg (275 lb)   04/15/20 124.7 kg (275 lb)   02/13/19 131.1 kg (289 lb)   08/28/18 134.3 kg (296 lb)   08/15/18 134.3 kg (296 lb)   08/02/18 134.3 kg (296 lb)   07/05/18 130.4 kg (287 lb 8 oz)   05/16/18 130.2 kg (287 lb)   11/04/16 134.7 kg (296 lb 15.4 oz)   04/27/15 134.7 kg (297 lb)   03/25/11 126.1 kg (278 lb)       PHYSICAL EXAM:    I had a face to face encounter and independently examined this patient on 06/21/21 as outlined below:    General: WD, WN. Alert, cooperative, no acute distress    EENT:  PERRL. Anicteric sclerae. MMM  Resp:  Crackles bilaterally, no wheezing. No accessory muscle use  CV:  Irregular  rhythm,  No edema  GI:  Soft, Non distended, mild tender in RUQ. +Bowel sounds, no rebound  Neurologic:  Alert and oriented X 3, normal speech, non focal motor exam  Psych:   Good insight. Not anxious nor agitated  Skin:  No rashes.   No jaundice    Reviewed most current lab test results and cultures  YES  Reviewed most current radiology test results   YES  Review and summation of old records today    NO  Reviewed patient's current orders and MAR    YES  PMH/SH reviewed - no change compared to H&P  ________________________________________________________________________  Care Plan discussed with:    Comments Patient x    Family      RN x    Care Manager     Consultant                        Multidiciplinary team rounds were held today with , nursing, pharmacist and clinical coordinator. Patient's plan of care was discussed; medications were reviewed and discharge planning was addressed. ________________________________________________________________________      Comments   >50% of visit spent in counseling and coordination of care     ________________________________________________________________________  Job Odom MD     Procedures: see electronic medical records for all procedures/Xrays and details which were not copied into this note but were reviewed prior to creation of Plan. LABS:  I reviewed today's most current labs and imaging studies.   Pertinent labs include:  Recent Labs     06/21/21  1120 06/19/21  0930   WBC 16.6* 11.9*   HGB 9.6* 9.5*   HCT 30.4* 30.8*    189     Recent Labs     06/21/21  1120 06/19/21  0930    136   K 3.0* 2.9*    104   CO2 28 27   * 129*   BUN 8 9   CREA 0.67 0.77   CA 8.6 8.6   MG 1.8  --    ALB 2.3*  --    TBILI 0.5  --    ALT 24  --

## 2021-06-22 NOTE — PROGRESS NOTES
ADULT PROTOCOL: JET AEROSOL  REASSESSMENT    Patient  Marcie Birch     62 y.o.   female     6/22/2021  3:37 AM    Breath Sounds Pre Procedure: Right Breath Sounds: Diminished                               Left Breath Sounds: Diminished    Breath Sounds Post Procedure: Right Breath Sounds: Diminished                                 Left Breath Sounds: Diminished    Breathing pattern: Pre procedure Breathing Pattern: Regular          Post procedure Breathing Pattern: Regular    Heart Rate: Pre procedure Pulse: 85           Post procedure Pulse: 90    Resp Rate: Pre procedure Respirations: 18           Post procedure Respirations: 18      Cough: Pre procedure Cough: Non-productive               Post procedure Cough: Non-productive    Suctioned: NO      Oxygen: O2 Device: None (Room air)   2L     Changed: NO    SpO2: Pre procedure SpO2: 94 %   with oxygen              Post procedure SpO2: 100 %  with oxygen    Nebulizer Therapy: Current medications Aerosolized Medications: Albuterol      Changed: NO    Smoking History: never    Problem List:   Patient Active Problem List   Diagnosis Code    [facility] C80.1    Plantar fasciitis M72.2    Hypovitaminosis D E55.9    Obesity, morbid (HCC) E66.01    Gastroesophageal reflux disease without esophagitis K21.9    Atrial fibrillation with RVR (HCC) I48.91    Nausea & vomiting R11.2    Diarrhea R19.7    Dehydration E86.0    Acute kidney injury (HCC) N17.9    Cholelithiasis K80.20    Arrhythmia I49.9    HTN (hypertension) I10    Dyslipidemia E78.5       Respiratory Therapist: Nazario Kennedy, RT

## 2021-06-22 NOTE — PROGRESS NOTES
Problem: Falls - Risk of  Goal: *Absence of Falls  Description: Document Onalee Gum Fall Risk and appropriate interventions in the flowsheet.   Outcome: Resolved/Not Met  Note: Fall Risk Interventions:  Mobility Interventions: Bed/chair exit alarm         Medication Interventions: Bed/chair exit alarm    Elimination Interventions: Bed/chair exit alarm    History of Falls Interventions: Bed/chair exit alarm         Problem: Patient Education: Go to Patient Education Activity  Goal: Patient/Family Education  Outcome: Resolved/Not Met     Problem: Patient Education: Go to Patient Education Activity  Goal: Patient/Family Education  Outcome: Resolved/Not Met     Problem: Afib Pathway: Day 1  Goal: Off Pathway (Use only if patient is Off Pathway)  Outcome: Resolved/Not Met  Goal: Activity/Safety  Outcome: Resolved/Not Met  Goal: Consults, if ordered  Outcome: Resolved/Not Met  Goal: Diagnostic Test/Procedures  Outcome: Resolved/Not Met  Goal: Nutrition/Diet  Outcome: Resolved/Not Met  Goal: Discharge Planning  Outcome: Resolved/Not Met  Goal: Medications  Outcome: Resolved/Not Met  Goal: Respiratory  Outcome: Resolved/Not Met  Goal: Treatments/Interventions/Procedures  Outcome: Resolved/Not Met  Goal: Psychosocial  Outcome: Resolved/Not Met  Goal: *Optimal pain control at patient's stated goal  Outcome: Resolved/Not Met  Goal: *Hemodynamically stable  Outcome: Resolved/Not Met  Goal: *Stable cardiac rhythm  Outcome: Resolved/Not Met  Goal: *Lungs clear or at baseline  Outcome: Resolved/Not Met  Goal: *Labs within defined limits  Outcome: Resolved/Not Met  Goal: *Describes available resources and support systems  Outcome: Resolved/Not Met     Problem: Afib Pathway: Day 2  Goal: Off Pathway (Use only if patient is Off Pathway)  Outcome: Resolved/Not Met  Goal: Activity/Safety  Outcome: Resolved/Not Met  Goal: Consults, if ordered  Outcome: Resolved/Not Met  Goal: Diagnostic Test/Procedures  Outcome: Resolved/Not Met  Goal: Nutrition/Diet  Outcome: Resolved/Not Met  Goal: Discharge Planning  Outcome: Resolved/Not Met  Goal: Medications  Outcome: Resolved/Not Met  Goal: Respiratory  Outcome: Resolved/Not Met  Goal: Treatments/Interventions/Procedures  Outcome: Resolved/Not Met  Goal: Psychosocial  Outcome: Resolved/Not Met  Goal: *Hemodynamically stable  Outcome: Resolved/Not Met  Goal: *Optimal pain control at patient's stated goal  Outcome: Resolved/Not Met  Goal: *Stable cardiac rhythm  Outcome: Resolved/Not Met  Goal: *Lungs clear or at baseline  Outcome: Resolved/Not Met  Goal: *Describes available resources and support systems  Outcome: Resolved/Not Met     Problem: Afib Pathway: Day 3  Goal: Off Pathway (Use only if patient is Off Pathway)  Outcome: Resolved/Not Met  Goal: Activity/Safety  Outcome: Resolved/Not Met  Goal: Diagnostic Test/Procedures  Outcome: Resolved/Not Met  Goal: Nutrition/Diet  Outcome: Resolved/Not Met  Goal: Discharge Planning  Outcome: Resolved/Not Met  Goal: Medications  Outcome: Resolved/Not Met  Goal: Respiratory  Outcome: Resolved/Not Met  Goal: Treatments/Interventions/Procedures  Outcome: Resolved/Not Met  Goal: Psychosocial  Outcome: Resolved/Not Met     Problem: Afib: Discharge Outcomes (not in CAT)  Goal: *Hemodynamically stable  Outcome: Resolved/Not Met  Goal: *Stable cardiac rhythm  Outcome: Resolved/Not Met  Goal: *Lungs clear or at baseline  Outcome: Resolved/Not Met  Goal: *Optimal pain control at patient's stated goal  Outcome: Resolved/Not Met  Goal: *Identifies cardiac risk factors  Outcome: Resolved/Not Met  Goal: *Verbalizes home exercise program, activity guidelines, cardiac precautions  Outcome: Resolved/Not Met  Goal: *Verbalizes understanding and describes prescribed diet  Outcome: Resolved/Not Met  Goal: *Verbalizes understanding and describes medication purposes and frequencies  Outcome: Resolved/Not Met  Goal: *Anxiety reduced or absent  Outcome: Resolved/Not Met  Goal: *Understands and describes signs and symptoms to report to providers(Stroke Metric)  Outcome: Resolved/Not Met  Goal: *Describes follow-up/return visits to physicians  Outcome: Resolved/Not Met  Goal: *Describes available resources and support systems  Outcome: Resolved/Not Met  Goal: *Influenza immunization  Outcome: Resolved/Not Met  Goal: *Pneumococcal immunization  Outcome: Resolved/Not Met  Goal: *Describes smoking cessation resources  Outcome: Resolved/Not Met     Problem: Pressure Injury - Risk of  Goal: *Prevention of pressure injury  Description: Document Brandon Scale and appropriate interventions in the flowsheet.   Outcome: Resolved/Not Met     Problem: Patient Education: Go to Patient Education Activity  Goal: Patient/Family Education  Outcome: Resolved/Not Met     Problem: Breathing Pattern - Ineffective  Goal: *Absence of hypoxia  Outcome: Resolved/Not Met  Goal: *Use of effective breathing techniques  Outcome: Resolved/Not Met

## 2021-06-22 NOTE — DISCHARGE INSTRUCTIONS
Patient Education        Learning About ACE Inhibitors  What are ACE inhibitors? ACE (angiotensin-converting enzyme) inhibitors are medicines that lower blood pressure. They stop the release of an enzyme. This enzyme makes your blood vessels smaller. Without it, your blood vessels relax and get bigger. This lowers your blood pressure. These medicines also increase how much water and salt go into your urine. This also lowers blood pressure. You may take this kind of medicine if you have high blood pressure. Or you may take it if you have heart problems, kidney problems, or diabetes. If you have coronary artery disease, this medicine can help prevent heart attacks and strokes. Examples  · benazepril (Lotensin)  · enalapril (Vasotec)  · lisinopril (Prinivil, Zestril)  · ramipril (Altace)  This is not a complete list.  Possible side effects  Side effects may include:  · A cough. · Low blood pressure. This can make you feel dizzy or weak. · Too much potassium in your body. · Swelling of your lips, tongue, or face. If the swelling is severe, you may need treatment right away. Severe swelling can make it hard to breathe, but this is rare. You may have other side effects or reactions not listed here. Check the information that comes with your medicine. What to know about taking this medicine  · ACE inhibitors can cause a dry cough. Talk to your doctor if you have a dry cough. You may need a different medicine. · These medicines can cause an allergic reaction. This can cause a little swelling. Or it can cause red bumps on your skin that hurt. In rare cases, the swelling may make it hard for you to breathe. · Do not take this medicine if you are pregnant or plan to become pregnant. · Take your medicines exactly as prescribed. Call your doctor if you think you are having a problem with your medicine. · Check with your doctor or pharmacist before you use any other medicines.  This includes over-the-counter medicines. Make sure your doctor knows all of the medicines, vitamins, herbal products, and supplements you take. Taking some medicines together can cause problems. · You may need regular blood tests. Where can you learn more? Go to http://www.gray.com/  Enter P050 in the search box to learn more about \"Learning About ACE Inhibitors. \"  Current as of: August 31, 2020               Content Version: 12.8  © 2006-2021 [employer]. Care instructions adapted under license by [employer] (which disclaims liability or warranty for this information). If you have questions about a medical condition or this instruction, always ask your healthcare professional. [address] any warranty or liability for your use of this information.

## 2021-06-22 NOTE — PROGRESS NOTES
End of Shift Note    Bedside shift change report given to Edwardo (oncoming nurse) by Jero Israel (offgoing nurse). Report included the following information SBAR    Shift worked:  night     Shift summary and any significant changes:     Pt refusing AM labs, would like PICC team to collect. Concerns for physician to address:        Zone phone for oncoming shift:           Activity:  Activity Level: Up with Assistance  Number times ambulated in hallways past shift: 0  Number of times OOB to chair past shift: 2    Cardiac:   Cardiac Monitoring: Yes      Cardiac Rhythm: Sinus Rhythm    Access:   Current line(s): PIV     Genitourinary:   Urinary status: voiding    Respiratory:   O2 Device: None (Room air)  Chronic home O2 use?: NO  Incentive spirometer at bedside: YES     GI:  Last Bowel Movement Date: 06/21/21  Current diet:  DIET ONE TIME MESSAGE  ADULT DIET Regular; GI [place] (GERD/Peptic Ulcer)  Passing flatus: YES  Tolerating current diet: YES       Pain Management:   Patient states pain is manageable on current regimen: YES    Skin:  Brandon Score: 19  Interventions: increase time out of bed    Patient Safety:  Fall Score:  Total Score: 2  Interventions: bed/chair alarm  High Fall Risk: Yes    Length of Stay:  Expected LOS: 3d 19h  Actual LOS: 6      Jero Israel

## 2021-06-22 NOTE — PROGRESS NOTES
[address] [place] [address]  [phone]      Cardiology Progress Note      6/22/2021 9:14 AM    Admit Date: 6/16/2021    Admit Diagnosis:   Atrial fibrillation with RVR ([address] [I48.91]    Subjective:     Demian Dawn has no c/o CP, SOB, remains in SR. One burst of Afib with RVR for approx 4 secs. GI signed off. Tolerating Eliquis.     Visit Vitals  BP (!) 140/86   Pulse 89   Temp 97.2 °F (36.2 °C)   Resp 20   Ht 5' 6\" (1.676 m)   Wt 320 lb (145.2 kg)   SpO2 97%   BMI 51.65 kg/m²       Current Facility-Administered Medications   Medication Dose Route Frequency    albuterol (PROVENTIL VENTOLIN) nebulizer solution 2.5 mg  2.5 mg Inhalation Q6H RT    furosemide (LASIX) injection 40 mg  40 mg IntraVENous DAILY    potassium bicarb-citric acid (EFFER-K) tablet 40 mEq  40 mEq Oral DAILY    metoprolol tartrate (LOPRESSOR) tablet 25 mg  25 mg Oral Q12H    dilTIAZem IR (CARDIZEM) tablet 90 mg  90 mg Oral QID    apixaban (ELIQUIS) tablet 5 mg  5 mg Oral Q12H    sodium chloride (NS) flush 5-40 mL  5-40 mL IntraVENous Q8H    sodium chloride (NS) flush 5-40 mL  5-40 mL IntraVENous PRN    pantoprazole (PROTONIX) tablet 40 mg  40 mg Oral ACB    gabapentin (NEURONTIN) capsule 100 mg  100 mg Oral TID    sertraline (ZOLOFT) tablet 100 mg  100 mg Oral DAILY    acetaminophen (TYLENOL) tablet 650 mg  650 mg Oral Q6H PRN    Or    acetaminophen (TYLENOL) suppository 650 mg  650 mg Rectal Q6H PRN    polyethylene glycol (MIRALAX) packet 17 g  17 g Oral DAILY PRN    ondansetron (ZOFRAN ODT) tablet 4 mg  4 mg Oral Q8H PRN    Or    ondansetron (ZOFRAN) injection 4 mg  4 mg IntraVENous Q6H PRN    digoxin (LANOXIN) tablet 0.125 mg  0.125 mg Oral DAILY    cefepime (MAXIPIME) 2 g in 0.9% sodium chloride (MBP/ADV) 100 mL MBP  2 g IntraVENous Q12H    metroNIDAZOLE (FLAGYL) IVPB premix 500 mg  500 mg IntraVENous Q8H    morphine injection 1 mg  1 mg IntraVENous Q4H PRN       Objective:      Physical Exam:  General Appearance:  morbidly obese  female in no acute distress  Chest:   Clear  Cardiovascular: RRR no murmur. Abdomen:   Soft, non-tender, bowel sounds are active. Extremities: no peripheral edema  Skin:  Warm and dry. Data Review:   Recent Labs     06/22/21  0947 06/21/21  1120   WBC 15.3* 16.6*   HGB 10.6* 9.6*   HCT 34.1* 30.4*    262     Recent Labs     06/21/21  1120      K 3.0*      CO2 28   *   BUN 8   CREA 0.67   CA 8.6   MG 1.8   ALB 2.3*   TBILI 0.5   ALT 24       No results for input(s): TROIQ, CPK, CKMB in the last 72 hours. Intake/Output Summary (Last 24 hours) at 6/22/2021 1035  Last data filed at 6/22/2021 0805  Gross per 24 hour   Intake 980 ml   Output 1500 ml   Net -520 ml        Telemetry: SR    Echo:  · LV: Estimated LVEF is 55 - 60%. Normal cavity size, wall thickness and systolic function (ejection fraction normal). · RV: Mildly dilated right ventricle. · MV: Mild mitral valve regurgitation is present. · PA: Mild pulmonary hypertension. Pulmonary arterial systolic pressure is 50 mmHg. Assessment:     Active Problems:    Obesity, morbid ([address] (5/16/2018)      Atrial fibrillation with RVR ([address] (6/16/2021)      Nausea & vomiting (6/17/2021)      Diarrhea (6/17/2021)      Dehydration (6/17/2021)      Acute kidney injury ([address] (6/17/2021)      Cholelithiasis (6/17/2021)      Arrhythmia (6/18/2021)      HTN (hypertension) (6/18/2021)      Dyslipidemia (6/18/2021)        Plan:     Aflutter/afib:  Successful JASMINA and cardioversion 6/21/2021  Remains in SR  Continue on Dilt 90mg QID,change to long acting Dilt today  Discontinued digoxin  Continue on metoprolol 25 mgBID  CHADS2 vasc score: 2. Continue Eliquis 5mg BID  GI oks. HGB stable. As a [address] witness, she does not accept blood products       Morbid obesity/ suspected TANVI:  · mildly dilated RV and PHTN suspected due to obesity and undiagnosed TANVI  · Rec.  outpatient sleep study    Frequent cough which has been present for over a year:  Try nebulizers in case there is a component of asthma. Evidently lisinopril was stopped for this in the past      OK for discharge. Will f/u with [facility] Cardiologist within 2 weeks. [facility]    Patient seen and examined by me with the above nurse practitioner. I personally performed all components of the history, physical, and medical decision making and agree with the assessment and plan with minor modifications as noted. Today the patient remains in NSR, feeling better. General PE  Gen:  NAD  Mental Status - Alert. General Appearance - Not in acute distress. HEENT:  PERRL, no carotid bruits or JVD  Chest and Lung Exam   Inspection: Accessory muscles - No use of accessory muscles in breathing. Auscultation:   Breath sounds: - Normal.   Cardiovascular   Inspection: Jugular vein - Bilateral - Inspection Normal.   Palpation/Percussion:   Apical Impulse: - Normal.   Auscultation: Rhythm - Regular. Heart Sounds - S1 WNL and S2 WNL. No S3 or S4. Murmurs & Other Heart Sounds: Auscultation of the heart reveals - No Murmurs. Peripheral Vascular   Upper Extremity: Inspection - Bilateral - No Cyanotic nailbeds or Digital clubbing. Lower Extremity:   Palpation: Edema - Bilateral - No edema. Abdomen:   Soft, non-tender, bowel sounds are active. Neuro: A&O times 3, CN and motor grossly WNL    Continue dilt, metoprolol and Eliquis. F/u with [facility] cardiology in 1-2 weeks. Discussed risks and benefits of anticoagulation, signs and symptoms of bleeding, and to call if any concerns.

## 2021-06-22 NOTE — PROGRESS NOTES
Transition of Care Plan:                    I met with pt who states she lives in [address] home, uses a cane; spouse is legal next of kin, has [facility] medicare, and anticipates dc to home today without dc needs. Her followup PCP sandra't is set for 6/23 at 4:10    Care Management Interventions  PCP Verified by CM:  Yes  Transition of Care Consult (CM Consult): Discharge Planning  MyChart Signup: No  Discharge Durable Medical Equipment: No  Physical Therapy Consult: Yes  Occupational Therapy Consult: No  Speech Therapy Consult: No  Current Support Network: Lives with Spouse  Confirm Follow Up Transport: Family  The Plan for Transition of Care is Related to the Following Treatment Goals : h  The Patient and/or Patient Representative was Provided with a Choice of Provider and Agrees with the Discharge Plan?: Yes  Name of the Patient Representative Who was Provided with a Choice of Provider and Agrees with the Discharge Plan: pt  Freedom of Choice List was Provided with Basic Dialogue that Supports the Patient's Individualized Plan of Care/Goals, Treatment Preferences and Shares the Quality Data Associated with the Providers?: Yes  Discharge Location  Discharge Placement: Home

## 2021-08-16 ENCOUNTER — TRANSCRIBE ORDER (OUTPATIENT)
Dept: SCHEDULING | Age: 61
End: 2021-08-16

## 2021-08-16 DIAGNOSIS — M79.672 PAIN, FOOT, LEFT, CHRONIC: Primary | ICD-10-CM

## 2021-08-16 DIAGNOSIS — G89.29 PAIN, FOOT, LEFT, CHRONIC: Primary | ICD-10-CM

## 2021-08-16 DIAGNOSIS — R22.42 LOCALIZED SWELLING OF LEFT FOOT: ICD-10-CM

## 2021-08-17 ENCOUNTER — HOSPITAL ENCOUNTER (OUTPATIENT)
Dept: MRI IMAGING | Age: 61
Discharge: HOME | End: 2021-08-17
Attending: PODIATRIST
Payer: MEDICARE

## 2021-08-17 DIAGNOSIS — G89.29 PAIN, FOOT, LEFT, CHRONIC: ICD-10-CM

## 2021-08-17 DIAGNOSIS — R22.42 LOCALIZED SWELLING OF LEFT FOOT: ICD-10-CM

## 2021-08-17 DIAGNOSIS — M79.672 PAIN, FOOT, LEFT, CHRONIC: ICD-10-CM

## 2021-08-17 PROCEDURE — 73721 MRI JNT OF LWR EXTRE W/O DYE: CPT

## 2021-09-10 ENCOUNTER — APPOINTMENT (OUTPATIENT)
Dept: ULTRASOUND IMAGING | Age: 61
End: 2021-09-10
Attending: EMERGENCY MEDICINE
Payer: MEDICARE

## 2021-09-10 ENCOUNTER — APPOINTMENT (OUTPATIENT)
Dept: GENERAL RADIOLOGY | Age: 61
End: 2021-09-10
Attending: EMERGENCY MEDICINE
Payer: MEDICARE

## 2021-09-10 ENCOUNTER — HOSPITAL ENCOUNTER (EMERGENCY)
Age: 61
Discharge: HOME | End: 2021-09-10
Attending: EMERGENCY MEDICINE
Payer: MEDICARE

## 2021-09-10 VITALS
OXYGEN SATURATION: 100 % | SYSTOLIC BLOOD PRESSURE: 120 MMHG | HEIGHT: 66 IN | TEMPERATURE: 98 F | HEART RATE: 75 BPM | DIASTOLIC BLOOD PRESSURE: 73 MMHG | WEIGHT: 293 LBS | RESPIRATION RATE: 18 BRPM | BODY MASS INDEX: 47.09 KG/M2

## 2021-09-10 DIAGNOSIS — M72.2 PLANTAR FASCIITIS: ICD-10-CM

## 2021-09-10 DIAGNOSIS — M77.9 TENDONITIS: Primary | ICD-10-CM

## 2021-09-10 LAB
ALBUMIN SERPL-MCNC: 3.3 G/DL (ref 3.5–5)
ALBUMIN/GLOB SERPL: 0.8 {RATIO} (ref 1.1–2.2)
ALP SERPL-CCNC: 98 U/L (ref 45–117)
ALT SERPL-CCNC: 25 U/L (ref 12–78)
ANION GAP SERPL CALC-SCNC: 5 MMOL/L (ref 5–15)
AST SERPL-CCNC: 22 U/L (ref 15–37)
BASOPHILS # BLD: 0 K/UL (ref 0–0.1)
BASOPHILS NFR BLD: 1 % (ref 0–1)
BILIRUB SERPL-MCNC: 0.3 MG/DL (ref 0.2–1)
BUN SERPL-MCNC: 17 MG/DL (ref 6–20)
BUN/CREAT SERPL: 16 (ref 12–20)
CALCIUM SERPL-MCNC: 8.8 MG/DL (ref 8.5–10.1)
CHLORIDE SERPL-SCNC: 106 MMOL/L (ref 97–108)
CO2 SERPL-SCNC: 27 MMOL/L (ref 21–32)
CREAT SERPL-MCNC: 1.05 MG/DL (ref 0.55–1.02)
DIFFERENTIAL METHOD BLD: ABNORMAL
EOSINOPHIL # BLD: 0.1 K/UL (ref 0–0.4)
EOSINOPHIL NFR BLD: 2 % (ref 0–7)
ERYTHROCYTE [DISTWIDTH] IN BLOOD BY AUTOMATED COUNT: 17.2 % (ref 11.5–14.5)
GLOBULIN SER CALC-MCNC: 4.4 G/DL (ref 2–4)
GLUCOSE SERPL-MCNC: 98 MG/DL (ref 65–100)
HCT VFR BLD AUTO: 37.1 % (ref 35–47)
HGB BLD-MCNC: 11.5 G/DL (ref 11.5–16)
IMM GRANULOCYTES # BLD AUTO: 0 K/UL (ref 0–0.04)
IMM GRANULOCYTES NFR BLD AUTO: 0 % (ref 0–0.5)
LYMPHOCYTES # BLD: 1.2 K/UL (ref 0.8–3.5)
LYMPHOCYTES NFR BLD: 18 % (ref 12–49)
MCH RBC QN AUTO: 26.3 PG (ref 26–34)
MCHC RBC AUTO-ENTMCNC: 31 G/DL (ref 30–36.5)
MCV RBC AUTO: 84.9 FL (ref 80–99)
MONOCYTES # BLD: 0.7 K/UL (ref 0–1)
MONOCYTES NFR BLD: 10 % (ref 5–13)
NEUTS SEG # BLD: 4.8 K/UL (ref 1.8–8)
NEUTS SEG NFR BLD: 69 % (ref 32–75)
NRBC # BLD: 0 K/UL (ref 0–0.01)
NRBC BLD-RTO: 0 PER 100 WBC
PLATELET # BLD AUTO: 210 K/UL (ref 150–400)
PMV BLD AUTO: 8.9 FL (ref 8.9–12.9)
POTASSIUM SERPL-SCNC: 4.3 MMOL/L (ref 3.5–5.1)
PROT SERPL-MCNC: 7.7 G/DL (ref 6.4–8.2)
RBC # BLD AUTO: 4.37 M/UL (ref 3.8–5.2)
SODIUM SERPL-SCNC: 138 MMOL/L (ref 136–145)
TROPONIN I SERPL-MCNC: <0.05 NG/ML
WBC # BLD AUTO: 6.9 K/UL (ref 3.6–11)

## 2021-09-10 PROCEDURE — 36415 COLL VENOUS BLD VENIPUNCTURE: CPT

## 2021-09-10 PROCEDURE — 80053 COMPREHEN METABOLIC PANEL: CPT

## 2021-09-10 PROCEDURE — 71045 X-RAY EXAM CHEST 1 VIEW: CPT

## 2021-09-10 PROCEDURE — 93005 ELECTROCARDIOGRAM TRACING: CPT

## 2021-09-10 PROCEDURE — 84484 ASSAY OF TROPONIN QUANT: CPT

## 2021-09-10 PROCEDURE — 74011250637 HC RX REV CODE- 250/637: Performed by: EMERGENCY MEDICINE

## 2021-09-10 PROCEDURE — 93971 EXTREMITY STUDY: CPT

## 2021-09-10 PROCEDURE — 85025 COMPLETE CBC W/AUTO DIFF WBC: CPT

## 2021-09-10 PROCEDURE — 99283 EMERGENCY DEPT VISIT LOW MDM: CPT

## 2021-09-10 RX ORDER — HYDROCODONE BITARTRATE AND ACETAMINOPHEN 5; 325 MG/1; MG/1
1 TABLET ORAL ONCE
Status: DISCONTINUED | OUTPATIENT
Start: 2021-09-10 | End: 2021-09-10 | Stop reason: HOSPADM

## 2021-09-10 RX ORDER — HYDROCODONE BITARTRATE AND ACETAMINOPHEN 5; 325 MG/1; MG/1
1 TABLET ORAL
Qty: 10 TABLET | Refills: 0 | Status: SHIPPED | OUTPATIENT
Start: 2021-09-10 | End: 2021-09-13

## 2021-09-10 RX ORDER — ACETAMINOPHEN 325 MG/1
650 TABLET ORAL ONCE
Status: COMPLETED | OUTPATIENT
Start: 2021-09-10 | End: 2021-09-10

## 2021-09-10 RX ADMIN — ACETAMINOPHEN 650 MG: 325 TABLET ORAL at 16:30

## 2021-09-10 NOTE — ED PROVIDER NOTES
EMERGENCY DEPARTMENT HISTORY AND PHYSICAL EXAM      Date: 9/10/2021  Patient Name: Nina Khan  Patient Age and Sex: 62 y.o. female     History of Presenting Illness     Chief Complaint   Patient presents with    Foot Pain     left foot pain for several months getting worse; ortho group saw her and wants her to get physical therapy; told she has torn tendons.  Shortness of Breath     for a couple of months sometimes the left leg gets red       History Provided By: Patient    HPI: Nina Khan is a 25-year-old history atrial fibrillation on Xarelto, metoprolol and diltiazem, chronic left foot pain presenting with left foot pain and swelling. She reports chronic moderate left foot pain and swelling for the past weeks to months. The pain is worse with weightbearing. She does believe the swelling is getting worse for the past week approximately which prompted her visit today. The swelling extends from her foot all the way up to her leg. She reports it occasionally is warm and red but this resolved spontaneously. She reports some associated shortness of breath though this is unchanged from her baseline. Reports she is not on her feet more often, still able to ambulate at home. She is followed by orthopedics for her chronic left foot pain, MRI confirmed tendon injuries and she has planned physical therapy 9/16. She reports compliance on her Xarelto. She denies any chest pain. There are no other complaints, changes, or physical findings at this time. PCP: Rafael Villavicencio, NP    No current facility-administered medications on file prior to encounter. Current Outpatient Medications on File Prior to Encounter   Medication Sig Dispense Refill    apixaban (ELIQUIS) 5 mg tablet Take 1 Tablet by mouth every twelve (12) hours. 60 Tablet 0    metoprolol tartrate (LOPRESSOR) 25 mg tablet Take 1 Tablet by mouth every twelve (12) hours.  60 Tablet 0    potassium bicarb-citric acid (EFFER-K) 20 mEq tablet Take 2 Tablets by mouth two (2) times a day. 30 Tablet 1    dilTIAZem ER (CARDIZEM CD) 300 mg capsule Take 1 Capsule by mouth daily. 30 Capsule 0    gabapentin (NEURONTIN) 100 mg capsule Take 1 Cap by mouth three (3) times daily. Max Daily Amount: 300 mg. Indications: nerve pain after herpes (Patient not taking: Reported on 2021) 90 Cap 1    tiZANidine (ZANAFLEX) 2 mg tablet TAKE 1 TABLET BY MOUTH EVERY 8 HOURS AS NEEDED 20 Tab 1    sertraline (ZOLOFT) 100 mg tablet Take 1 Tab by mouth daily. Indications: ANXIETY WITH DEPRESSION 30 Tab 1    rosuvastatin (CRESTOR) 20 mg tablet Take 1 Tab by mouth daily. 90 Tab 1    omeprazole (PRILOSEC) 40 mg capsule Take 1 Cap by mouth daily. 90 Cap 3    diclofenac (VOLTAREN) 1 % gel Apply 2 g to affected area four (4) times daily. 2 g 3       Past History     Past Medical History:  Past Medical History:   Diagnosis Date    Arrhythmia 2021    Cancer [facility]) 1986    cervical    Dyslipidemia 2021    Fibromyalgia     GERD (gastroesophageal reflux disease)     HTN (hypertension) 2021       Past Surgical History:  Past Surgical History:   Procedure Laterality Date    COLONOSCOPY N/A 2019    COLON[facility]COPY performed by [facility]vonne Zhu [facility] at [facility] ENDOSCOPY    HX  SECTION      HX GYN      cone resection       Family History:  Family History   Problem Relation Age of Onset    Hypertension Mother     Heart Disease Father     Other Sister         FMS    Pancreatic Cancer Maternal Grandfather 61       Social History:  Social History     Tobacco Use    Smoking status: Never Smoker    Smokeless tobacco: Never Used   Substance Use Topics    Alcohol use: Yes     Alcohol/week: 2.5 standard drinks     Types: 3 Cans of beer per week     Comment: socially    Drug use: No       Allergies:   Allergies   Allergen Reactions    Pcn [Penicillins] Other (comments)     \"pass out\"    Bactrim [Sulfamethoprim] Other (comments)     \"migraines\"    Metoprolol Tartrate Shortness of Breath         Review of Systems   Review of Systems   Constitutional: Negative. HENT: Negative. Eyes: Negative. Respiratory: Positive for shortness of breath. Cardiovascular: Positive for leg swelling. Gastrointestinal: Negative. Endocrine: Negative. Genitourinary: Negative. Musculoskeletal: Negative. Positive for left ankle pain, swelling   Skin: Negative. Allergic/Immunologic: Negative. Neurological: Negative. Hematological: Negative. Psychiatric/Behavioral: Negative. Physical Exam   Physical Exam   General: Alert, no acute distress  Skin: Warm, dry  Head: Normocephalic, atraumatic  Eye: EOMI, normal conjunctiva  Ears, Nose, Mouth and Throat: Oral mucosa moist  Cardiovascular: No murmur, normal peripheral perfusion, regular rhythm  Pulmonary: Normal respiratory effort, normal breath sounds  Gastrointestinal: Soft, nontender, nondistended  Musculoskeletal: Swelling to left lower extremity which is nonpitting extending from foot to knee, no deformity. No warmth, erythema to left lower extremity. Will actively range ankle knee bilaterally without pain. Neurological: Alert and oriented to person, place, situation. Normal speech observed. Moving all extremities symmetrically.   Psychiatric: Cooperative, appropriate affect    Diagnostic Study Results     Labs  Recent Results (from the past 12 hour(s))   EKG, 12 LEAD, INITIAL    Collection Time: 09/10/21  3:31 PM   Result Value Ref Range    Ventricular Rate 69 BPM    Atrial Rate 69 BPM    P-R Interval 204 ms    QRS Duration 70 ms    Q-T Interval 426 ms    QTC Calculation (Bezet) 456 ms    Calculated P Axis 47 degrees    Calculated R Axis 17 degrees    Calculated T Axis 51 degrees    Diagnosis       Normal sinus rhythm  Cannot rule out Anterior infarct , age undetermined  When compared with ECG of 21-JUN-2021 10:11,  No significant change was found     CBC WITH AUTOMATED DIFF    Collection Time: 09/10/21  3:37 PM   Result Value Ref Range    WBC 6.9 3.6 - 11.0 K/uL    RBC 4.37 3.80 - 5.20 M/uL    HGB 11.5 11.5 - 16.0 g/dL    HCT 37.1 35.0 - 47.0 %    MCV 84.9 80.0 - 99.0 FL    MCH 26.3 26.0 - 34.0 PG    MCHC 31.0 30.0 - 36.5 g/dL    RDW 17.2 (H) 11.5 - 14.5 %    PLATELET 644 619 - 717 K/uL    MPV 8.9 8.9 - 12.9 FL    NRBC 0.0 0  WBC    ABSOLUTE NRBC 0.00 0.00 - 0.01 K/uL    NEUTROPHILS 69 32 - 75 %    LYMPHOCYTES 18 12 - 49 %    MONOCYTES 10 5 - 13 %    EOSINOPHILS 2 0 - 7 %    BASOPHILS 1 0 - 1 %    IMMATURE GRANULOCYTES 0 0.0 - 0.5 %    ABS. NEUTROPHILS 4.8 1.8 - 8.0 K/UL    ABS. LYMPHOCYTES 1.2 0.8 - 3.5 K/UL    ABS. MONOCYTES 0.7 0.0 - 1.0 K/UL    ABS. EOSINOPHILS 0.1 0.0 - 0.4 K/UL    ABS. BASOPHILS 0.0 0.0 - 0.1 K/UL    ABS. IMM. GRANS. 0.0 0.00 - 0.04 K/UL    DF AUTOMATED     METABOLIC PANEL, COMPREHENSIVE    Collection Time: 09/10/21  3:37 PM   Result Value Ref Range    Sodium 138 136 - 145 mmol/L    Potassium 4.3 3.5 - 5.1 mmol/L    Chloride 106 97 - 108 mmol/L    CO2 27 21 - 32 mmol/L    Anion gap 5 5 - 15 mmol/L    Glucose 98 65 - 100 mg/dL    BUN 17 6 - 20 MG/DL    Creatinine 1.05 (H) 0.55 - 1.02 MG/DL    BUN/Creatinine ratio 16 12 - 20      GFR est AA >60 >60 ml/min/1.73m2    GFR est non-AA 53 (L) >60 ml/min/1.73m2    Calcium 8.8 8.5 - 10.1 MG/DL    Bilirubin, total 0.3 0.2 - 1.0 MG/DL    ALT (SGPT) 25 12 - 78 U/L    AST (SGOT) 22 15 - 37 U/L    Alk. phosphatase 98 45 - 117 U/L    Protein, total 7.7 6.4 - 8.2 g/dL    Albumin 3.3 (L) 3.5 - 5.0 g/dL    Globulin 4.4 (H) 2.0 - 4.0 g/dL    A-G Ratio 0.8 (L) 1.1 - 2.2     TROPONIN I    Collection Time: 09/10/21  3:37 PM   Result Value Ref Range    Troponin-I, Qt. <0.05 <0.05 ng/mL       Radiologic Studies -   XR CHEST PORT   Final Result   No acute cardiopulmonary process.          DUPLEX LOWER EXT VENOUS LEFT   Final Result        CT Results  (Last 48 hours)    None        CXR Results  (Last 48 hours)               09/10/21 1731  XR CHEST PORT Final result    Impression:  No acute cardiopulmonary process. Narrative:  EXAM: XR CHEST PORT       HISTORY: short of breath 2 months. COMPARISON: 6/21/2021       FINDINGS: Single view(s) of the chest. The lungs are well inflated. No focal   consolidation, pleural effusion, or pneumothorax. The cardiomediastinal   silhouette is unremarkable. The visualized osseous structures are unremarkable. Medical Decision Making   I am the first provider for this patient. I reviewed the vital signs, available nursing notes, past medical history, past surgical history, family history and social history. Vital Signs-Reviewed the patient's vital signs. Patient Vitals for the past 12 hrs:   Temp Pulse Resp BP SpO2   09/10/21 1523 98 °F (36.7 °C) 75 18 120/73 100 %       Records Reviewed: Nursing Notes and Old Medical Records    Provider Notes (Medical Decision Making):   81-year-old, AF on Xarelto, known tendon injury to left foot presenting with subacute swelling to the left lower extremity    Differential includes DVT, heart failure, acute on chronic irritation of known tendon injury    She is well-appearing, afebrile not tachycardic or tachypneic saturating 100% on room air with normal blood pressure 693 systolic. No acute respiratory distress on my evaluation. I doubt PE, will obtain DVT scan of left lower extremity to evaluate for the above. Will obtain chest x-ray, troponin EKG to evaluate for ACS or heart failure. Will give Tylenol hydrocodone for pain control. Will obtain CBC CMP to evaluate for leukocytosis anemia, electrolyte derangement or renal dysfunction. Disposition is likely discharge pending lab work and imaging    ED Course:   Initial assessment performed. The patients presenting problems have been discussed, and they are in agreement with the care plan formulated and outlined with them.   I have encouraged them to ask questions as they arise throughout their visit.    ED Course as of Sep 11 0135   Fri Sep 10, 2021   1615 CBC with normal white count, hemoglobin 11.5 which is stable, 10.62 months ago, platelets 552. [WB]   8221 EKG obtained demonstrates normal sinus rhythm with normal axis, borderline first-degree AV block, no ST or T wave changes concerning for acute ischemia. When compared with EKG from June 21, 2021, there is no difference    [WB]   1728 Troponin is undetectable, electrolytes, renal function and liver function is normal    [WB]   1753 Chest x-ray demonstrates no acute pulmonary edema, pneumothorax, consolidation    [WB]      ED Course User Index  [WB] Marcial Eduadro MD     DVT ultrasound without evidence of DVT. Patient discharged likely acute on chronic inflammatory process of left lower extremity    Disposition:  Discharge Note:  The patient has been re-evaluated and is ready for discharge. Reviewed available results with patient. Counseled patient on diagnosis and care plan. Patient has expressed understanding, and all questions have been answered. Patient agrees with plan and agrees to follow up as recommended, or to return to the ED if their symptoms worsen. Discharge instructions have been provided and explained to the patient, along with reasons to return to the ED. PLAN:  Discharge Medication List as of 9/10/2021  7:16 PM        2. Follow-up Information    None       3. Return to ED if worse     Diagnosis     Clinical Impression:   1. Tendonitis    2. Plantar fasciitis        Attestations:    Merrill Jo M.D. Please note that this dictation was completed with [employer], the computer voice recognition software. Quite often unanticipated grammatical, syntax, homophones, and other interpretive errors are inadvertently transcribed by the computer software. Please disregard these errors. Please excuse any errors that have escaped final proofreading. Thank you.

## 2021-09-10 NOTE — ED NOTES
Dr. Elza Brown reviewed discharge instructions with the patient. The patient verbalized understanding. All questions and concerns were addressed. The patient is discharged via wheelchair in the care of family members with instructions and prescriptions in hand. Pt is alert and oriented x 4. Respirations are clear and unlabored.

## 2021-09-11 LAB
ATRIAL RATE: 69 BPM
CALCULATED P AXIS, ECG09: 47 DEGREES
CALCULATED R AXIS, ECG10: 17 DEGREES
CALCULATED T AXIS, ECG11: 51 DEGREES
DIAGNOSIS, 93000: NORMAL
P-R INTERVAL, ECG05: 204 MS
Q-T INTERVAL, ECG07: 426 MS
QRS DURATION, ECG06: 70 MS
QTC CALCULATION (BEZET), ECG08: 456 MS
VENTRICULAR RATE, ECG03: 69 BPM

## 2022-03-18 PROBLEM — K21.9 GASTROESOPHAGEAL REFLUX DISEASE WITHOUT ESOPHAGITIS: Status: ACTIVE | Noted: 2018-05-16

## 2022-03-18 PROBLEM — E66.01 OBESITY, MORBID (HCC): Status: ACTIVE | Noted: 2018-05-16

## 2022-03-19 PROBLEM — I10 HTN (HYPERTENSION): Status: ACTIVE | Noted: 2021-06-18

## 2022-03-20 PROBLEM — E78.5 DYSLIPIDEMIA: Status: ACTIVE | Noted: 2021-06-18

## 2022-04-11 ENCOUNTER — TRANSCRIBE ORDER (OUTPATIENT)
Dept: SCHEDULING | Age: 62
End: 2022-04-11

## 2022-04-11 DIAGNOSIS — Z12.31 SCREENING MAMMOGRAM FOR HIGH-RISK PATIENT: Primary | ICD-10-CM

## 2022-06-07 ENCOUNTER — HOSPITAL ENCOUNTER (OUTPATIENT)
Dept: MAMMOGRAPHY | Age: 62
Discharge: HOME | End: 2022-06-07
Attending: NURSE PRACTITIONER
Payer: MEDICARE

## 2022-06-07 DIAGNOSIS — Z12.31 SCREENING MAMMOGRAM FOR HIGH-RISK PATIENT: ICD-10-CM

## 2022-06-07 PROCEDURE — 77067 SCR MAMMO BI INCL CAD: CPT

## 2022-09-23 ENCOUNTER — TRANSCRIBE ORDER (OUTPATIENT)
Dept: SCHEDULING | Age: 62
End: 2022-09-23

## 2022-09-23 DIAGNOSIS — S06.0X0A CONCUSSION WITH NO LOSS OF CONSCIOUSNESS: ICD-10-CM

## 2022-09-23 DIAGNOSIS — S09.90XA HEAD INJURY: Primary | ICD-10-CM

## 2023-05-16 ENCOUNTER — APPOINTMENT (OUTPATIENT)
Facility: HOSPITAL | Age: 63
End: 2023-05-16
Payer: MEDICARE

## 2023-05-16 ENCOUNTER — HOSPITAL ENCOUNTER (EMERGENCY)
Facility: HOSPITAL | Age: 63
Discharge: HOME | End: 2023-05-16
Payer: MEDICARE

## 2023-05-16 VITALS
DIASTOLIC BLOOD PRESSURE: 98 MMHG | TEMPERATURE: 97.9 F | RESPIRATION RATE: 18 BRPM | HEIGHT: 66 IN | SYSTOLIC BLOOD PRESSURE: 166 MMHG | WEIGHT: 293 LBS | HEART RATE: 78 BPM | BODY MASS INDEX: 47.09 KG/M2 | OXYGEN SATURATION: 97 %

## 2023-05-16 DIAGNOSIS — M50.30 DEGENERATIVE DISC DISEASE, CERVICAL: ICD-10-CM

## 2023-05-16 DIAGNOSIS — M54.12 CERVICAL RADICULOPATHY: Primary | ICD-10-CM

## 2023-05-16 LAB — ECHO BSA: 2.6 M2

## 2023-05-16 PROCEDURE — 99284 EMERGENCY DEPT VISIT MOD MDM: CPT

## 2023-05-16 PROCEDURE — 93971 EXTREMITY STUDY: CPT

## 2023-05-16 PROCEDURE — 72040 X-RAY EXAM NECK SPINE 2-3 VW: CPT

## 2023-05-16 RX ORDER — ACETAMINOPHEN 500 MG
1000 TABLET ORAL
Status: DISCONTINUED | OUTPATIENT
Start: 2023-05-16 | End: 2023-05-16 | Stop reason: HOSPADM

## 2023-05-16 RX ORDER — OXYCODONE HYDROCHLORIDE 5 MG/1
5 TABLET ORAL EVERY 6 HOURS PRN
Qty: 12 TABLET | Refills: 0 | Status: SHIPPED | OUTPATIENT
Start: 2023-05-16 | End: 2023-05-19

## 2023-05-16 RX ORDER — PREDNISONE 10 MG/1
TABLET ORAL
Qty: 21 TABLET | Refills: 0 | Status: SHIPPED | OUTPATIENT
Start: 2023-05-16

## 2023-05-16 ASSESSMENT — PAIN - FUNCTIONAL ASSESSMENT: PAIN_FUNCTIONAL_ASSESSMENT: 0-10

## 2023-05-16 ASSESSMENT — PAIN SCALES - GENERAL: PAINLEVEL_OUTOF10: 10

## 2023-05-16 NOTE — ED PROVIDER NOTES
[facility] EMERGENCY DEPT  EMERGENCY DEPARTMENT ENCOUNTER       Pt Name: Lorna Hanna  MRN: [id number]  [place] 1960  Date of evaluation: 2023  Provider: RAMSEY Roman   PCP: JONY Santa NP  Note Started: 5:57 PM 23     CHIEF COMPLAINT       Chief Complaint   Patient presents with    Arm Pain     PT. States R arm pain beginning 3 weeks ago. States it is throbbing and goes from shoulder to neck, down to hand. States the pain has been waking her up at night. Pt. States she has had problems with her shoulder in the past, and was given a cortisone shot in the past but denies it being helpful. HISTORY OF PRESENT ILLNESS: 1 or more elements      History From: Patient     Lorna Hanna is a 59 y.o. female who presents with right arm pain. For the last 3 to 4 weeks, the patient has had pain that radiates from her right neck down her right arm. She has associated intermittent tingling. She does feel that the arm is slightly swollen. She had similar pain a few years ago and had a cortisone injection in the shoulder without relief. She denies injury, arm weakness, fever. Nursing Notes were all reviewed and agreed with or any disagreements were addressed in the HPI. REVIEW OF SYSTEMS      Review of Systems     Positives and Pertinent negatives as per HPI.     PAST HISTORY     Past Medical History:  Past Medical History:   Diagnosis Date    Arrhythmia 2021    Cancer [facility]) 1986    cervical    Dyslipidemia 2021    Fibromyalgia     GERD (gastroesophageal reflux disease)     HTN (hypertension) 2021       Past Surgical History:  Past Surgical History:   Procedure Laterality Date     SECTION      COLONOSCOPY N/A 2019    COLONOSCOPY performed by Yahaira Jorgensen MD at [facility] ENDOSCOPY    GYN      cone resection       Family History:  Family History   Problem Relation Age of Onset    Heart Disease Father     Pancreatic Cancer Maternal Grandfather 61    Other

## 2023-05-24 ENCOUNTER — TRANSCRIBE ORDERS (OUTPATIENT)
Facility: HOSPITAL | Age: 63
End: 2023-05-24

## 2023-05-24 DIAGNOSIS — Z12.31 VISIT FOR SCREENING MAMMOGRAM: Primary | ICD-10-CM

## 2023-06-26 ENCOUNTER — HOSPITAL ENCOUNTER (OUTPATIENT)
Facility: HOSPITAL | Age: 63
Discharge: HOME | End: 2023-06-29
Payer: MEDICARE

## 2023-06-26 DIAGNOSIS — Z12.31 VISIT FOR SCREENING MAMMOGRAM: ICD-10-CM

## 2023-06-26 PROCEDURE — 77063 BREAST TOMOSYNTHESIS BI: CPT

## 2023-07-10 ENCOUNTER — HOSPITAL ENCOUNTER (EMERGENCY)
Facility: HOSPITAL | Age: 63
Discharge: HOME | End: 2023-07-10
Payer: MEDICARE

## 2023-07-10 VITALS
SYSTOLIC BLOOD PRESSURE: 139 MMHG | WEIGHT: 293 LBS | OXYGEN SATURATION: 95 % | BODY MASS INDEX: 47.09 KG/M2 | HEART RATE: 67 BPM | TEMPERATURE: 98 F | RESPIRATION RATE: 18 BRPM | DIASTOLIC BLOOD PRESSURE: 86 MMHG | HEIGHT: 66 IN

## 2023-07-10 DIAGNOSIS — M79.601 RIGHT ARM PAIN: Primary | ICD-10-CM

## 2023-07-10 DIAGNOSIS — M54.12 CERVICAL RADICULOPATHY: ICD-10-CM

## 2023-07-10 LAB
EKG ATRIAL RATE: 62 BPM
EKG DIAGNOSIS: NORMAL
EKG P AXIS: 48 DEGREES
EKG P-R INTERVAL: 198 MS
EKG Q-T INTERVAL: 440 MS
EKG QRS DURATION: 72 MS
EKG QTC CALCULATION (BAZETT): 446 MS
EKG R AXIS: 34 DEGREES
EKG T AXIS: 45 DEGREES
EKG VENTRICULAR RATE: 62 BPM

## 2023-07-10 PROCEDURE — 6370000000 HC RX 637 (ALT 250 FOR IP)

## 2023-07-10 PROCEDURE — 99283 EMERGENCY DEPT VISIT LOW MDM: CPT

## 2023-07-10 PROCEDURE — 93005 ELECTROCARDIOGRAM TRACING: CPT

## 2023-07-10 RX ORDER — METHOCARBAMOL 750 MG/1
750 TABLET, FILM COATED ORAL ONCE
Status: DISCONTINUED | OUTPATIENT
Start: 2023-07-10 | End: 2023-07-10 | Stop reason: HOSPADM

## 2023-07-10 RX ORDER — PREDNISONE 20 MG/1
60 TABLET ORAL
Status: COMPLETED | OUTPATIENT
Start: 2023-07-10 | End: 2023-07-10

## 2023-07-10 RX ORDER — HYDROCODONE BITARTRATE AND ACETAMINOPHEN 5; 325 MG/1; MG/1
1 TABLET ORAL
Status: COMPLETED | OUTPATIENT
Start: 2023-07-10 | End: 2023-07-10

## 2023-07-10 RX ORDER — PREDNISONE 10 MG/1
TABLET ORAL
Qty: 21 EACH | Refills: 0 | Status: SHIPPED | OUTPATIENT
Start: 2023-07-10

## 2023-07-10 RX ADMIN — PREDNISONE 60 MG: 20 TABLET ORAL at 09:09

## 2023-07-10 RX ADMIN — HYDROCODONE BITARTRATE AND ACETAMINOPHEN 1 TABLET: 5; 325 TABLET ORAL at 09:29

## 2023-07-10 ASSESSMENT — ENCOUNTER SYMPTOMS: COLOR CHANGE: 0

## 2023-07-10 ASSESSMENT — PAIN SCALES - GENERAL
PAINLEVEL_OUTOF10: 8
PAINLEVEL_OUTOF10: 8

## 2023-07-10 ASSESSMENT — PAIN DESCRIPTION - LOCATION: LOCATION: ARM

## 2023-07-10 ASSESSMENT — PAIN - FUNCTIONAL ASSESSMENT: PAIN_FUNCTIONAL_ASSESSMENT: 0-10

## 2023-07-10 NOTE — ED PROVIDER NOTES
[facility] EMERGENCY DEPT  EMERGENCY DEPARTMENT ENCOUNTER       Pt Name: Tatiana Boudreaux  MRN: [id number]  [address] 1960  Date of evaluation: 7/10/2023  Provider: Sindhu Morrison PA-C   PCP: JONY Gauthier NP  Note Started: 8:57 AM EDT 7/10/23     CHIEF COMPLAINT       Chief Complaint   Patient presents with    Arm Pain     Right arm pain x4-5 days with no injury or trauma that pt is aware of. Has been taking tylenol 3 with last dose @0300. Pain exacerbates with movement and laying down. Denies chest pain or sob         HISTORY OF PRESENT ILLNESS: 1 or more elements      History From: Patient  HPI Limitations: None     Tatiana Boudreaux is a 59 y.o. female  who presents with right upper arm pain and right-sided neck pain x4 days. She describes the pain as a 10 out of 10 dull, aching, throbbing pain. She complains of right arm weakness and exacerbated pain when she attempts to lift her arms to comb her hair. She reports she was seen for similar complaint in May, had an x-ray which revealed cervical degenerative disc disease was prescribed prednisone which provided relief. She states she has not yet seen a orthopedic. She reports she was prescribed physical therapy by her primary care, scheduled to start this tomorrow. She also complains of chronic left-sided back pain times many years that has not changed recently. She denies fever, chills, nausea, vomiting, headache, jaw pain, scalp pain, vision changes, vision loss, bowel/bladder incontinence, arm swelling, arm numbness, hand swelling, hand numbness, worsening pain with neck movement. Nursing Notes were all reviewed and agreed with or any disagreements were addressed in the HPI. REVIEW OF SYSTEMS      Review of Systems   Musculoskeletal:  Positive for arthralgias and myalgias. Negative for neck pain and neck stiffness. Skin:  Negative for color change and rash. Positives and Pertinent negatives as per HPI.     PAST HISTORY     Past Medical

## 2023-07-10 NOTE — ED NOTES
I have reviewed the provider's instructions with the patient, answering all questions to her satisfaction.       Rashida German RN  07/10/23 [id number]

## 2023-09-12 ENCOUNTER — HOSPITAL ENCOUNTER (INPATIENT)
Facility: HOSPITAL | Age: 63
LOS: 1 days | Discharge: HOME | DRG: 178 | End: 2023-09-13
Attending: EMERGENCY MEDICINE | Admitting: GENERAL ACUTE CARE HOSPITAL
Payer: MEDICARE

## 2023-09-12 ENCOUNTER — APPOINTMENT (OUTPATIENT)
Facility: HOSPITAL | Age: 63
DRG: 178 | End: 2023-09-12
Payer: MEDICARE

## 2023-09-12 DIAGNOSIS — R09.02 HYPOXIA: ICD-10-CM

## 2023-09-12 DIAGNOSIS — U07.1 COVID: Primary | ICD-10-CM

## 2023-09-12 PROBLEM — R06.03 ACUTE RESPIRATORY DISTRESS: Status: ACTIVE | Noted: 2023-09-12

## 2023-09-12 LAB
ALBUMIN SERPL-MCNC: 3.3 G/DL (ref 3.5–5)
ALBUMIN/GLOB SERPL: 0.8 (ref 1.1–2.2)
ALP SERPL-CCNC: 116 U/L (ref 45–117)
ALT SERPL-CCNC: 42 U/L (ref 12–78)
ANION GAP SERPL CALC-SCNC: 6 MMOL/L (ref 5–15)
APPEARANCE UR: CLEAR
AST SERPL-CCNC: 45 U/L (ref 15–37)
BACTERIA URNS QL MICRO: NEGATIVE /HPF
BASOPHILS # BLD: 0.1 K/UL (ref 0–0.1)
BASOPHILS NFR BLD: 1 % (ref 0–1)
BILIRUB SERPL-MCNC: 0.4 MG/DL (ref 0.2–1)
BILIRUB UR QL: NEGATIVE
BUN SERPL-MCNC: 11 MG/DL (ref 6–20)
BUN/CREAT SERPL: 11 (ref 12–20)
CALCIUM SERPL-MCNC: 9.1 MG/DL (ref 8.5–10.1)
CHLORIDE SERPL-SCNC: 100 MMOL/L (ref 97–108)
CO2 SERPL-SCNC: 27 MMOL/L (ref 21–32)
COLOR UR: ABNORMAL
CREAT SERPL-MCNC: 1.02 MG/DL (ref 0.55–1.02)
DIFFERENTIAL METHOD BLD: ABNORMAL
ECHO BSA: 2.65 M2
EKG ATRIAL RATE: 267 BPM
EKG DIAGNOSIS: NORMAL
EKG P AXIS: 95 DEGREES
EKG Q-T INTERVAL: 264 MS
EKG QRS DURATION: 78 MS
EKG QTC CALCULATION (BAZETT): 394 MS
EKG R AXIS: 43 DEGREES
EKG T AXIS: 95 DEGREES
EKG VENTRICULAR RATE: 134 BPM
EOSINOPHIL # BLD: 0.1 K/UL (ref 0–0.4)
EOSINOPHIL NFR BLD: 1 % (ref 0–7)
EPITH CASTS URNS QL MICRO: ABNORMAL /LPF
ERYTHROCYTE [DISTWIDTH] IN BLOOD BY AUTOMATED COUNT: 17.2 % (ref 11.5–14.5)
GLOBULIN SER CALC-MCNC: 4.4 G/DL (ref 2–4)
GLUCOSE SERPL-MCNC: 104 MG/DL (ref 65–100)
GLUCOSE UR STRIP.AUTO-MCNC: NEGATIVE MG/DL
HCT VFR BLD AUTO: 38.6 % (ref 35–47)
HGB BLD-MCNC: 12.4 G/DL (ref 11.5–16)
HGB UR QL STRIP: ABNORMAL
HYALINE CASTS URNS QL MICRO: ABNORMAL /LPF (ref 0–2)
IMM GRANULOCYTES # BLD AUTO: 0.1 K/UL (ref 0–0.04)
IMM GRANULOCYTES NFR BLD AUTO: 1 % (ref 0–0.5)
KETONES UR QL STRIP.AUTO: NEGATIVE MG/DL
LACTATE SERPL-SCNC: 1.1 MMOL/L (ref 0.4–2)
LEUKOCYTE ESTERASE UR QL STRIP.AUTO: NEGATIVE
LYMPHOCYTES # BLD: 0.6 K/UL (ref 0.8–3.5)
LYMPHOCYTES NFR BLD: 10 % (ref 12–49)
MCH RBC QN AUTO: 27.8 PG (ref 26–34)
MCHC RBC AUTO-ENTMCNC: 32.1 G/DL (ref 30–36.5)
MCV RBC AUTO: 86.5 FL (ref 80–99)
MONOCYTES # BLD: 0.9 K/UL (ref 0–1)
MONOCYTES NFR BLD: 15 % (ref 5–13)
NEUTS SEG # BLD: 4.4 K/UL (ref 1.8–8)
NEUTS SEG NFR BLD: 72 % (ref 32–75)
NITRITE UR QL STRIP.AUTO: NEGATIVE
NRBC # BLD: 0 K/UL (ref 0–0.01)
NRBC BLD-RTO: 0 PER 100 WBC
PH UR STRIP: 7.5 (ref 5–8)
PLATELET # BLD AUTO: 171 K/UL (ref 150–400)
PMV BLD AUTO: 10.4 FL (ref 8.9–12.9)
POTASSIUM SERPL-SCNC: 3.3 MMOL/L (ref 3.5–5.1)
PROCALCITONIN SERPL-MCNC: 0.05 NG/ML
PROT SERPL-MCNC: 7.7 G/DL (ref 6.4–8.2)
PROT UR STRIP-MCNC: NEGATIVE MG/DL
RBC # BLD AUTO: 4.46 M/UL (ref 3.8–5.2)
RBC #/AREA URNS HPF: ABNORMAL /HPF (ref 0–5)
RBC MORPH BLD: ABNORMAL
SARS-COV-2 RDRP RESP QL NAA+PROBE: DETECTED
SODIUM SERPL-SCNC: 133 MMOL/L (ref 136–145)
SOURCE: ABNORMAL
SP GR UR REFRACTOMETRY: 1.01
TROPONIN I SERPL HS-MCNC: 8 NG/L (ref 0–51)
URINE CULTURE IF INDICATED: ABNORMAL
UROBILINOGEN UR QL STRIP.AUTO: 0.2 EU/DL (ref 0.2–1)
WBC # BLD AUTO: 6.2 K/UL (ref 3.6–11)
WBC URNS QL MICRO: ABNORMAL /HPF (ref 0–4)

## 2023-09-12 PROCEDURE — 2580000003 HC RX 258: Performed by: NURSE PRACTITIONER

## 2023-09-12 PROCEDURE — 96376 TX/PRO/DX INJ SAME DRUG ADON: CPT

## 2023-09-12 PROCEDURE — 2500000003 HC RX 250 WO HCPCS: Performed by: EMERGENCY MEDICINE

## 2023-09-12 PROCEDURE — 96374 THER/PROPH/DIAG INJ IV PUSH: CPT

## 2023-09-12 PROCEDURE — 36415 COLL VENOUS BLD VENIPUNCTURE: CPT

## 2023-09-12 PROCEDURE — 87635 SARS-COV-2 COVID-19 AMP PRB: CPT

## 2023-09-12 PROCEDURE — 84145 PROCALCITONIN (PCT): CPT

## 2023-09-12 PROCEDURE — 81001 URINALYSIS AUTO W/SCOPE: CPT

## 2023-09-12 PROCEDURE — 6370000000 HC RX 637 (ALT 250 FOR IP): Performed by: EMERGENCY MEDICINE

## 2023-09-12 PROCEDURE — 2500000003 HC RX 250 WO HCPCS: Performed by: NURSE PRACTITIONER

## 2023-09-12 PROCEDURE — 93005 ELECTROCARDIOGRAM TRACING: CPT | Performed by: EMERGENCY MEDICINE

## 2023-09-12 PROCEDURE — 6360000002 HC RX W HCPCS: Performed by: NURSE PRACTITIONER

## 2023-09-12 PROCEDURE — 2580000003 HC RX 258: Performed by: EMERGENCY MEDICINE

## 2023-09-12 PROCEDURE — 71045 X-RAY EXAM CHEST 1 VIEW: CPT

## 2023-09-12 PROCEDURE — 83605 ASSAY OF LACTIC ACID: CPT

## 2023-09-12 PROCEDURE — 93971 EXTREMITY STUDY: CPT

## 2023-09-12 PROCEDURE — 71250 CT THORAX DX C-: CPT

## 2023-09-12 PROCEDURE — 6370000000 HC RX 637 (ALT 250 FOR IP): Performed by: NURSE PRACTITIONER

## 2023-09-12 PROCEDURE — 87040 BLOOD CULTURE FOR BACTERIA: CPT

## 2023-09-12 PROCEDURE — 2580000003 HC RX 258: Performed by: GENERAL ACUTE CARE HOSPITAL

## 2023-09-12 PROCEDURE — 84484 ASSAY OF TROPONIN QUANT: CPT

## 2023-09-12 PROCEDURE — 2500000003 HC RX 250 WO HCPCS: Performed by: GENERAL ACUTE CARE HOSPITAL

## 2023-09-12 PROCEDURE — 80053 COMPREHEN METABOLIC PANEL: CPT

## 2023-09-12 PROCEDURE — 99285 EMERGENCY DEPT VISIT HI MDM: CPT

## 2023-09-12 PROCEDURE — 85025 COMPLETE CBC W/AUTO DIFF WBC: CPT

## 2023-09-12 PROCEDURE — 2060000000 HC ICU INTERMEDIATE R&B

## 2023-09-12 RX ORDER — ACETAMINOPHEN 650 MG/1
650 SUPPOSITORY RECTAL EVERY 6 HOURS PRN
Status: DISCONTINUED | OUTPATIENT
Start: 2023-09-12 | End: 2023-09-13 | Stop reason: HOSPADM

## 2023-09-12 RX ORDER — DEXAMETHASONE 4 MG/1
6 TABLET ORAL DAILY
Status: DISCONTINUED | OUTPATIENT
Start: 2023-09-12 | End: 2023-09-13 | Stop reason: HOSPADM

## 2023-09-12 RX ORDER — LOSARTAN POTASSIUM 100 MG/1
100 TABLET ORAL DAILY
Status: DISCONTINUED | OUTPATIENT
Start: 2023-09-13 | End: 2023-09-13 | Stop reason: HOSPADM

## 2023-09-12 RX ORDER — PREDNISONE 10 MG/1
10 TABLET ORAL DAILY PRN
Status: ON HOLD | COMMUNITY
Start: 2023-07-28 | End: 2023-09-13 | Stop reason: HOSPADM

## 2023-09-12 RX ORDER — ROSUVASTATIN CALCIUM 20 MG/1
20 TABLET, COATED ORAL NIGHTLY
Status: DISCONTINUED | OUTPATIENT
Start: 2023-09-12 | End: 2023-09-13 | Stop reason: HOSPADM

## 2023-09-12 RX ORDER — POTASSIUM CHLORIDE 20 MEQ/1
40 TABLET, EXTENDED RELEASE ORAL ONCE
Status: COMPLETED | OUTPATIENT
Start: 2023-09-12 | End: 2023-09-12

## 2023-09-12 RX ORDER — ACETAMINOPHEN 500 MG
1000 TABLET ORAL
Status: COMPLETED | OUTPATIENT
Start: 2023-09-12 | End: 2023-09-12

## 2023-09-12 RX ORDER — DULOXETIN HYDROCHLORIDE 30 MG/1
30 CAPSULE, DELAYED RELEASE ORAL 2 TIMES DAILY
Status: DISCONTINUED | OUTPATIENT
Start: 2023-09-13 | End: 2023-09-13 | Stop reason: HOSPADM

## 2023-09-12 RX ORDER — ACETAMINOPHEN 325 MG/1
650 TABLET ORAL EVERY 6 HOURS PRN
Status: DISCONTINUED | OUTPATIENT
Start: 2023-09-12 | End: 2023-09-13 | Stop reason: HOSPADM

## 2023-09-12 RX ORDER — LOSARTAN POTASSIUM 100 MG/1
100 TABLET ORAL DAILY
COMMUNITY
Start: 2023-08-10

## 2023-09-12 RX ORDER — SODIUM CHLORIDE 9 MG/ML
125 INJECTION, SOLUTION INTRAVENOUS ONCE
Status: COMPLETED | OUTPATIENT
Start: 2023-09-12 | End: 2023-09-12

## 2023-09-12 RX ORDER — DILTIAZEM HYDROCHLORIDE 5 MG/ML
10 INJECTION INTRAVENOUS ONCE
Status: COMPLETED | OUTPATIENT
Start: 2023-09-12 | End: 2023-09-12

## 2023-09-12 RX ORDER — GUAIFENESIN 200 MG/10ML
200 LIQUID ORAL EVERY 4 HOURS PRN
Status: DISCONTINUED | OUTPATIENT
Start: 2023-09-12 | End: 2023-09-13 | Stop reason: HOSPADM

## 2023-09-12 RX ORDER — HYDROCHLOROTHIAZIDE 25 MG/1
12.5 TABLET ORAL DAILY
Status: DISCONTINUED | OUTPATIENT
Start: 2023-09-13 | End: 2023-09-13 | Stop reason: HOSPADM

## 2023-09-12 RX ORDER — DULOXETIN HYDROCHLORIDE 30 MG/1
30 CAPSULE, DELAYED RELEASE ORAL 2 TIMES DAILY
COMMUNITY
Start: 2023-07-17

## 2023-09-12 RX ORDER — METOPROLOL TARTRATE 50 MG/1
50 TABLET, FILM COATED ORAL 2 TIMES DAILY
Status: DISCONTINUED | OUTPATIENT
Start: 2023-09-12 | End: 2023-09-13 | Stop reason: HOSPADM

## 2023-09-12 RX ORDER — DILTIAZEM HYDROCHLORIDE 5 MG/ML
10 INJECTION INTRAVENOUS
Status: COMPLETED | OUTPATIENT
Start: 2023-09-12 | End: 2023-09-12

## 2023-09-12 RX ORDER — POLYETHYLENE GLYCOL 3350 17 G/17G
17 POWDER, FOR SOLUTION ORAL DAILY PRN
Status: DISCONTINUED | OUTPATIENT
Start: 2023-09-12 | End: 2023-09-13 | Stop reason: HOSPADM

## 2023-09-12 RX ORDER — LEVALBUTEROL TARTRATE 45 UG/1
2 AEROSOL, METERED ORAL EVERY 4 HOURS PRN
Status: DISCONTINUED | OUTPATIENT
Start: 2023-09-12 | End: 2023-09-12 | Stop reason: CLARIF

## 2023-09-12 RX ORDER — TRAZODONE HYDROCHLORIDE 50 MG/1
50 TABLET ORAL NIGHTLY PRN
Status: DISCONTINUED | OUTPATIENT
Start: 2023-09-12 | End: 2023-09-13 | Stop reason: HOSPADM

## 2023-09-12 RX ORDER — MORPHINE SULFATE 4 MG/ML
4 INJECTION, SOLUTION INTRAMUSCULAR; INTRAVENOUS EVERY 4 HOURS PRN
Status: DISCONTINUED | OUTPATIENT
Start: 2023-09-12 | End: 2023-09-13 | Stop reason: HOSPADM

## 2023-09-12 RX ORDER — TIZANIDINE 4 MG/1
2 TABLET ORAL EVERY 8 HOURS PRN
Status: DISCONTINUED | OUTPATIENT
Start: 2023-09-12 | End: 2023-09-13 | Stop reason: HOSPADM

## 2023-09-12 RX ORDER — HYDROCHLOROTHIAZIDE 12.5 MG/1
12.5 TABLET ORAL DAILY
COMMUNITY
Start: 2023-06-06

## 2023-09-12 RX ORDER — PANTOPRAZOLE SODIUM 40 MG/1
40 TABLET, DELAYED RELEASE ORAL
Status: DISCONTINUED | OUTPATIENT
Start: 2023-09-13 | End: 2023-09-13 | Stop reason: HOSPADM

## 2023-09-12 RX ORDER — SODIUM CHLORIDE 0.9 % (FLUSH) 0.9 %
5-40 SYRINGE (ML) INJECTION PRN
Status: DISCONTINUED | OUTPATIENT
Start: 2023-09-12 | End: 2023-09-13 | Stop reason: HOSPADM

## 2023-09-12 RX ORDER — ENOXAPARIN SODIUM 100 MG/ML
30 INJECTION SUBCUTANEOUS 2 TIMES DAILY
Status: DISCONTINUED | OUTPATIENT
Start: 2023-09-13 | End: 2023-09-12

## 2023-09-12 RX ORDER — DILTIAZEM HYDROCHLORIDE 100 MG/1
INJECTION, POWDER, LYOPHILIZED, FOR SOLUTION INTRAVENOUS
Status: DISPENSED
Start: 2023-09-12 | End: 2023-09-12

## 2023-09-12 RX ORDER — ALBUTEROL SULFATE 90 UG/1
2 AEROSOL, METERED RESPIRATORY (INHALATION) EVERY 4 HOURS PRN
Status: DISCONTINUED | OUTPATIENT
Start: 2023-09-12 | End: 2023-09-13 | Stop reason: HOSPADM

## 2023-09-12 RX ORDER — METOPROLOL TARTRATE 50 MG/1
50 TABLET, FILM COATED ORAL 2 TIMES DAILY
COMMUNITY
Start: 2023-08-28

## 2023-09-12 RX ORDER — ONDANSETRON 2 MG/ML
4 INJECTION INTRAMUSCULAR; INTRAVENOUS EVERY 4 HOURS PRN
Status: DISCONTINUED | OUTPATIENT
Start: 2023-09-12 | End: 2023-09-13 | Stop reason: HOSPADM

## 2023-09-12 RX ORDER — SODIUM CHLORIDE 9 MG/ML
INJECTION, SOLUTION INTRAVENOUS PRN
Status: DISCONTINUED | OUTPATIENT
Start: 2023-09-12 | End: 2023-09-13 | Stop reason: HOSPADM

## 2023-09-12 RX ORDER — GUAIFENESIN 600 MG/1
600 TABLET, EXTENDED RELEASE ORAL 2 TIMES DAILY
Status: DISCONTINUED | OUTPATIENT
Start: 2023-09-12 | End: 2023-09-13 | Stop reason: HOSPADM

## 2023-09-12 RX ORDER — 0.9 % SODIUM CHLORIDE 0.9 %
2000 INTRAVENOUS SOLUTION INTRAVENOUS ONCE
Status: COMPLETED | OUTPATIENT
Start: 2023-09-12 | End: 2023-09-12

## 2023-09-12 RX ORDER — TRAZODONE HYDROCHLORIDE 50 MG/1
50 TABLET ORAL NIGHTLY PRN
COMMUNITY
Start: 2023-06-06

## 2023-09-12 RX ORDER — SODIUM CHLORIDE 0.9 % (FLUSH) 0.9 %
5-40 SYRINGE (ML) INJECTION EVERY 12 HOURS SCHEDULED
Status: DISCONTINUED | OUTPATIENT
Start: 2023-09-12 | End: 2023-09-13 | Stop reason: HOSPADM

## 2023-09-12 RX ADMIN — GUAIFENESIN 600 MG: 600 TABLET, EXTENDED RELEASE ORAL at 22:24

## 2023-09-12 RX ADMIN — POTASSIUM CHLORIDE 40 MEQ: 1500 TABLET, EXTENDED RELEASE ORAL at 12:12

## 2023-09-12 RX ADMIN — DILTIAZEM HYDROCHLORIDE 10 MG: 5 INJECTION, SOLUTION INTRAVENOUS at 09:35

## 2023-09-12 RX ADMIN — METOPROLOL TARTRATE 50 MG: 50 TABLET ORAL at 22:24

## 2023-09-12 RX ADMIN — SODIUM CHLORIDE 2000 ML: 9 INJECTION, SOLUTION INTRAVENOUS at 07:47

## 2023-09-12 RX ADMIN — SODIUM CHLORIDE 15 MG/HR: 900 INJECTION, SOLUTION INTRAVENOUS at 17:08

## 2023-09-12 RX ADMIN — GUAIFENESIN 200 MG: 200 SOLUTION ORAL at 13:40

## 2023-09-12 RX ADMIN — ROSUVASTATIN 20 MG: 20 TABLET, FILM COATED ORAL at 22:24

## 2023-09-12 RX ADMIN — GUAIFENESIN 600 MG: 600 TABLET, EXTENDED RELEASE ORAL at 17:01

## 2023-09-12 RX ADMIN — DEXAMETHASONE 6 MG: 4 TABLET ORAL at 12:12

## 2023-09-12 RX ADMIN — SODIUM CHLORIDE, PRESERVATIVE FREE 10 ML: 5 INJECTION INTRAVENOUS at 22:27

## 2023-09-12 RX ADMIN — SODIUM CHLORIDE, PRESERVATIVE FREE 10 ML: 5 INJECTION INTRAVENOUS at 12:12

## 2023-09-12 RX ADMIN — SODIUM CHLORIDE 5 MG/HR: 900 INJECTION, SOLUTION INTRAVENOUS at 09:36

## 2023-09-12 RX ADMIN — ACETAMINOPHEN 650 MG: 325 TABLET ORAL at 17:01

## 2023-09-12 RX ADMIN — DILTIAZEM HYDROCHLORIDE 10 MG: 5 INJECTION INTRAVENOUS at 07:48

## 2023-09-12 RX ADMIN — TRAZODONE HYDROCHLORIDE 50 MG: 50 TABLET ORAL at 22:24

## 2023-09-12 RX ADMIN — SODIUM CHLORIDE 125 ML/HR: 9 INJECTION, SOLUTION INTRAVENOUS at 11:11

## 2023-09-12 RX ADMIN — ACETAMINOPHEN 1000 MG: 500 TABLET ORAL at 09:34

## 2023-09-12 RX ADMIN — APIXABAN 5 MG: 5 TABLET, FILM COATED ORAL at 22:24

## 2023-09-12 ASSESSMENT — PAIN DESCRIPTION - LOCATION
LOCATION: CHEST
LOCATION: ARM

## 2023-09-12 ASSESSMENT — PAIN DESCRIPTION - DESCRIPTORS: DESCRIPTORS: ACHING

## 2023-09-12 ASSESSMENT — PAIN SCALES - GENERAL
PAINLEVEL_OUTOF10: 5
PAINLEVEL_OUTOF10: 8

## 2023-09-12 ASSESSMENT — PAIN DESCRIPTION - ORIENTATION: ORIENTATION: MID

## 2023-09-12 NOTE — ED PROVIDER NOTES
[facility] [address] [facility]       Pt Name: Yris Bertrand  MRN: [id number]  [address] [facility] 1960  Date of evaluation: 9/12/2023  Provider: Majo Rose MD   PCP: JONY Bills - NP     [address]       Chief Complaint   Patient presents with    Nasal Congestion     Chest and nasal congestion with fever, sob x 2 days- decreased PO intake, amb to triage with cane unsteady gait    Shortness of Breath    Dizziness        HISTORY OF PRESENT ILLNESS: 1 or more elements      History From: Patient     Yris Bertrand is a 59 y.o. female who presents with shortness of breath and congestion. Patient explains her symptoms started on Sunday but the last 2 days of progressively worsened and now she cannot sleep. She had cough, short of breath and nasal congestion. She has not had any abdominal pain, nausea, vomiting or diarrhea. Nursing Notes were all reviewed and agreed with or any disagreements were addressed in the HPI. REVIEW OF SYSTEMS      Review of Systems   Constitutional:  Negative for activity change, appetite change, chills, fatigue and fever. HENT:  Positive for congestion. Eyes:  Negative for visual disturbance. Respiratory:  Positive for cough and shortness of breath. Cardiovascular:  Negative for chest pain and leg swelling. Gastrointestinal:  Negative for abdominal pain, diarrhea and vomiting. Genitourinary:  Negative for dysuria. Musculoskeletal:  Negative for gait problem. Skin:  Negative for color change and rash. Neurological:  Negative for dizziness and weakness. Positives and Pertinent negatives as per HPI.     PAST HISTORY     Past Medical History:  Past Medical History:   Diagnosis Date    Arrhythmia 6/18/2021    Cancer [facility]) 1986    cervical    Dyslipidemia 6/18/2021    Fibromyalgia     GERD (gastroesophageal reflux disease)     HTN (hypertension) 6/18/2021         Past Surgical History:  Past Surgical History:   Procedure

## 2023-09-12 NOTE — PROGRESS NOTES
Noted cardiology consult order. Please call [facility] for unassigned patients for today.

## 2023-09-12 NOTE — ED TRIAGE NOTES
Pt reprots dizziness, SOB, nasal congestion, fever, decreased appetite x 2 days--hx of afib on eliquis.

## 2023-09-12 NOTE — H&P
Hospitalist Admission Note    NAME:   Jourdan Abraham   : 1960   MRN: [id number]     Date/Time: 2023 10:34 AM    Patient PCP: JONY Castorena NP    ______________________________________________________________________  Given the patient's current clinical presentation, I have a high level of concern for decompensation if discharged from the emergency department. Complex decision making was performed, which includes reviewing the patient's available past medical records, laboratory results, and x-ray films. Discussed assessment of this patient's clinical condition and plan of care with Dr. Deepa Sharma which is as follows. Assessment / Plan:    Acute respiratory distress with hypoxia  COVID infection  Procal 0.05, Lactic 1.1  CT chest WO contrast: No acute intrathoracic findings. Minimal bibasilar atelectasis, Cholelithiasis  CXR: No acute cardiopulmonary abnormalities   - check CRP, Troponin  - Decadron 6 mg PO x 10 days  - Albuterol HFA PRN, Dulera BID   - O2 support to keep O2 sat > 92%    Hypokalemia  - monitor and replete PRN    Paroxysmal Afib  - continue Eliquis  - on Cardizem drip    Hypertension  - continue Norvasc, Losartan, , HCTZ, Metoprolol    Hyperlipidemia  - continue Rosuvastatin    Neuropathy  Chronic pain  Depression  - continue Duloxetine, Trazodone    GERD  - daily Protonix    Generalized weakness  - PT/OT consult for eval and treatment    Medical Decision Making:   I personally reviewed labs: BMP, CBC, lactic, procal, Troponin  I personally reviewed imaging: CT chest WO contrast  I personally reviewed EKG: aflutter with PVC's  Toxic drug monitoring: K+   Discussed case with: ED provider. After discussion I am in agreement that acuity of patient's medical condition necessitates hospital stay.       Code Status: Full  DVT Prophylaxis: Eliquis  GI Prophylaxis: Protonix  Baseline: cane    Subjective:   CHIEF COMPLAINT: SOB x 2 days, chest and nasal congestion

## 2023-09-12 NOTE — ED NOTES
Oxygen sats 85%--Pt placed on 2 lpm via NC---oxygen sats elevated to 96%.      Smitha Joaqiun RN  09/12/23 [id number]

## 2023-09-13 VITALS
WEIGHT: 293 LBS | HEIGHT: 66 IN | TEMPERATURE: 97.8 F | HEART RATE: 63 BPM | RESPIRATION RATE: 20 BRPM | OXYGEN SATURATION: 95 % | DIASTOLIC BLOOD PRESSURE: 81 MMHG | SYSTOLIC BLOOD PRESSURE: 155 MMHG | BODY MASS INDEX: 47.09 KG/M2

## 2023-09-13 LAB
25(OH)D3 SERPL-MCNC: 40.6 NG/ML (ref 30–100)
ALBUMIN SERPL-MCNC: 3 G/DL (ref 3.5–5)
ALBUMIN/GLOB SERPL: 0.8 (ref 1.1–2.2)
ALP SERPL-CCNC: 101 U/L (ref 45–117)
ALT SERPL-CCNC: 44 U/L (ref 12–78)
ANION GAP SERPL CALC-SCNC: 6 MMOL/L (ref 5–15)
AST SERPL-CCNC: 41 U/L (ref 15–37)
BASOPHILS # BLD: 0 K/UL (ref 0–0.1)
BASOPHILS NFR BLD: 0 % (ref 0–1)
BILIRUB SERPL-MCNC: 0.3 MG/DL (ref 0.2–1)
BUN SERPL-MCNC: 12 MG/DL (ref 6–20)
BUN/CREAT SERPL: 13 (ref 12–20)
CALCIUM SERPL-MCNC: 9.1 MG/DL (ref 8.5–10.1)
CHLORIDE SERPL-SCNC: 106 MMOL/L (ref 97–108)
CO2 SERPL-SCNC: 27 MMOL/L (ref 21–32)
CREAT SERPL-MCNC: 0.92 MG/DL (ref 0.55–1.02)
CRP SERPL-MCNC: 3.1 MG/DL (ref 0–0.6)
DIFFERENTIAL METHOD BLD: ABNORMAL
EOSINOPHIL # BLD: 0 K/UL (ref 0–0.4)
EOSINOPHIL NFR BLD: 0 % (ref 0–7)
ERYTHROCYTE [DISTWIDTH] IN BLOOD BY AUTOMATED COUNT: 17.2 % (ref 11.5–14.5)
GLOBULIN SER CALC-MCNC: 4 G/DL (ref 2–4)
GLUCOSE SERPL-MCNC: 161 MG/DL (ref 65–100)
HCT VFR BLD AUTO: 34.9 % (ref 35–47)
HGB BLD-MCNC: 11.1 G/DL (ref 11.5–16)
IMM GRANULOCYTES # BLD AUTO: 0 K/UL (ref 0–0.04)
IMM GRANULOCYTES NFR BLD AUTO: 1 % (ref 0–0.5)
LACTATE SERPL-SCNC: 1.1 MMOL/L (ref 0.4–2)
LYMPHOCYTES # BLD: 0.4 K/UL (ref 0.8–3.5)
LYMPHOCYTES NFR BLD: 13 % (ref 12–49)
MAGNESIUM SERPL-MCNC: 2.1 MG/DL (ref 1.6–2.4)
MCH RBC QN AUTO: 27.8 PG (ref 26–34)
MCHC RBC AUTO-ENTMCNC: 31.8 G/DL (ref 30–36.5)
MCV RBC AUTO: 87.3 FL (ref 80–99)
MONOCYTES # BLD: 0.3 K/UL (ref 0–1)
MONOCYTES NFR BLD: 10 % (ref 5–13)
NEUTS SEG # BLD: 2.6 K/UL (ref 1.8–8)
NEUTS SEG NFR BLD: 76 % (ref 32–75)
NRBC # BLD: 0 K/UL (ref 0–0.01)
NRBC BLD-RTO: 0 PER 100 WBC
PLATELET # BLD AUTO: 165 K/UL (ref 150–400)
PMV BLD AUTO: 9.4 FL (ref 8.9–12.9)
POTASSIUM SERPL-SCNC: 3.2 MMOL/L (ref 3.5–5.1)
PROT SERPL-MCNC: 7 G/DL (ref 6.4–8.2)
RBC # BLD AUTO: 4 M/UL (ref 3.8–5.2)
RBC MORPH BLD: ABNORMAL
SODIUM SERPL-SCNC: 139 MMOL/L (ref 136–145)
TROPONIN I SERPL HS-MCNC: 6 NG/L (ref 0–51)
WBC # BLD AUTO: 3.3 K/UL (ref 3.6–11)

## 2023-09-13 PROCEDURE — 83605 ASSAY OF LACTIC ACID: CPT

## 2023-09-13 PROCEDURE — 6370000000 HC RX 637 (ALT 250 FOR IP): Performed by: INTERNAL MEDICINE

## 2023-09-13 PROCEDURE — 84484 ASSAY OF TROPONIN QUANT: CPT

## 2023-09-13 PROCEDURE — 80053 COMPREHEN METABOLIC PANEL: CPT

## 2023-09-13 PROCEDURE — 36415 COLL VENOUS BLD VENIPUNCTURE: CPT

## 2023-09-13 PROCEDURE — 85025 COMPLETE CBC W/AUTO DIFF WBC: CPT

## 2023-09-13 PROCEDURE — 82306 VITAMIN D 25 HYDROXY: CPT

## 2023-09-13 PROCEDURE — 6370000000 HC RX 637 (ALT 250 FOR IP): Performed by: NURSE PRACTITIONER

## 2023-09-13 PROCEDURE — 2580000003 HC RX 258: Performed by: NURSE PRACTITIONER

## 2023-09-13 PROCEDURE — 86140 C-REACTIVE PROTEIN: CPT

## 2023-09-13 PROCEDURE — 83735 ASSAY OF MAGNESIUM: CPT

## 2023-09-13 PROCEDURE — 6360000002 HC RX W HCPCS: Performed by: NURSE PRACTITIONER

## 2023-09-13 RX ORDER — DEXAMETHASONE 6 MG/1
6 TABLET ORAL DAILY
Qty: 8 TABLET | Refills: 0 | Status: SHIPPED | OUTPATIENT
Start: 2023-09-14 | End: 2023-09-22

## 2023-09-13 RX ADMIN — DEXAMETHASONE 6 MG: 4 TABLET ORAL at 08:46

## 2023-09-13 RX ADMIN — ACETAMINOPHEN 650 MG: 325 TABLET ORAL at 06:42

## 2023-09-13 RX ADMIN — POTASSIUM BICARBONATE 40 MEQ: 782 TABLET, EFFERVESCENT ORAL at 08:49

## 2023-09-13 RX ADMIN — GUAIFENESIN 600 MG: 600 TABLET, EXTENDED RELEASE ORAL at 08:47

## 2023-09-13 RX ADMIN — LOSARTAN POTASSIUM 100 MG: 100 TABLET, FILM COATED ORAL at 08:47

## 2023-09-13 RX ADMIN — HYDROCHLOROTHIAZIDE 12.5 MG: 25 TABLET ORAL at 08:46

## 2023-09-13 RX ADMIN — METOPROLOL TARTRATE 50 MG: 50 TABLET ORAL at 08:46

## 2023-09-13 RX ADMIN — PANTOPRAZOLE SODIUM 40 MG: 40 TABLET, DELAYED RELEASE ORAL at 06:42

## 2023-09-13 RX ADMIN — SODIUM CHLORIDE, PRESERVATIVE FREE 10 ML: 5 INJECTION INTRAVENOUS at 08:53

## 2023-09-13 RX ADMIN — APIXABAN 5 MG: 5 TABLET, FILM COATED ORAL at 08:46

## 2023-09-13 ASSESSMENT — PAIN SCALES - GENERAL
PAINLEVEL_OUTOF10: 0
PAINLEVEL_OUTOF10: 0
PAINLEVEL_OUTOF10: 2

## 2023-09-13 ASSESSMENT — ENCOUNTER SYMPTOMS
SHORTNESS OF BREATH: 1
DIARRHEA: 0
ABDOMINAL PAIN: 0
VOMITING: 0
COUGH: 1
COLOR CHANGE: 0

## 2023-09-13 ASSESSMENT — PAIN DESCRIPTION - LOCATION: LOCATION: HEAD

## 2023-09-13 ASSESSMENT — PAIN DESCRIPTION - DESCRIPTORS: DESCRIPTORS: ACHING

## 2023-09-13 ASSESSMENT — PAIN DESCRIPTION - ORIENTATION: ORIENTATION: ANTERIOR

## 2023-09-13 ASSESSMENT — PAIN - FUNCTIONAL ASSESSMENT: PAIN_FUNCTIONAL_ASSESSMENT: ACTIVITIES ARE NOT PREVENTED

## 2023-09-13 NOTE — PROGRESS NOTES
I have reviewed discharge instructions with the patient. The patient verbalized understanding. Discharge medications reviewed with patient and appropriate educational materials and side effects teaching were provided. Follow-up appointments reviewed. Opportunity for questions and clarification was provided. Venous access removed without difficulty. Patient's belongings gathered and sent with patient. Patient is ready for discharge. Patient drove self to ED, car is in ED parking lot. Dr. Castellon Share aware patient will be driving herself home after discharge. Patient states she feels \"fine\" to drive herself home.      Julia Mitchell RN

## 2023-09-13 NOTE — PLAN OF CARE
Problem: Discharge Planning  Goal: Discharge to home or other facility with appropriate resources  9/13/2023 0205 by Shantel Long RN  Outcome: Progressing  9/12/2023 2008 by Faheem Rodriguez RN  Outcome: Progressing  Flowsheets (Taken 9/12/2023 1615)  Discharge to home or other facility with appropriate resources:   Identify barriers to discharge with patient and caregiver   Arrange for needed discharge resources and transportation as appropriate   Identify discharge learning needs (meds, wound care, etc)   Refer to discharge planning if patient needs post-hospital services based on physician order or complex needs related to functional status, cognitive ability or social support system     Problem: Pain  Goal: Verbalizes/displays adequate comfort level or baseline comfort level  9/13/2023 0205 by Shantel Long RN  Outcome: Progressing  9/12/2023 2008 by Faheem Rodriguez, RN  Outcome: Progressing     Problem: ABCDS Injury Assessment  Goal: Absence of physical injury  Outcome: Progressing

## 2023-09-13 NOTE — PLAN OF CARE
Problem: Discharge Planning  Goal: Discharge to home or other facility with appropriate resources  Outcome: Progressing  Flowsheets (Taken 9/12/2023 1615)  Discharge to home or other facility with appropriate resources:   Identify barriers to discharge with patient and caregiver   Arrange for needed discharge resources and transportation as appropriate   Identify discharge learning needs (meds, wound care, etc)   Refer to discharge planning if patient needs post-hospital services based on physician order or complex needs related to functional status, cognitive ability or social support system     Problem: Pain  Goal: Verbalizes/displays adequate comfort level or baseline comfort level  Outcome: Progressing

## 2023-09-13 NOTE — PROGRESS NOTES
Attempted to see pt for OT services. Pt reports feeling back to baseline and has been up ad giorgio using SPC and without. Pt has no mobility or ADL concerns. She did report chronic left knee pain and discussed OP PT. She was on room air and O2 sats were check by nursing prior to therapist arrival.  She is in agreement that OT and PT services aren't needed. Will sign off.

## 2023-09-13 NOTE — PROGRESS NOTES
Attempted to schedule PCP appt. Unable to schedule appt at this time due to a change in patient insurance and PCP office wants to confirm if [facility]. CM notified.  Kendell Lang, Care Management Assistant

## 2023-09-13 NOTE — PROGRESS NOTES
End of Shift Note    Bedside shift change report given to Vandana South RN (oncoming nurse) by Jennifer Moreno, RICHARD (offgoing nurse). Report included the following information SBAR, Kardex, ED Summary, Procedure Summary, Intake/Output, MAR, Recent Results, Med Rec Status, and Cardiac Rhythm Sinus Rhythm    Shift worked:  7a-7p     Shift summary and any significant changes:    Patient arrived from ED max on dilt gtt, cardiology consulted. Patient converted to NSR, dilt d/c.  Admission database completed except PTA med list. Pt did not know doses of medications she takes at home     Concerns for physician to address:       Zone phone for oncoming shift:

## 2023-09-13 NOTE — DISCHARGE SUMMARY
Discharge Summary    Name: Yuliet Bernstein  [id number]  YOB: 1960 (Age: 59 y.o.)   Date of Admission: 9/12/2023  Date of Discharge: 9/13/2023  Attending Physician: Chela De Santiago MD    Discharge Diagnosis:   Acute respiratory distress with hypoxia  COVID infection  Procal 0.05, Lactic 1.1   Hypokalemia  Paroxysmal Afib  Hypertension  Hyperlipidemia  Neuropathy  Chronic pain  Depression  GERD  Generalized weakness    Consultations:  IP CONSULT TO CARDIOLOGY      Brief Admission History/Reason for Admission Per Jarrell He MD:   Yuliet Bernstein is a 59 y.o.  female with PMHx significant for Afib (on Eliquis), hypertension, hyperlipidemia, chronic pain, GERD, neuropathy and depression with CC of SOB, fever, nasal and chest congestion that started 2 days ago. Patient reports productive cough with some mild nausea and dizziness. Denies headache, CP, abdominal pain, vomiting, dysuria, hematuria, melena, peripheral swelling and focal weakness. In ED, patient was found to be in rapid afib and was given Diltiazem IVP and started on a drip, cardiology consulted. Per Raul Mitchell PCP who provided information over the phone, patient is not following cardiologist as her afib has been controlled recently. CXR showed no acute abnormalities, tested positive for rapid COVID. We were asked to admit for work up and evaluation of the above problems. Brief Hospital Course by Main Problems:   Acute respiratory distress with hypoxia  COVID infection  Procal 0.05, Lactic 1.1   Hypokalemia  Paroxysmal Afib  Hypertension  Hyperlipidemia  Neuropathy  Chronic pain  Depression  GERD  Generalized weakness  CT chest with no acute intrathoracic findings. Minimal bibasilar atelectasis. Patient briefly required [facility] on admission and was weaned off. She was treated with decadron. Lytes were repleted. She was also found to be in afflutter with  and cardiology was consulted.   Patient how you take these medications      potassium bicarb-citric acid 20 MEQ Tbef effervescent tablet  Commonly known as: EFFER-K  Take 1 tablet by mouth daily for 30 doses  What changed:   how much to take  when to take this            CONTINUE taking these medications      apixaban 5 MG Tabs tablet  Commonly known as: ELIQUIS     diclofenac sodium 1 % Gel  Commonly known as: VOLTAREN     DULoxetine 30 MG extended release capsule  Commonly known as: CYMBALTA     hydroCHLOROthiazide 12.5 MG tablet  Commonly known as: HYDRODIURIL     losartan 100 MG tablet  Commonly known as: COZAAR     metoprolol tartrate 50 MG tablet  Commonly known as: LOPRESSOR     omeprazole 40 MG delayed release capsule  Commonly known as: PRILOSEC     rosuvastatin 20 MG tablet  Commonly known as: CRESTOR     tiZANidine 2 MG tablet  Commonly known as: ZANAFLEX     traZODone 50 MG tablet  Commonly known as: DESYREL            STOP taking these medications      dilTIAZem 300 MG extended release capsule  Commonly known as: TIAZAC     gabapentin 100 MG capsule  Commonly known as: NEURONTIN     predniSONE 10 MG (21) Tbpk     predniSONE 10 MG tablet  Commonly known as: Adeola Linder               Where to Get Your Medications        These medications were sent to [address] [place] [address] [phone] Critical access hospital [phone]  [address] [place] [address]      Phone: [phone]   dexamethasone 6 MG tablet  potassium bicarb-citric acid 20 MEQ Tbef effervescent tablet             DISPOSITION:    Home with Family: x      Home with HH/PT/OT/RN:    SNF/LTC:    ALIX:    OTHER:          Follow up with:   PCP : JONY Gutierrez NP, APRN - NP  [facility] [address] [phone]    Follow up      [facility]  [address] [facility] [address]    Follow up in 2 week(s)  Follow up of atrial flutter          Total time in

## 2023-09-14 NOTE — PROGRESS NOTES
Physician Progress Note      PATIENT:               Cherry Lay  CSN #:                  [id number]  :                       1960  ADMIT DATE:       2023 6:51 AM  DISCH DATE:        2023 1:44 PM  RESPONDING  PROVIDER #:        Fran Estrada MD          QUERY TEXT:    Patient admitted with COVID, noted to have Paroxysmal atrial fibrillation and   is maintained on Eliquis. If possible, please document in progress notes and   discharge summary if you are evaluating and/or treating any of the following:     The medical record reflects the following:  Risk Factors: COVID  Clinical Indicators: H&P note noted as Paroxysmal A fib - continue Eliquis -   on Cardizem drip  Treatment: continue Eliquis, Cardizem drip  Options provided:  -- Secondary hypercoagulable state in a patient with atrial fibrillation  -- Other - I will add my own diagnosis  -- Disagree - Not applicable / Not valid  -- Disagree - Clinically unable to determine / Unknown  -- Refer to Clinical Documentation Reviewer    PROVIDER RESPONSE TEXT:    Not my pt    Query created by: Ricco Barlow on 2023 3:33 PM      Electronically signed by:  Fran Estrada MD 2023 4:03 AM

## 2023-09-17 LAB
BACTERIA SPEC CULT: NORMAL
BACTERIA SPEC CULT: NORMAL
SERVICE CMNT-IMP: NORMAL
SERVICE CMNT-IMP: NORMAL

## 2023-09-19 NOTE — PROGRESS NOTES
Physician Progress Note      PATIENT:               Cherry Lay  CSN #:                  [id number]  :                       1960  ADMIT DATE:       2023 6:51 AM  DISCH DATE:        2023 1:44 PM  RESPONDING  PROVIDER #:        Verito Cooley MD          QUERY TEXT:    Patient admitted with COVID, noted to have Paroxysmal atrial fibrillation and   is maintained on Eliquis. If possible, please document in progress notes and   discharge summary if you are evaluating and/or treating any of the following: The medical record reflects the following:  Risk Factors: COVID  Clinical Indicators: H&P note noted as Paroxysmal A fib - continue Eliquis -   on Cardizem drip  Treatment: continue Eliquis, Cardizem drip  Options provided:  -- Secondary hypercoagulable state in a patient with atrial fibrillation  -- Other - I will add my own diagnosis  -- Disagree - Not applicable / Not valid  -- Disagree - Clinically unable to determine / Unknown  -- Refer to Clinical Documentation Reviewer    PROVIDER RESPONSE TEXT:    This patient has secondary hypercoagulable state in a patient with atrial   fibrillation.     Query created by: Ricco Barlow on 2023 9:29 AM      Electronically signed by:  Verito Cooley MD 2023 10:00 AM

## 2024-04-12 ENCOUNTER — TRANSCRIBE ORDERS (OUTPATIENT)
Facility: HOSPITAL | Age: 64
End: 2024-04-12

## 2024-04-12 DIAGNOSIS — Z12.31 VISIT FOR SCREENING MAMMOGRAM: Primary | ICD-10-CM

## 2024-07-19 ENCOUNTER — TRANSCRIBE ORDERS (OUTPATIENT)
Facility: HOSPITAL | Age: 64
End: 2024-07-19

## 2024-07-19 DIAGNOSIS — M45.6 ANKYLOSING SPONDYLITIS OF LUMBAR REGION (HCC): Primary | ICD-10-CM

## 2024-07-31 ENCOUNTER — HOSPITAL ENCOUNTER (OUTPATIENT)
Facility: HOSPITAL | Age: 64
Discharge: HOME | End: 2024-08-03
Payer: MEDICARE

## 2024-07-31 VITALS — HEIGHT: 66 IN | WEIGHT: 293 LBS | BODY MASS INDEX: 47.09 KG/M2

## 2024-07-31 DIAGNOSIS — Z12.31 VISIT FOR SCREENING MAMMOGRAM: ICD-10-CM

## 2024-07-31 PROCEDURE — 77063 BREAST TOMOSYNTHESIS BI: CPT

## 2024-09-20 ENCOUNTER — HOSPITAL ENCOUNTER (OUTPATIENT)
Facility: HOSPITAL | Age: 64
Discharge: HOME | End: 2024-09-23
Payer: MEDICARE

## 2024-09-20 DIAGNOSIS — M45.6 ANKYLOSING SPONDYLITIS OF LUMBAR REGION (HCC): ICD-10-CM

## 2024-09-20 PROCEDURE — 72148 MRI LUMBAR SPINE W/O DYE: CPT

## 2025-01-12 ENCOUNTER — APPOINTMENT (OUTPATIENT)
Facility: HOSPITAL | Age: 65
End: 2025-01-12
Payer: MEDICARE

## 2025-01-12 ENCOUNTER — HOSPITAL ENCOUNTER (EMERGENCY)
Facility: HOSPITAL | Age: 65
Discharge: HOME | End: 2025-01-12
Attending: EMERGENCY MEDICINE
Payer: MEDICARE

## 2025-01-12 VITALS
SYSTOLIC BLOOD PRESSURE: 154 MMHG | WEIGHT: 293 LBS | HEIGHT: 66 IN | RESPIRATION RATE: 17 BRPM | BODY MASS INDEX: 47.09 KG/M2 | OXYGEN SATURATION: 92 % | DIASTOLIC BLOOD PRESSURE: 73 MMHG | HEART RATE: 84 BPM | TEMPERATURE: 102.2 F

## 2025-01-12 DIAGNOSIS — E87.6 HYPOKALEMIA: ICD-10-CM

## 2025-01-12 DIAGNOSIS — R05.1 ACUTE COUGH: ICD-10-CM

## 2025-01-12 DIAGNOSIS — J10.1 INFLUENZA A: Primary | ICD-10-CM

## 2025-01-12 DIAGNOSIS — R51.9 ACUTE INTRACTABLE HEADACHE, UNSPECIFIED HEADACHE TYPE: ICD-10-CM

## 2025-01-12 LAB
ALBUMIN SERPL-MCNC: 3.4 G/DL (ref 3.5–5)
ALBUMIN/GLOB SERPL: 1.1 (ref 1.1–2.2)
ALP SERPL-CCNC: 106 U/L (ref 45–117)
ALT SERPL-CCNC: 42 U/L (ref 12–78)
ANION GAP SERPL CALC-SCNC: 7 MMOL/L (ref 2–12)
AST SERPL-CCNC: 68 U/L (ref 15–37)
BASOPHILS # BLD: 0.02 K/UL (ref 0–0.1)
BASOPHILS NFR BLD: 0.5 % (ref 0–1)
BILIRUB SERPL-MCNC: 0.4 MG/DL (ref 0.2–1)
BUN SERPL-MCNC: 11 MG/DL (ref 6–20)
BUN/CREAT SERPL: 10 (ref 12–20)
CALCIUM SERPL-MCNC: 8.4 MG/DL (ref 8.5–10.1)
CHLORIDE SERPL-SCNC: 97 MMOL/L (ref 97–108)
CO2 SERPL-SCNC: 31 MMOL/L (ref 21–32)
CREAT SERPL-MCNC: 1.08 MG/DL (ref 0.55–1.02)
DIFFERENTIAL METHOD BLD: ABNORMAL
EOSINOPHIL # BLD: 0.01 K/UL (ref 0–0.4)
EOSINOPHIL NFR BLD: 0.3 % (ref 0–7)
ERYTHROCYTE [DISTWIDTH] IN BLOOD BY AUTOMATED COUNT: 17.2 % (ref 11.5–14.5)
FLUAV RNA SPEC QL NAA+PROBE: DETECTED
FLUBV RNA SPEC QL NAA+PROBE: NOT DETECTED
GLOBULIN SER CALC-MCNC: 3.1 G/DL (ref 2–4)
GLUCOSE SERPL-MCNC: 98 MG/DL (ref 65–100)
HCT VFR BLD AUTO: 33.2 % (ref 35–47)
HGB BLD-MCNC: 11.2 G/DL (ref 11.5–16)
IMM GRANULOCYTES # BLD AUTO: 0.03 K/UL (ref 0–0.04)
IMM GRANULOCYTES NFR BLD AUTO: 0.8 % (ref 0–0.5)
LIPASE SERPL-CCNC: 33 U/L (ref 13–75)
LYMPHOCYTES # BLD: 0.24 K/UL (ref 0.8–3.5)
LYMPHOCYTES NFR BLD: 6.5 % (ref 12–49)
MCH RBC QN AUTO: 28.3 PG (ref 26–34)
MCHC RBC AUTO-ENTMCNC: 33.7 G/DL (ref 30–36.5)
MCV RBC AUTO: 83.8 FL (ref 80–99)
MONOCYTES # BLD: 0.52 K/UL (ref 0–1)
MONOCYTES NFR BLD: 14.1 % (ref 5–13)
NEUTS SEG # BLD: 2.88 K/UL (ref 1.8–8)
NEUTS SEG NFR BLD: 77.8 % (ref 32–75)
NRBC # BLD: 0 K/UL (ref 0–0.01)
NRBC BLD-RTO: 0 PER 100 WBC
PLATELET # BLD AUTO: 125 K/UL (ref 150–400)
PMV BLD AUTO: 9.4 FL (ref 8.9–12.9)
POTASSIUM SERPL-SCNC: 2.7 MMOL/L (ref 3.5–5.1)
PROT SERPL-MCNC: 6.5 G/DL (ref 6.4–8.2)
RBC # BLD AUTO: 3.96 M/UL (ref 3.8–5.2)
RBC MORPH BLD: ABNORMAL
SARS-COV-2 RNA RESP QL NAA+PROBE: NOT DETECTED
SODIUM SERPL-SCNC: 135 MMOL/L (ref 136–145)
SOURCE: ABNORMAL
TROPONIN I SERPL HS-MCNC: 7 NG/L (ref 0–51)
WBC # BLD AUTO: 3.7 K/UL (ref 3.6–11)

## 2025-01-12 PROCEDURE — 87636 SARSCOV2 & INF A&B AMP PRB: CPT

## 2025-01-12 PROCEDURE — 84484 ASSAY OF TROPONIN QUANT: CPT

## 2025-01-12 PROCEDURE — 96365 THER/PROPH/DIAG IV INF INIT: CPT

## 2025-01-12 PROCEDURE — 99285 EMERGENCY DEPT VISIT HI MDM: CPT

## 2025-01-12 PROCEDURE — 6360000002 HC RX W HCPCS: Performed by: EMERGENCY MEDICINE

## 2025-01-12 PROCEDURE — 80053 COMPREHEN METABOLIC PANEL: CPT

## 2025-01-12 PROCEDURE — 96367 TX/PROPH/DG ADDL SEQ IV INF: CPT

## 2025-01-12 PROCEDURE — 93005 ELECTROCARDIOGRAM TRACING: CPT | Performed by: EMERGENCY MEDICINE

## 2025-01-12 PROCEDURE — 96368 THER/DIAG CONCURRENT INF: CPT

## 2025-01-12 PROCEDURE — 6370000000 HC RX 637 (ALT 250 FOR IP): Performed by: EMERGENCY MEDICINE

## 2025-01-12 PROCEDURE — 71045 X-RAY EXAM CHEST 1 VIEW: CPT

## 2025-01-12 PROCEDURE — 36415 COLL VENOUS BLD VENIPUNCTURE: CPT

## 2025-01-12 PROCEDURE — 96375 TX/PRO/DX INJ NEW DRUG ADDON: CPT

## 2025-01-12 PROCEDURE — 85025 COMPLETE CBC W/AUTO DIFF WBC: CPT

## 2025-01-12 PROCEDURE — 83690 ASSAY OF LIPASE: CPT

## 2025-01-12 RX ORDER — GUAIFENESIN 600 MG/1
600 TABLET, EXTENDED RELEASE ORAL 2 TIMES DAILY
Qty: 30 TABLET | Refills: 0 | Status: ON HOLD | OUTPATIENT
Start: 2025-01-12 | End: 2025-01-27

## 2025-01-12 RX ORDER — MAGNESIUM SULFATE IN WATER 40 MG/ML
2000 INJECTION, SOLUTION INTRAVENOUS ONCE
Status: COMPLETED | OUTPATIENT
Start: 2025-01-12 | End: 2025-01-12

## 2025-01-12 RX ORDER — METHOCARBAMOL 750 MG/1
750 TABLET, FILM COATED ORAL 3 TIMES DAILY PRN
Qty: 30 TABLET | Refills: 0 | Status: SHIPPED | OUTPATIENT
Start: 2025-01-12 | End: 2025-01-17

## 2025-01-12 RX ORDER — ACETAMINOPHEN 325 MG/1
650 TABLET ORAL
Status: COMPLETED | OUTPATIENT
Start: 2025-01-12 | End: 2025-01-12

## 2025-01-12 RX ORDER — POTASSIUM CHLORIDE 1500 MG/1
20 TABLET, EXTENDED RELEASE ORAL DAILY
Qty: 30 TABLET | Refills: 0 | Status: ON HOLD | OUTPATIENT
Start: 2025-01-12

## 2025-01-12 RX ORDER — BUTALBITAL, ACETAMINOPHEN AND CAFFEINE 50; 325; 40 MG/1; MG/1; MG/1
2 TABLET ORAL EVERY 6 HOURS PRN
Qty: 20 TABLET | Refills: 1 | Status: ON HOLD | OUTPATIENT
Start: 2025-01-12

## 2025-01-12 RX ORDER — KETOROLAC TROMETHAMINE 30 MG/ML
30 INJECTION, SOLUTION INTRAMUSCULAR; INTRAVENOUS
Status: COMPLETED | OUTPATIENT
Start: 2025-01-12 | End: 2025-01-12

## 2025-01-12 RX ORDER — POTASSIUM CHLORIDE 7.45 MG/ML
10 INJECTION INTRAVENOUS
Status: COMPLETED | OUTPATIENT
Start: 2025-01-12 | End: 2025-01-12

## 2025-01-12 RX ADMIN — MAGNESIUM SULFATE HEPTAHYDRATE 2000 MG: 40 INJECTION, SOLUTION INTRAVENOUS at 17:22

## 2025-01-12 RX ADMIN — ACETAMINOPHEN 650 MG: 325 TABLET ORAL at 17:12

## 2025-01-12 RX ADMIN — KETOROLAC TROMETHAMINE 30 MG: 30 INJECTION, SOLUTION INTRAMUSCULAR at 17:14

## 2025-01-12 RX ADMIN — POTASSIUM BICARBONATE 40 MEQ: 782 TABLET, EFFERVESCENT ORAL at 17:27

## 2025-01-12 RX ADMIN — POTASSIUM CHLORIDE 10 MEQ: 7.46 INJECTION, SOLUTION INTRAVENOUS at 17:23

## 2025-01-12 ASSESSMENT — PAIN SCALES - GENERAL
PAINLEVEL_OUTOF10: 10
PAINLEVEL_OUTOF10: 6

## 2025-01-12 ASSESSMENT — PAIN DESCRIPTION - LOCATION: LOCATION: HEAD

## 2025-01-12 ASSESSMENT — LIFESTYLE VARIABLES
HOW MANY STANDARD DRINKS CONTAINING ALCOHOL DO YOU HAVE ON A TYPICAL DAY: PATIENT DOES NOT DRINK
HOW OFTEN DO YOU HAVE A DRINK CONTAINING ALCOHOL: NEVER

## 2025-01-12 NOTE — DISCHARGE INSTRUCTIONS
You are seen in the emergency department for your symptoms.  Please continue to take the potassium until you see your primary care doctor.  Please follow-up with your primary care doctor for repeat labs.    Please take Motrin Tylenol for headache and flu symptoms.  Please use the Robaxin for muscle aches and Mucinex for congestion and cough.    Return for new or worsening symptoms anytime.

## 2025-01-12 NOTE — ED PROVIDER NOTES
diagnosis, treatment, and plan.  She verbally conveys understanding and agreement of the signs, symptoms, diagnosis, treatment and prognosis and additionally agrees to follow up as discussed.  She also agrees with the care-plan and conveys that all of her questions have been answered.  I have also provided discharge instructions for her that include: educational information regarding their diagnosis and treatment, and list of reasons why they would want to return to the ED prior to their follow-up appointment, should her condition change.     CLINICAL IMPRESSION    DISPOSITION Decision To Discharge 01/12/2025 06:31:04 PM   DISPOSITION CONDITION Stable      PATIENT REFERRED TO:  Eugenie Stanford APRN - NP  [address] [facility] [address] [phone]    In 3 days      [facility] Emergency Department  [address]  [facility] [address] [phone]    If symptoms worsen       DISCHARGE MEDICATIONS:     Medication List        START taking these medications      guaiFENesin 600 MG extended release tablet  Commonly known as: MUCINEX  Take 1 tablet by mouth 2 times daily for 15 days     methocarbamol 750 MG tablet  Commonly known as: Robaxin-750  Take 1 tablet by mouth 3 times daily as needed (mucle spasm)     potassium chloride 20 MEQ extended release tablet  Commonly known as: KLOR-CON M  Take 1 tablet by mouth daily            ASK your doctor about these medications      apixaban 5 MG Tabs tablet  Commonly known as: ELIQUIS     diclofenac sodium 1 % Gel  Commonly known as: VOLTAREN     DULoxetine 30 MG extended release capsule  Commonly known as: CYMBALTA     hydroCHLOROthiazide 12.5 MG tablet     losartan 100 MG tablet  Commonly known as: COZAAR     metoprolol tartrate 50 MG tablet  Commonly known as: LOPRESSOR     omeprazole 40 MG delayed release capsule  Commonly known as: PRILOSEC     potassium bicarb-citric acid 20 MEQ Tbef effervescent tablet  Commonly known as: EFFER-K  Take

## 2025-01-13 LAB
EKG ATRIAL RATE: 91 BPM
EKG DIAGNOSIS: NORMAL
EKG P AXIS: 53 DEGREES
EKG P-R INTERVAL: 170 MS
EKG Q-T INTERVAL: 364 MS
EKG QRS DURATION: 82 MS
EKG QTC CALCULATION (BAZETT): 447 MS
EKG R AXIS: 51 DEGREES
EKG T AXIS: 63 DEGREES
EKG VENTRICULAR RATE: 91 BPM

## 2025-01-16 ENCOUNTER — APPOINTMENT (OUTPATIENT)
Facility: HOSPITAL | Age: 65
DRG: 871 | End: 2025-01-16
Payer: MEDICARE

## 2025-01-16 ENCOUNTER — HOSPITAL ENCOUNTER (INPATIENT)
Facility: HOSPITAL | Age: 65
LOS: 6 days | Discharge: HOME | DRG: 871 | End: 2025-01-23
Attending: EMERGENCY MEDICINE | Admitting: STUDENT IN AN ORGANIZED HEALTH CARE EDUCATION/TRAINING PROGRAM
Payer: MEDICARE

## 2025-01-16 DIAGNOSIS — I48.0 PAROXYSMAL ATRIAL FIBRILLATION (HCC): ICD-10-CM

## 2025-01-16 DIAGNOSIS — J10.1 INFLUENZA A: Primary | ICD-10-CM

## 2025-01-16 DIAGNOSIS — J96.01 ACUTE RESPIRATORY FAILURE WITH HYPOXIA: ICD-10-CM

## 2025-01-16 DIAGNOSIS — I48.91 ATRIAL FIBRILLATION WITH RVR (HCC): ICD-10-CM

## 2025-01-16 LAB
ALBUMIN SERPL-MCNC: 3.4 G/DL (ref 3.5–5)
ALBUMIN/GLOB SERPL: 0.9 (ref 1.1–2.2)
ALP SERPL-CCNC: 132 U/L (ref 45–117)
ALT SERPL-CCNC: 42 U/L (ref 12–78)
ANION GAP SERPL CALC-SCNC: 10 MMOL/L (ref 2–12)
AST SERPL-CCNC: 92 U/L (ref 15–37)
BASOPHILS # BLD: 0 K/UL (ref 0–0.1)
BASOPHILS NFR BLD: 0 % (ref 0–1)
BILIRUB SERPL-MCNC: 0.4 MG/DL (ref 0.2–1)
BUN SERPL-MCNC: 24 MG/DL (ref 6–20)
BUN/CREAT SERPL: 14 (ref 12–20)
CALCIUM SERPL-MCNC: 8.6 MG/DL (ref 8.5–10.1)
CHLORIDE SERPL-SCNC: 92 MMOL/L (ref 97–108)
CO2 SERPL-SCNC: 29 MMOL/L (ref 21–32)
CREAT SERPL-MCNC: 1.76 MG/DL (ref 0.55–1.02)
DIFFERENTIAL METHOD BLD: ABNORMAL
EOSINOPHIL # BLD: 0 K/UL (ref 0–0.4)
EOSINOPHIL NFR BLD: 0 % (ref 0–7)
ERYTHROCYTE [DISTWIDTH] IN BLOOD BY AUTOMATED COUNT: 16.9 % (ref 11.5–14.5)
GLOBULIN SER CALC-MCNC: 4 G/DL (ref 2–4)
GLUCOSE SERPL-MCNC: 125 MG/DL (ref 65–100)
HCT VFR BLD AUTO: 41.2 % (ref 35–47)
HGB BLD-MCNC: 13.9 G/DL (ref 11.5–16)
IMM GRANULOCYTES # BLD AUTO: 0 K/UL (ref 0–0.04)
IMM GRANULOCYTES NFR BLD AUTO: 0 % (ref 0–0.5)
LACTATE BLD-SCNC: 2.16 MMOL/L (ref 0.4–2)
LACTATE BLD-SCNC: 2.61 MMOL/L (ref 0.4–2)
LIPASE SERPL-CCNC: 176 U/L (ref 13–75)
LYMPHOCYTES # BLD: 0.26 K/UL (ref 0.8–3.5)
LYMPHOCYTES NFR BLD: 9 % (ref 12–49)
MCH RBC QN AUTO: 28 PG (ref 26–34)
MCHC RBC AUTO-ENTMCNC: 33.7 G/DL (ref 30–36.5)
MCV RBC AUTO: 82.9 FL (ref 80–99)
MONOCYTES # BLD: 0.09 K/UL (ref 0–1)
MONOCYTES NFR BLD: 3 % (ref 5–13)
NEUTS BAND NFR BLD MANUAL: 1 %
NEUTS SEG # BLD: 2.55 K/UL (ref 1.8–8)
NEUTS SEG NFR BLD: 87 % (ref 32–75)
NRBC # BLD: 0 K/UL (ref 0–0.01)
NRBC BLD-RTO: 0 PER 100 WBC
NT PRO BNP: 1241 PG/ML
PLATELET # BLD AUTO: 118 K/UL (ref 150–400)
PMV BLD AUTO: 11.3 FL (ref 8.9–12.9)
POTASSIUM SERPL-SCNC: 2.9 MMOL/L (ref 3.5–5.1)
PROCALCITONIN SERPL-MCNC: 0.29 NG/ML
PROT SERPL-MCNC: 7.4 G/DL (ref 6.4–8.2)
RBC # BLD AUTO: 4.97 M/UL (ref 3.8–5.2)
RBC MORPH BLD: ABNORMAL
SODIUM SERPL-SCNC: 131 MMOL/L (ref 136–145)
TROPONIN I SERPL HS-MCNC: 16 NG/L (ref 0–51)
WBC # BLD AUTO: 2.9 K/UL (ref 3.6–11)

## 2025-01-16 PROCEDURE — 85025 COMPLETE CBC W/AUTO DIFF WBC: CPT

## 2025-01-16 PROCEDURE — 83605 ASSAY OF LACTIC ACID: CPT

## 2025-01-16 PROCEDURE — 6370000000 HC RX 637 (ALT 250 FOR IP): Performed by: EMERGENCY MEDICINE

## 2025-01-16 PROCEDURE — 80053 COMPREHEN METABOLIC PANEL: CPT

## 2025-01-16 PROCEDURE — 84145 PROCALCITONIN (PCT): CPT

## 2025-01-16 PROCEDURE — 99285 EMERGENCY DEPT VISIT HI MDM: CPT

## 2025-01-16 PROCEDURE — 71045 X-RAY EXAM CHEST 1 VIEW: CPT

## 2025-01-16 PROCEDURE — 83690 ASSAY OF LIPASE: CPT

## 2025-01-16 PROCEDURE — 36415 COLL VENOUS BLD VENIPUNCTURE: CPT

## 2025-01-16 PROCEDURE — 2580000003 HC RX 258: Performed by: EMERGENCY MEDICINE

## 2025-01-16 PROCEDURE — 87040 BLOOD CULTURE FOR BACTERIA: CPT

## 2025-01-16 PROCEDURE — 84484 ASSAY OF TROPONIN QUANT: CPT

## 2025-01-16 PROCEDURE — 83880 ASSAY OF NATRIURETIC PEPTIDE: CPT

## 2025-01-16 RX ORDER — 0.9 % SODIUM CHLORIDE 0.9 %
30 INTRAVENOUS SOLUTION INTRAVENOUS ONCE
Status: COMPLETED | OUTPATIENT
Start: 2025-01-16 | End: 2025-01-17

## 2025-01-16 RX ORDER — OSELTAMIVIR PHOSPHATE 75 MG/1
75 CAPSULE ORAL
Status: COMPLETED | OUTPATIENT
Start: 2025-01-16 | End: 2025-01-16

## 2025-01-16 RX ADMIN — OSELTAMIVIR PHOSPHATE 75 MG: 75 CAPSULE ORAL at 22:24

## 2025-01-16 RX ADMIN — SODIUM CHLORIDE 4083 ML: 0.9 INJECTION, SOLUTION INTRAVENOUS at 21:26

## 2025-01-16 ASSESSMENT — PAIN - FUNCTIONAL ASSESSMENT: PAIN_FUNCTIONAL_ASSESSMENT: NONE - DENIES PAIN

## 2025-01-17 PROBLEM — J10.1 INFLUENZA A: Status: ACTIVE | Noted: 2025-01-17

## 2025-01-17 LAB
ANION GAP SERPL CALC-SCNC: 6 MMOL/L (ref 2–12)
ANION GAP SERPL CALC-SCNC: 8 MMOL/L (ref 2–12)
BASE EXCESS BLDV CALC-SCNC: 2.2 MMOL/L
BASOPHILS # BLD: 0 K/UL (ref 0–0.1)
BASOPHILS NFR BLD: 0 % (ref 0–1)
BDY SITE: ABNORMAL
BUN SERPL-MCNC: 13 MG/DL (ref 6–20)
BUN SERPL-MCNC: 17 MG/DL (ref 6–20)
BUN/CREAT SERPL: 11 (ref 12–20)
BUN/CREAT SERPL: 14 (ref 12–20)
CALCIUM SERPL-MCNC: 7.9 MG/DL (ref 8.5–10.1)
CALCIUM SERPL-MCNC: 8.5 MG/DL (ref 8.5–10.1)
CHLORIDE SERPL-SCNC: 98 MMOL/L (ref 97–108)
CHLORIDE SERPL-SCNC: 99 MMOL/L (ref 97–108)
CO2 SERPL-SCNC: 26 MMOL/L (ref 21–32)
CO2 SERPL-SCNC: 27 MMOL/L (ref 21–32)
CREAT SERPL-MCNC: 1.16 MG/DL (ref 0.55–1.02)
CREAT SERPL-MCNC: 1.22 MG/DL (ref 0.55–1.02)
DIFFERENTIAL METHOD BLD: ABNORMAL
EOSINOPHIL # BLD: 0 K/UL (ref 0–0.4)
EOSINOPHIL NFR BLD: 0 % (ref 0–7)
ERYTHROCYTE [DISTWIDTH] IN BLOOD BY AUTOMATED COUNT: 17 % (ref 11.5–14.5)
GLUCOSE SERPL-MCNC: 116 MG/DL (ref 65–100)
GLUCOSE SERPL-MCNC: 158 MG/DL (ref 65–100)
HCO3 BLDV-SCNC: 27 MMOL/L (ref 23–28)
HCT VFR BLD AUTO: 34.9 % (ref 35–47)
HGB BLD-MCNC: 11.6 G/DL (ref 11.5–16)
IMM GRANULOCYTES # BLD AUTO: 0 K/UL (ref 0–0.04)
IMM GRANULOCYTES NFR BLD AUTO: 0 % (ref 0–0.5)
LACTATE BLD-SCNC: 0.89 MMOL/L (ref 0.4–2)
LYMPHOCYTES # BLD: 0.35 K/UL (ref 0.8–3.5)
LYMPHOCYTES NFR BLD: 15 % (ref 12–49)
MAGNESIUM SERPL-MCNC: 2.5 MG/DL (ref 1.6–2.4)
MCH RBC QN AUTO: 27.8 PG (ref 26–34)
MCHC RBC AUTO-ENTMCNC: 33.2 G/DL (ref 30–36.5)
MCV RBC AUTO: 83.5 FL (ref 80–99)
MONOCYTES # BLD: 0.18 K/UL (ref 0–1)
MONOCYTES NFR BLD: 8 % (ref 5–13)
NEUTS SEG # BLD: 1.77 K/UL (ref 1.8–8)
NEUTS SEG NFR BLD: 77 % (ref 32–75)
NRBC # BLD: 0 K/UL (ref 0–0.01)
NRBC BLD-RTO: 0 PER 100 WBC
PCO2 BLDV: 41.4 MMHG (ref 41–51)
PH BLDV: 7.43 (ref 7.32–7.42)
PLATELET # BLD AUTO: 89 K/UL (ref 150–400)
PMV BLD AUTO: 10.8 FL (ref 8.9–12.9)
PO2 BLDV: 30 MMHG (ref 25–40)
POTASSIUM SERPL-SCNC: 2.5 MMOL/L (ref 3.5–5.1)
POTASSIUM SERPL-SCNC: 3.2 MMOL/L (ref 3.5–5.1)
RBC # BLD AUTO: 4.18 M/UL (ref 3.8–5.2)
RBC MORPH BLD: ABNORMAL
SAO2 % BLDV: 58 % (ref 65–88)
SAO2% DEVICE SAO2% SENSOR NAME: ABNORMAL
SODIUM SERPL-SCNC: 132 MMOL/L (ref 136–145)
SODIUM SERPL-SCNC: 132 MMOL/L (ref 136–145)
SPECIMEN SITE: ABNORMAL
WBC # BLD AUTO: 2.3 K/UL (ref 3.6–11)

## 2025-01-17 PROCEDURE — 85025 COMPLETE CBC W/AUTO DIFF WBC: CPT

## 2025-01-17 PROCEDURE — 6370000000 HC RX 637 (ALT 250 FOR IP): Performed by: STUDENT IN AN ORGANIZED HEALTH CARE EDUCATION/TRAINING PROGRAM

## 2025-01-17 PROCEDURE — 6370000000 HC RX 637 (ALT 250 FOR IP): Performed by: NURSE PRACTITIONER

## 2025-01-17 PROCEDURE — 2500000003 HC RX 250 WO HCPCS: Performed by: STUDENT IN AN ORGANIZED HEALTH CARE EDUCATION/TRAINING PROGRAM

## 2025-01-17 PROCEDURE — 5A09357 ASSISTANCE WITH RESPIRATORY VENTILATION, LESS THAN 24 CONSECUTIVE HOURS, CONTINUOUS POSITIVE AIRWAY PRESSURE: ICD-10-PCS | Performed by: STUDENT IN AN ORGANIZED HEALTH CARE EDUCATION/TRAINING PROGRAM

## 2025-01-17 PROCEDURE — 83735 ASSAY OF MAGNESIUM: CPT

## 2025-01-17 PROCEDURE — 2580000003 HC RX 258: Performed by: STUDENT IN AN ORGANIZED HEALTH CARE EDUCATION/TRAINING PROGRAM

## 2025-01-17 PROCEDURE — 2060000000 HC ICU INTERMEDIATE R&B

## 2025-01-17 PROCEDURE — 6360000002 HC RX W HCPCS: Performed by: STUDENT IN AN ORGANIZED HEALTH CARE EDUCATION/TRAINING PROGRAM

## 2025-01-17 PROCEDURE — 80048 BASIC METABOLIC PNL TOTAL CA: CPT

## 2025-01-17 PROCEDURE — 82803 BLOOD GASES ANY COMBINATION: CPT

## 2025-01-17 PROCEDURE — 94660 CPAP INITIATION&MGMT: CPT

## 2025-01-17 PROCEDURE — 83605 ASSAY OF LACTIC ACID: CPT

## 2025-01-17 PROCEDURE — 36415 COLL VENOUS BLD VENIPUNCTURE: CPT

## 2025-01-17 RX ORDER — OSELTAMIVIR PHOSPHATE 30 MG/1
30 CAPSULE ORAL ONCE
Status: DISCONTINUED | OUTPATIENT
Start: 2025-01-17 | End: 2025-01-19

## 2025-01-17 RX ORDER — POTASSIUM CHLORIDE 7.45 MG/ML
10 INJECTION INTRAVENOUS
Status: ACTIVE | OUTPATIENT
Start: 2025-01-17 | End: 2025-01-17

## 2025-01-17 RX ORDER — ROSUVASTATIN CALCIUM 20 MG/1
20 TABLET, COATED ORAL NIGHTLY
Status: DISCONTINUED | OUTPATIENT
Start: 2025-01-17 | End: 2025-01-23 | Stop reason: HOSPADM

## 2025-01-17 RX ORDER — SODIUM CHLORIDE 0.9 % (FLUSH) 0.9 %
5-40 SYRINGE (ML) INJECTION PRN
Status: DISCONTINUED | OUTPATIENT
Start: 2025-01-17 | End: 2025-01-23 | Stop reason: HOSPADM

## 2025-01-17 RX ORDER — HYDROCHLOROTHIAZIDE 25 MG/1
12.5 TABLET ORAL DAILY
Status: DISCONTINUED | OUTPATIENT
Start: 2025-01-17 | End: 2025-01-23 | Stop reason: HOSPADM

## 2025-01-17 RX ORDER — SODIUM CHLORIDE 0.9 % (FLUSH) 0.9 %
5-40 SYRINGE (ML) INJECTION EVERY 12 HOURS SCHEDULED
Status: DISCONTINUED | OUTPATIENT
Start: 2025-01-17 | End: 2025-01-23 | Stop reason: HOSPADM

## 2025-01-17 RX ORDER — POTASSIUM CHLORIDE 1500 MG/1
40 TABLET, EXTENDED RELEASE ORAL 2 TIMES DAILY WITH MEALS
Status: COMPLETED | OUTPATIENT
Start: 2025-01-17 | End: 2025-01-17

## 2025-01-17 RX ORDER — AMIODARONE HYDROCHLORIDE 200 MG/1
400 TABLET ORAL 2 TIMES DAILY
Status: DISCONTINUED | OUTPATIENT
Start: 2025-01-17 | End: 2025-01-21

## 2025-01-17 RX ORDER — MAGNESIUM SULFATE IN WATER 40 MG/ML
2000 INJECTION, SOLUTION INTRAVENOUS ONCE
Status: COMPLETED | OUTPATIENT
Start: 2025-01-17 | End: 2025-01-17

## 2025-01-17 RX ORDER — AZITHROMYCIN 250 MG/1
500 TABLET, FILM COATED ORAL DAILY
Status: COMPLETED | OUTPATIENT
Start: 2025-01-17 | End: 2025-01-17

## 2025-01-17 RX ORDER — TRAZODONE HYDROCHLORIDE 50 MG/1
50 TABLET, FILM COATED ORAL NIGHTLY PRN
Status: DISCONTINUED | OUTPATIENT
Start: 2025-01-17 | End: 2025-01-23 | Stop reason: HOSPADM

## 2025-01-17 RX ORDER — SODIUM CHLORIDE 9 MG/ML
INJECTION, SOLUTION INTRAVENOUS PRN
Status: DISCONTINUED | OUTPATIENT
Start: 2025-01-17 | End: 2025-01-23 | Stop reason: HOSPADM

## 2025-01-17 RX ORDER — METOPROLOL TARTRATE 25 MG/1
12.5 TABLET, FILM COATED ORAL 2 TIMES DAILY
Status: DISCONTINUED | OUTPATIENT
Start: 2025-01-17 | End: 2025-01-23 | Stop reason: HOSPADM

## 2025-01-17 RX ORDER — PANTOPRAZOLE SODIUM 40 MG/1
40 TABLET, DELAYED RELEASE ORAL
Status: DISCONTINUED | OUTPATIENT
Start: 2025-01-17 | End: 2025-01-23 | Stop reason: HOSPADM

## 2025-01-17 RX ORDER — POLYETHYLENE GLYCOL 3350 17 G/17G
17 POWDER, FOR SOLUTION ORAL DAILY PRN
Status: DISCONTINUED | OUTPATIENT
Start: 2025-01-17 | End: 2025-01-23 | Stop reason: HOSPADM

## 2025-01-17 RX ORDER — POTASSIUM CHLORIDE 7.45 MG/ML
10 INJECTION INTRAVENOUS
Status: DISCONTINUED | OUTPATIENT
Start: 2025-01-17 | End: 2025-01-17

## 2025-01-17 RX ORDER — LOSARTAN POTASSIUM 100 MG/1
100 TABLET ORAL DAILY
Status: DISCONTINUED | OUTPATIENT
Start: 2025-01-17 | End: 2025-01-23 | Stop reason: HOSPADM

## 2025-01-17 RX ORDER — ONDANSETRON 2 MG/ML
4 INJECTION INTRAMUSCULAR; INTRAVENOUS EVERY 6 HOURS PRN
Status: DISCONTINUED | OUTPATIENT
Start: 2025-01-17 | End: 2025-01-23 | Stop reason: HOSPADM

## 2025-01-17 RX ORDER — OSELTAMIVIR PHOSPHATE 30 MG/1
30 CAPSULE ORAL 2 TIMES DAILY
Status: DISCONTINUED | OUTPATIENT
Start: 2025-01-17 | End: 2025-01-17

## 2025-01-17 RX ORDER — METOPROLOL TARTRATE 25 MG/1
25 TABLET, FILM COATED ORAL 2 TIMES DAILY
Status: DISCONTINUED | OUTPATIENT
Start: 2025-01-17 | End: 2025-01-17

## 2025-01-17 RX ORDER — ACETAMINOPHEN 325 MG/1
650 TABLET ORAL EVERY 6 HOURS PRN
Status: DISCONTINUED | OUTPATIENT
Start: 2025-01-17 | End: 2025-01-23 | Stop reason: HOSPADM

## 2025-01-17 RX ORDER — ACETAMINOPHEN 650 MG/1
650 SUPPOSITORY RECTAL EVERY 6 HOURS PRN
Status: DISCONTINUED | OUTPATIENT
Start: 2025-01-17 | End: 2025-01-23 | Stop reason: HOSPADM

## 2025-01-17 RX ORDER — OSELTAMIVIR PHOSPHATE 75 MG/1
75 CAPSULE ORAL 2 TIMES DAILY
Status: COMPLETED | OUTPATIENT
Start: 2025-01-17 | End: 2025-01-21

## 2025-01-17 RX ORDER — ACETAMINOPHEN 500 MG
1000 TABLET ORAL ONCE
Status: COMPLETED | OUTPATIENT
Start: 2025-01-17 | End: 2025-01-17

## 2025-01-17 RX ORDER — DULOXETIN HYDROCHLORIDE 30 MG/1
30 CAPSULE, DELAYED RELEASE ORAL 2 TIMES DAILY
Status: DISCONTINUED | OUTPATIENT
Start: 2025-01-17 | End: 2025-01-23 | Stop reason: HOSPADM

## 2025-01-17 RX ORDER — POTASSIUM CHLORIDE 7.45 MG/ML
10 INJECTION INTRAVENOUS
Status: COMPLETED | OUTPATIENT
Start: 2025-01-17 | End: 2025-01-17

## 2025-01-17 RX ORDER — IPRATROPIUM BROMIDE AND ALBUTEROL SULFATE 2.5; .5 MG/3ML; MG/3ML
1 SOLUTION RESPIRATORY (INHALATION) EVERY 4 HOURS PRN
Status: DISCONTINUED | OUTPATIENT
Start: 2025-01-17 | End: 2025-01-23 | Stop reason: HOSPADM

## 2025-01-17 RX ORDER — METOPROLOL TARTRATE 50 MG
50 TABLET ORAL 2 TIMES DAILY
Status: DISCONTINUED | OUTPATIENT
Start: 2025-01-17 | End: 2025-01-17

## 2025-01-17 RX ORDER — GUAIFENESIN 600 MG/1
600 TABLET, EXTENDED RELEASE ORAL 2 TIMES DAILY
Status: DISCONTINUED | OUTPATIENT
Start: 2025-01-17 | End: 2025-01-23 | Stop reason: HOSPADM

## 2025-01-17 RX ORDER — ONDANSETRON 4 MG/1
4 TABLET, ORALLY DISINTEGRATING ORAL EVERY 8 HOURS PRN
Status: DISCONTINUED | OUTPATIENT
Start: 2025-01-17 | End: 2025-01-23 | Stop reason: HOSPADM

## 2025-01-17 RX ORDER — AZITHROMYCIN 250 MG/1
500 TABLET, FILM COATED ORAL DAILY
Status: COMPLETED | OUTPATIENT
Start: 2025-01-18 | End: 2025-01-21

## 2025-01-17 RX ADMIN — POTASSIUM CHLORIDE 10 MEQ: 7.46 INJECTION, SOLUTION INTRAVENOUS at 12:25

## 2025-01-17 RX ADMIN — PANTOPRAZOLE SODIUM 40 MG: 40 TABLET, DELAYED RELEASE ORAL at 08:44

## 2025-01-17 RX ADMIN — POTASSIUM CHLORIDE 40 MEQ: 1500 TABLET, EXTENDED RELEASE ORAL at 17:20

## 2025-01-17 RX ADMIN — AMIODARONE HYDROCHLORIDE 150 MG: 1.5 INJECTION, SOLUTION INTRAVENOUS at 01:02

## 2025-01-17 RX ADMIN — GUAIFENESIN 600 MG: 600 TABLET ORAL at 01:08

## 2025-01-17 RX ADMIN — ACETAMINOPHEN 1000 MG: 500 TABLET, FILM COATED ORAL at 00:17

## 2025-01-17 RX ADMIN — OSELTAMIVIR PHOSPHATE 75 MG: 75 CAPSULE ORAL at 22:02

## 2025-01-17 RX ADMIN — GUAIFENESIN 600 MG: 600 TABLET ORAL at 10:16

## 2025-01-17 RX ADMIN — AZITHROMYCIN 500 MG: 250 TABLET, FILM COATED ORAL at 10:13

## 2025-01-17 RX ADMIN — AMIODARONE HYDROCHLORIDE 1 MG/MIN: 50 INJECTION, SOLUTION INTRAVENOUS at 16:37

## 2025-01-17 RX ADMIN — ROSUVASTATIN CALCIUM 20 MG: 20 TABLET, FILM COATED ORAL at 22:02

## 2025-01-17 RX ADMIN — POTASSIUM BICARBONATE 40 MEQ: 782 TABLET, EFFERVESCENT ORAL at 01:08

## 2025-01-17 RX ADMIN — WATER 1000 MG: 1 INJECTION INTRAMUSCULAR; INTRAVENOUS; SUBCUTANEOUS at 01:05

## 2025-01-17 RX ADMIN — ROSUVASTATIN CALCIUM 20 MG: 20 TABLET, FILM COATED ORAL at 01:08

## 2025-01-17 RX ADMIN — MAGNESIUM SULFATE HEPTAHYDRATE 2000 MG: 40 INJECTION, SOLUTION INTRAVENOUS at 01:04

## 2025-01-17 RX ADMIN — OSELTAMIVIR PHOSPHATE 30 MG: 30 CAPSULE ORAL at 10:15

## 2025-01-17 RX ADMIN — AMIODARONE HYDROCHLORIDE 1 MG/MIN: 50 INJECTION, SOLUTION INTRAVENOUS at 08:44

## 2025-01-17 RX ADMIN — LOSARTAN POTASSIUM 100 MG: 100 TABLET, FILM COATED ORAL at 12:18

## 2025-01-17 RX ADMIN — AMIODARONE HYDROCHLORIDE 400 MG: 200 TABLET ORAL at 21:59

## 2025-01-17 RX ADMIN — ACETAMINOPHEN 650 MG: 325 TABLET ORAL at 16:25

## 2025-01-17 RX ADMIN — DULOXETINE HYDROCHLORIDE 30 MG: 30 CAPSULE, DELAYED RELEASE ORAL at 22:01

## 2025-01-17 RX ADMIN — POTASSIUM CHLORIDE 10 MEQ: 7.46 INJECTION, SOLUTION INTRAVENOUS at 10:20

## 2025-01-17 RX ADMIN — SODIUM CHLORIDE, PRESERVATIVE FREE 10 ML: 5 INJECTION INTRAVENOUS at 22:02

## 2025-01-17 RX ADMIN — GUAIFENESIN 600 MG: 600 TABLET ORAL at 22:02

## 2025-01-17 RX ADMIN — AMIODARONE HYDROCHLORIDE 150 MG: 1.5 INJECTION, SOLUTION INTRAVENOUS at 04:57

## 2025-01-17 RX ADMIN — POTASSIUM CHLORIDE 10 MEQ: 7.46 INJECTION, SOLUTION INTRAVENOUS at 09:13

## 2025-01-17 RX ADMIN — AMIODARONE HYDROCHLORIDE 400 MG: 200 TABLET ORAL at 10:14

## 2025-01-17 RX ADMIN — APIXABAN 5 MG: 5 TABLET, FILM COATED ORAL at 01:08

## 2025-01-17 RX ADMIN — AMIODARONE HYDROCHLORIDE 1 MG/MIN: 50 INJECTION, SOLUTION INTRAVENOUS at 01:16

## 2025-01-17 RX ADMIN — POTASSIUM CHLORIDE 40 MEQ: 1500 TABLET, EXTENDED RELEASE ORAL at 10:17

## 2025-01-17 RX ADMIN — APIXABAN 5 MG: 5 TABLET, FILM COATED ORAL at 12:18

## 2025-01-17 ASSESSMENT — PAIN SCALES - GENERAL: PAINLEVEL_OUTOF10: 0

## 2025-01-17 NOTE — CONSULTS
[facility]        Consult    NAME: Aide Bansal   :  1960   MRN:  [id number]     Date/Time:  2025 9:04 AM    Patient PCP: Eugenie Stanford APRN - NP  ________________________________________________________________________     Assessment:     AFib with RVR  MOSLEY  HTN  HFpEF  Dyslipidemia  GLENYS  Acute Hypoxemic Respiratory Failure  GERD  Morbid Obesity  Flu A positive  Hypokalemia  Sepsis  Neutropenia  Thrombocytopenia  Full Code   and raising 12 yo grandchild  -t/-+/-s      Plan:   Received Amiodarone bolus and drip. Will add Amio 400 bid po x 5 days and then daily. Restart home Metoprolol at half dose. Was in NSR 25. On Bipap. Hopefully will convert with pharmacologic measures. If not will plan on DCCV when more stable. Afib ablation should be considered. Check TSH and Mag and update TTE. Continue Eliquis.     Dr. Cleary will see over the weekend.      [x]       High complexity decision making was performed in this patient at high risk for decompensation with multiple organ involvement.      Subjective:   CHIEF COMPLAINT: Afib with RVR    HISTORY OF PRESENT ILLNESS:     Aide is a 64 y.o.   female who presented to the [facility] Ed yesterday with worsening shortness of breath. She was diagnosed with Flu A on 2025 in the ED. She was in NSR at that time. She went home and had had poor intake and worsening symptoms. She returned with progressive and worsening shortness of breath, mosley and generalized fatigue and weakness with poor appetite. She denies chest pain, palpitations and has chronic leg edema.       We were asked to consult for work up and evaluation of the above problems.     Past Medical History:   Diagnosis Date    Arrhythmia 2021    Cancer (HCC) 1986    cervical    Dyslipidemia 2021    Fibromyalgia     GERD (gastroesophageal reflux disease)     HTN (hypertension) 2021      Past Surgical History:   Procedure Laterality Date

## 2025-01-17 NOTE — PROGRESS NOTES
Attempted to see pt for OT services and pt has now transitioned onto BiPap due to pts respiratory condition.  Will defer OT at this time, but continue to follow.

## 2025-01-17 NOTE — PROGRESS NOTES
Hospitalist non billable progress note    Admitted for Acute Hypoxemic resp failure +Afib in RVR   Influenza A     Noted  tachypnea in mid 30s. Diffuse wheezing and coarse breath sounds on examination. CXR noted for interstitial edema- ?fluid overload vs viral pneumonia .  Elevated BNP Could not diurese her due to low potassium. Placed on brief BiPAP for fluid overload.   Supplemented potassium . If repeat potassium normal , plan to start diuresis.     Noted for diarrhea - has been ongoing for few days. C diff and Enteric panel ordered. If negative , will order Imodium.     For A fib evaluated by cardio - Amiodarone infusion +oral . Resumed Metoprolol 12.5mg bid . Echo , TSH ordered     Rest of the management as per admitting team.

## 2025-01-17 NOTE — ED PROVIDER NOTES
recent diagnosis of influenza A.  Chest x-ray shows likely viral pneumonia.  Has been persistently in A-fib with RVR.  Will admit to hospitalist for further management. Patient acknowledges understanding and is in agreement with plan for admission at this time.    CRITICAL CARE TIME       IMPENDING DETERIORATION -Airway, Respiratory, Cardiovascular, CNS, Metabolic, Renal, and Hepatic  ASSOCIATED RISK FACTORS - Hypotension, Shock, Hypoxia, Dysrhythmia, Metabolic changes, Dehydration, Vascular Compromise, and CNS Decompensation  MANAGEMENT- Bedside Assessment and Supervision of Care  INTERPRETATION -  Xrays, CT Scan, Blood Gases, ECG, Blood Pressure, and Cardiac Output Measures   INTERVENTIONS - hemodynamic mngmt, vascular control, Neurologic interventions , and Metobolic interventions  CASE REVIEW - Hospitalist/Intensivist, Nursing, and Family  TREATMENT RESPONSE -Improved  PERFORMED BY - Self  NOTES   :  I have spent 93 minutes of critical care time involved in lab review, consultations with specialist, family decision- making, bedside attention and documentation excluding time spent on any separately billed procedures. During this entire length of time I was immediately available to the patient .   Giovanna Barajas MD    SOCIAL DETERMINATES OF HEALTH AFFECTING DX OR TX     Lack of Support/Lives Alone    COMPLEXITY     Amount and/or Complexity of Data Reviewed  HIGH complexity decision making performed   Presentation: ACUTE and SEVERE (giving consideration to thing such as systemic symptoms, impact on quality of life, morbidity and mortality).  Clinical lab tests: ordered as appropriate, reviewed and interpreted by me   Radiology studies: (if indicated) ordered as appropriate & images reviewed and interpreted by me  Reviewed and summarized all available results: yes  Review and summarize available past medical records: yes  Independent visualization of images, ECGs, or specimens (if performed): yes  Independent

## 2025-01-17 NOTE — ED NOTES
Handoff report received from RICHARD Larson. Introduced self to pt and gave opportunity to ask questions. Pt alert, conversational, and in no acute distress. Opportunity was given to ask questions. Side rails x 2, call light within reach, bed locked and in lowest position.

## 2025-01-17 NOTE — ED NOTES
Bedside shift change report given to RICHARD Magdaleno (oncoming nurse) by RICHARD Saravia (offgoing nurse). Report included the following information Nurse Handoff Report, Index, ED Encounter Summary, ED SBAR, Adult Overview, Surgery Report, Intake/Output, MAR, Recent Results, and Med Rec Status.

## 2025-01-17 NOTE — ED NOTES
Assumed care of patient at this time. Patient arrives into ED Rm 17 via EMS from home with cc of shortness of breath. Patient was recently discharged from hospital with Flu A. Patient initial O2 sats were in the 80s on room air per EMS. Patient placed on monitor x3 with side rails x2 and Karl ZULUAGA at bedside att.

## 2025-01-17 NOTE — H&P
6/16/2021    CONTRAST: None.    TECHNIQUE:  5 mm axial images were obtained through the chest. Coronal and  sagittal reformats were generated.  CT dose reduction was achieved through use  of a standardized protocol tailored for this examination and automatic exposure  control for dose modulation.    The absence of intravenous contrast reduces the sensitivity for evaluation of  the mediastinum, lewis, vasculature, and upper abdominal organs.    FINDINGS:    CHEST WALL: No mass or axillary lymphadenopathy.  THYROID: No nodule.  MEDIASTINUM: No mass or lymphadenopathy.  LEWIS: No mass or lymphadenopathy.  THORACIC AORTA: No aneurysm.  MAIN PULMONARY ARTERY: Normal in caliber.  TRACHEA/BRONCHI: Patent.  ESOPHAGUS: No wall thickening or dilatation.  HEART: Normal in size. Coronary artery calcium: present  PLEURA: No effusion or pneumothorax.  LUNGS: No nodule, mass, or airspace disease. Minimal atelectatic changes in the  dependent portions of bilateral lower lobes.  INCIDENTALLY IMAGED UPPER ABDOMEN: Cholelithiasis.  BONES: No destructive bone lesion.    Impression  1.  No acute intrathoracic findings. Minimal bibasilar atelectasis.  2.  Cholelithiasis.        Xray Result (most recent):  XR CHEST PORTABLE 01/16/2025    Narrative  EXAM: Portable CXR.    INDICATION: CP/sob    FINDINGS:  The lungs show no consolidation. Heart is normal in size. There is interstitial  pulmonary edema. There is no evident pneumothorax or pleural effusion.    Impression  Interstitial pulmonary edema.      Electronically signed by AKANKSHA ATKINS        _______________________________________________________________________    TOTAL TIME:  75 Minutes   TOBACCO CESSATION COUNSELING: No    Critical Care Provided: Yes  (Minutes non procedure based)    Justification for critical care billing:  Amiodarone infusion with unstable RVR  Acute hypoxemic respiratory failure with high oxygen requirement  Severe Sepsis    I have spent  60 minutes of critical

## 2025-01-17 NOTE — PROGRESS NOTES
Physical Therapy    Received eval orders. Pt with resp failure now needing Bipap. Too acute for eval at this time. Will follow.    Julia Kelley PT

## 2025-01-17 NOTE — PROGRESS NOTES
Tamiflu Dosing    Adjusted Tamiflu to 75mg BID for improved eCrCl    Estimated Creatinine Clearance: 67 mL/min (A) (based on SCr of 1.22 mg/dL (H)).      Oseltamivir Dose Adjustments in Altered Kidney Functiona  CrCl If the usual indication-specific dose is 75 mg once daily (eg, seasonal influenza prophylaxis) If the usual indication-specific dose is 75 mg twice daily (eg, seasonal influenza treatment)   >=60 mL/minute No dosage adjustment necessary No dosage adjustment necessary   >30 to <60 mL/minute 30 mg once daily 75 mg × 1 dose,b then 30 mg twice daily   >10 to 30 mL/minute 30 mg every other day 30 mg once daily   <=10 mL/minute 30 mg once weeklyc 30 mg every other dayc   a References (unless otherwise specified): Piper 2015a; ’s labeling.   b Bhupinder 2021.   c Expert opinion only. According to ’s labeling, oseltamivir is not recommended in patients with end-stage kidney disease not on dialysis.       Thank you,  ALANA DOUGLAS [facility]

## 2025-01-18 LAB
ANION GAP SERPL CALC-SCNC: 6 MMOL/L (ref 2–12)
BASOPHILS # BLD: 0 K/UL (ref 0–0.1)
BASOPHILS NFR BLD: 0 % (ref 0–1)
BUN SERPL-MCNC: 11 MG/DL (ref 6–20)
BUN/CREAT SERPL: 11 (ref 12–20)
CALCIUM SERPL-MCNC: 8.8 MG/DL (ref 8.5–10.1)
CHLORIDE SERPL-SCNC: 101 MMOL/L (ref 97–108)
CO2 SERPL-SCNC: 27 MMOL/L (ref 21–32)
CREAT SERPL-MCNC: 1 MG/DL (ref 0.55–1.02)
DIFFERENTIAL METHOD BLD: ABNORMAL
EOSINOPHIL # BLD: 0 K/UL (ref 0–0.4)
EOSINOPHIL NFR BLD: 0 % (ref 0–7)
ERYTHROCYTE [DISTWIDTH] IN BLOOD BY AUTOMATED COUNT: 17.4 % (ref 11.5–14.5)
GLUCOSE SERPL-MCNC: 105 MG/DL (ref 65–100)
HCT VFR BLD AUTO: 35.5 % (ref 35–47)
HGB BLD-MCNC: 12 G/DL (ref 11.5–16)
IMM GRANULOCYTES # BLD AUTO: 0 K/UL (ref 0–0.04)
IMM GRANULOCYTES NFR BLD AUTO: 0 % (ref 0–0.5)
LYMPHOCYTES # BLD: 0.63 K/UL (ref 0.8–3.5)
LYMPHOCYTES NFR BLD: 30 % (ref 12–49)
MAGNESIUM SERPL-MCNC: 2.2 MG/DL (ref 1.6–2.4)
MCH RBC QN AUTO: 28.1 PG (ref 26–34)
MCHC RBC AUTO-ENTMCNC: 33.8 G/DL (ref 30–36.5)
MCV RBC AUTO: 83.1 FL (ref 80–99)
MONOCYTES # BLD: 0.23 K/UL (ref 0–1)
MONOCYTES NFR BLD: 11 % (ref 5–13)
NEUTS BAND NFR BLD MANUAL: 2 %
NEUTS SEG # BLD: 1.24 K/UL (ref 1.8–8)
NEUTS SEG NFR BLD: 57 % (ref 32–75)
NRBC # BLD: 0 K/UL (ref 0–0.01)
NRBC BLD-RTO: 0 PER 100 WBC
PLATELET # BLD AUTO: 96 K/UL (ref 150–400)
PMV BLD AUTO: 11.2 FL (ref 8.9–12.9)
POTASSIUM SERPL-SCNC: 3.2 MMOL/L (ref 3.5–5.1)
RBC # BLD AUTO: 4.27 M/UL (ref 3.8–5.2)
RBC MORPH BLD: ABNORMAL
SODIUM SERPL-SCNC: 134 MMOL/L (ref 136–145)
WBC # BLD AUTO: 2.1 K/UL (ref 3.6–11)

## 2025-01-18 PROCEDURE — 85025 COMPLETE CBC W/AUTO DIFF WBC: CPT

## 2025-01-18 PROCEDURE — 97535 SELF CARE MNGMENT TRAINING: CPT | Performed by: OCCUPATIONAL THERAPIST

## 2025-01-18 PROCEDURE — 2060000000 HC ICU INTERMEDIATE R&B

## 2025-01-18 PROCEDURE — 6370000000 HC RX 637 (ALT 250 FOR IP): Performed by: NURSE PRACTITIONER

## 2025-01-18 PROCEDURE — 2580000003 HC RX 258: Performed by: STUDENT IN AN ORGANIZED HEALTH CARE EDUCATION/TRAINING PROGRAM

## 2025-01-18 PROCEDURE — 97530 THERAPEUTIC ACTIVITIES: CPT | Performed by: OCCUPATIONAL THERAPIST

## 2025-01-18 PROCEDURE — 6360000002 HC RX W HCPCS: Performed by: STUDENT IN AN ORGANIZED HEALTH CARE EDUCATION/TRAINING PROGRAM

## 2025-01-18 PROCEDURE — 6370000000 HC RX 637 (ALT 250 FOR IP): Performed by: STUDENT IN AN ORGANIZED HEALTH CARE EDUCATION/TRAINING PROGRAM

## 2025-01-18 PROCEDURE — 36415 COLL VENOUS BLD VENIPUNCTURE: CPT

## 2025-01-18 PROCEDURE — 83735 ASSAY OF MAGNESIUM: CPT

## 2025-01-18 PROCEDURE — 97166 OT EVAL MOD COMPLEX 45 MIN: CPT | Performed by: OCCUPATIONAL THERAPIST

## 2025-01-18 PROCEDURE — 80048 BASIC METABOLIC PNL TOTAL CA: CPT

## 2025-01-18 PROCEDURE — 2700000000 HC OXYGEN THERAPY PER DAY

## 2025-01-18 PROCEDURE — 97530 THERAPEUTIC ACTIVITIES: CPT

## 2025-01-18 PROCEDURE — 2500000003 HC RX 250 WO HCPCS: Performed by: STUDENT IN AN ORGANIZED HEALTH CARE EDUCATION/TRAINING PROGRAM

## 2025-01-18 PROCEDURE — 97162 PT EVAL MOD COMPLEX 30 MIN: CPT

## 2025-01-18 RX ORDER — BENZONATATE 100 MG/1
100 CAPSULE ORAL 3 TIMES DAILY PRN
Status: DISCONTINUED | OUTPATIENT
Start: 2025-01-18 | End: 2025-01-23 | Stop reason: HOSPADM

## 2025-01-18 RX ORDER — FUROSEMIDE 10 MG/ML
40 INJECTION INTRAMUSCULAR; INTRAVENOUS ONCE
Status: DISCONTINUED | OUTPATIENT
Start: 2025-01-18 | End: 2025-01-18

## 2025-01-18 RX ORDER — POTASSIUM CHLORIDE 1500 MG/1
40 TABLET, EXTENDED RELEASE ORAL 2 TIMES DAILY WITH MEALS
Status: COMPLETED | OUTPATIENT
Start: 2025-01-18 | End: 2025-01-18

## 2025-01-18 RX ADMIN — DULOXETINE HYDROCHLORIDE 30 MG: 30 CAPSULE, DELAYED RELEASE ORAL at 09:11

## 2025-01-18 RX ADMIN — APIXABAN 5 MG: 5 TABLET, FILM COATED ORAL at 00:00

## 2025-01-18 RX ADMIN — METOPROLOL TARTRATE 12.5 MG: 25 TABLET, FILM COATED ORAL at 21:24

## 2025-01-18 RX ADMIN — METOPROLOL TARTRATE 12.5 MG: 25 TABLET, FILM COATED ORAL at 09:11

## 2025-01-18 RX ADMIN — AMIODARONE HYDROCHLORIDE 1 MG/MIN: 50 INJECTION, SOLUTION INTRAVENOUS at 00:54

## 2025-01-18 RX ADMIN — POTASSIUM CHLORIDE 40 MEQ: 1500 TABLET, EXTENDED RELEASE ORAL at 09:11

## 2025-01-18 RX ADMIN — AMIODARONE HYDROCHLORIDE 1 MG/MIN: 50 INJECTION, SOLUTION INTRAVENOUS at 10:48

## 2025-01-18 RX ADMIN — POTASSIUM CHLORIDE 40 MEQ: 1500 TABLET, EXTENDED RELEASE ORAL at 17:54

## 2025-01-18 RX ADMIN — DULOXETINE HYDROCHLORIDE 30 MG: 30 CAPSULE, DELAYED RELEASE ORAL at 21:24

## 2025-01-18 RX ADMIN — OSELTAMIVIR PHOSPHATE 75 MG: 75 CAPSULE ORAL at 21:24

## 2025-01-18 RX ADMIN — GUAIFENESIN 600 MG: 600 TABLET ORAL at 21:24

## 2025-01-18 RX ADMIN — AMIODARONE HYDROCHLORIDE 400 MG: 200 TABLET ORAL at 09:11

## 2025-01-18 RX ADMIN — SODIUM CHLORIDE, PRESERVATIVE FREE 10 ML: 5 INJECTION INTRAVENOUS at 21:30

## 2025-01-18 RX ADMIN — ROSUVASTATIN CALCIUM 20 MG: 20 TABLET, FILM COATED ORAL at 21:24

## 2025-01-18 RX ADMIN — SODIUM CHLORIDE, PRESERVATIVE FREE 10 ML: 5 INJECTION INTRAVENOUS at 09:12

## 2025-01-18 RX ADMIN — PANTOPRAZOLE SODIUM 40 MG: 40 TABLET, DELAYED RELEASE ORAL at 06:25

## 2025-01-18 RX ADMIN — WATER 1000 MG: 1 INJECTION INTRAMUSCULAR; INTRAVENOUS; SUBCUTANEOUS at 00:00

## 2025-01-18 RX ADMIN — AZITHROMYCIN 500 MG: 250 TABLET, FILM COATED ORAL at 09:11

## 2025-01-18 RX ADMIN — APIXABAN 5 MG: 5 TABLET, FILM COATED ORAL at 12:48

## 2025-01-18 RX ADMIN — METOPROLOL TARTRATE 12.5 MG: 25 TABLET, FILM COATED ORAL at 00:00

## 2025-01-18 RX ADMIN — BENZONATATE 100 MG: 100 CAPSULE ORAL at 22:11

## 2025-01-18 RX ADMIN — AMIODARONE HYDROCHLORIDE 1 MG/MIN: 50 INJECTION, SOLUTION INTRAVENOUS at 21:23

## 2025-01-18 RX ADMIN — LOSARTAN POTASSIUM 100 MG: 100 TABLET, FILM COATED ORAL at 09:11

## 2025-01-18 RX ADMIN — GUAIFENESIN 600 MG: 600 TABLET ORAL at 09:11

## 2025-01-18 RX ADMIN — OSELTAMIVIR PHOSPHATE 75 MG: 75 CAPSULE ORAL at 09:11

## 2025-01-18 ASSESSMENT — PAIN SCALES - GENERAL: PAINLEVEL_OUTOF10: 0

## 2025-01-18 NOTE — PROGRESS NOTES
01/18/25 1026 01/18/25 1029 01/18/25 1031   Vital Signs   Pulse 100 (!) 120 (!) 107   /69 (!) 84/51 (!) 67/54   MAP (Calculated) 79 62 58   BP Location Left upper arm Left upper arm Left upper arm   BP Method  --  Automatic Automatic   Patient Position Supine Sitting Sitting  (after seated LE exercise)   Oxygen Therapy   SpO2 94 % 93 % 96 %   O2 Device Nasal cannula Nasal cannula Nasal cannula   O2 Flow Rate (L/min) 5 L/min 5 L/min 5 L/min      01/18/25 1035   Vital Signs   Pulse (!) 113   BP 93/61   MAP (Calculated) 72   BP Location Left upper arm   BP Method Automatic   Patient Position Supine   Oxygen Therapy   SpO2 96 %   O2 Device Nasal cannula   O2 Flow Rate (L/min) 5 L/min

## 2025-01-18 NOTE — PROGRESS NOTES
[facility]        Progress Note    NAME: Aide Bansal   :  1960   MRN:  [id number]     Date/Time:  2025 12:50 PM    Patient PCP: Eugenie Stanford APRN - NP  ________________________________________________________________________     Assessment:     AFib with RVR  MOSLEY  HTN  HFpEF  Dyslipidemia  GLENYS  Acute Hypoxemic Respiratory Failure  GERD  Morbid Obesity  Flu A positive  Hypokalemia  Sepsis  Neutropenia  Thrombocytopenia    Full Code   and raising 14 yo grandchild  -t/-+/-s      Plan:   Received Amiodarone bolus and drip. Will add Amio 400 bid po x 5 days and then daily. Restart home Metoprolol at half dose. Was in NSR 25. On Bipap. Hopefully will convert with pharmacologic measures. If not will plan on DCCV when more stable. Afib ablation should be considered. Check TSH and Mag and update TTE. Continue Eliquis.     Dr. Cleary will see over the weekend.      update:  TSH and free T4 ordered, don't see it in results.  Mag OK.  Echo pending.  She remains in atrial fibrillation and on amiodarone infusion.  I'll hold oral while on IV.  QT acceptable on concomitant azithromycin.     [x]       High complexity decision making was performed in this patient at high risk for decompensation with multiple organ involvement.      Subjective:   CHIEF COMPLAINT: Afib with RVR    HISTORY OF PRESENT ILLNESS:     Aide is a 64 y.o.   female who presented to the [facility] Ed yesterday with worsening shortness of breath. She was diagnosed with Flu A on 2025 in the ED. She was in NSR at that time. She went home and had had poor intake and worsening symptoms. She returned with progressive and worsening shortness of breath, mosley and generalized fatigue and weakness with poor appetite. She denies chest pain, palpitations and has chronic leg edema.  We were asked to consult for work up and evaluation of the above problems.    TODAY:  +malaise, nonproductive cough.  No

## 2025-01-18 NOTE — PROGRESS NOTES
End of Shift Note    Bedside shift change report given to Marsha BLANCA (oncoming nurse) by TAWNY ESCOBEDO RN (offgoing nurse).  Report included the following information SBAR, MAR, and Cardiac Rhythm afib    Shift worked:  7p-7a     Shift summary and any significant changes:     Amiodarone  drip cont    Purwick remove due to excoriation/rash in groin area. Zinc applied      0630 pt insisted on having purwick .. Purwick in place     Concerns for physician to address:  K+ 3.2     Zone phone for oncoming shift:          Activity:  Level of Assistance: Minimal assist, patient does 75% or more  Number times ambulated in hallways past shift: 0  Number of times OOB to chair past shift: 0    Cardiac:   Cardiac Monitoring: Yes      Cardiac Rhythm: Atrial fib    Access:  Current line(s): PIV     Genitourinary:        Respiratory:   O2 Device: Nasal cannula  Chronic home O2 use?: NO  Incentive spirometer at bedside: NO    GI:  Last BM (including prior to admit): 01/17/25  Current diet:  ADULT DIET; Regular; 4 carb choices (60 gm/meal); Low Fat/Low Chol/High Fiber/CALISTA  Passing flatus: YES    Pain Management:   Patient states pain is manageable on current regimen: YES    Skin:  Hilario Scale Score: 19  Interventions: Wound Offloading (Prevention Methods): Repositioning    Patient Safety:  Fall Risk: Nursing Judgement-Fall Risk High(Add Comments): Yes  Fall Risk Interventions  Nursing Judgement-Fall Risk High(Add Comments): Yes  Toilet Every 2 Hours-In Advance of Need: Yes  Hourly Visual Checks: Eyes closed, In bed  Fall Visual Posted: Armband, Socks  Room Door Open: Deferred for droplet isolation  Alarm On: Bed, Chair  Patient Moved Closer to Nursing Station: Yes    Active Consults:   IP CONSULT TO CARDIOLOGY  IP CONSULT TO SPIRITUAL SERVICES    Length of Stay:  Expected LOS: 3  Actual LOS: 1    TAWNY ESCOBEDO, RN

## 2025-01-18 NOTE — PROGRESS NOTES
Hospitalist Progress Note    NAME:   Aide Bansal   : 1960   MRN: [id number]     Date/Time: 2025 2:05 PM  Patient PCP: Eugenie Stanford APRN - NP    Estimated discharge date:  Barriers:       Assessment / Plan:  Acute hypoxic respiratory failure POA requiring oxygen supplementation  Severe sepsis-tachycardia plus lactic acidosis  Influenza A  Atrial fibrillation in RVR  Orthostatic hypotension     -On :Noted tachypnea in mid 30s. Diffuse wheezing and coarse breath sounds on examination. CXR noted for interstitial edema- ?fluid overload vs viral pneumonia . Elevated BNP Could not diurese her due to low potassium. Placed on brief BiPAP for fluid overload.  -Lactic acidosis resolved  -Blood culture no growth so far    -Continue with oxygen supplementation to maintain saturation greater than 90%  -Continue with Tamiflu  -Continue with ceftriaxone and azithromycin  -Telemetry noted for A-fib  -As per cardiology: Echo pending.  Continue amiodarone infusion.  Hold oral amiodarone while on IV.  Monitor for Qtc. Continue with Eliquis .  -Encourage oral hydration , avoid IVF due to elevated BNP   -Hold losartan , HCTZ   - Continue with reduced dose of Metoprolol for now         Hypokalemia  -Potassium 3.2  Supplemented potassium    Thrombocytopenia  Platelet count-96  Continue to trend    Diarrhea  RN informed no definitive BM for collection for sample.   C diff and Enteric panel ordered    HLD-Continue with statin   GERD  Formulary substitution Protonix     MDD, SARAH  Continue PTA duloxetine     Insomnia  Continue PTA trazodone     Obesity class III by BMI 48.42  Recommend medically assisted weight loss in outpatient setting    Medical Decision Making:   I personally reviewed labs:Yes  I personally reviewed imaging:Yes  I personally reviewed EKG:yes  Toxic drug monitoring: Amiodarone and Azithromycin - qtc   Discussed case with: Patient, RN         Code Status: Full   DVT Prophylaxis:   GI

## 2025-01-18 NOTE — PROGRESS NOTES
Spiritual Health History and Assessment/Progress Note  [facility]    Spiritual/Emotional Needs,  , Life Adjustments, Adjustment to illness,      Name: Aide Bansal MRN: [id number]    Age: 64 y.o.     Sex: female   Language: English   Spiritism: Lutheran   Influenza A     Date: 1/18/2025            Total Time Calculated: 24 min              Spiritual Assessment began in [facility] CARDIOPULMONARY C[facility]        Referral/Consult From: Multi-disciplinary team   Encounter Overview/Reason: Spiritual/Emotional Needs  Service Provided For: Patient    Gracia, Belief, Meaning:   Patient is connected with a gracia tradition or spiritual practice and has beliefs or practices that help with coping during difficult times  Family/Friends No family/friends present      Importance and Influence:  Patient has spiritual/personal beliefs that influence decisions regarding their health  Family/Friends No family/friends present    Community:  Patient feels well-supported. Support system includes: Children, Gracia Community, and Extended family  Family/Friends No family/friends present    Assessment and Plan of Care:     Patient Interventions include: Facilitated expression of thoughts and feelings, Explored spiritual coping/struggle/distress, Affirmed coping skills/support systems, and Facilitated life review and/ or legacy  Family/Friends Interventions include: No family/friends present    Patient Plan of Care: Spiritual Care available upon further referral  Family/Friends Plan of Care: No family/friends present    Electronically signed by JULIUS Chung on 1/18/2025 at 2:54 PM

## 2025-01-19 LAB
ANION GAP SERPL CALC-SCNC: 5 MMOL/L (ref 2–12)
BASOPHILS # BLD: 0 K/UL (ref 0–0.1)
BASOPHILS NFR BLD: 0 % (ref 0–1)
BUN SERPL-MCNC: 9 MG/DL (ref 6–20)
BUN/CREAT SERPL: 11 (ref 12–20)
CALCIUM SERPL-MCNC: 8.9 MG/DL (ref 8.5–10.1)
CHLORIDE SERPL-SCNC: 105 MMOL/L (ref 97–108)
CO2 SERPL-SCNC: 27 MMOL/L (ref 21–32)
CREAT SERPL-MCNC: 0.85 MG/DL (ref 0.55–1.02)
DIFFERENTIAL METHOD BLD: ABNORMAL
EOSINOPHIL # BLD: 0 K/UL (ref 0–0.4)
EOSINOPHIL NFR BLD: 0 % (ref 0–7)
ERYTHROCYTE [DISTWIDTH] IN BLOOD BY AUTOMATED COUNT: 17.2 % (ref 11.5–14.5)
GLUCOSE SERPL-MCNC: 102 MG/DL (ref 65–100)
HCT VFR BLD AUTO: 35.2 % (ref 35–47)
HGB BLD-MCNC: 11.4 G/DL (ref 11.5–16)
IMM GRANULOCYTES # BLD AUTO: 0 K/UL (ref 0–0.04)
IMM GRANULOCYTES NFR BLD AUTO: 0 % (ref 0–0.5)
LYMPHOCYTES # BLD: 0.73 K/UL (ref 0.8–3.5)
LYMPHOCYTES NFR BLD: 33 % (ref 12–49)
MAGNESIUM SERPL-MCNC: 2 MG/DL (ref 1.6–2.4)
MCH RBC QN AUTO: 27.4 PG (ref 26–34)
MCHC RBC AUTO-ENTMCNC: 32.4 G/DL (ref 30–36.5)
MCV RBC AUTO: 84.6 FL (ref 80–99)
MONOCYTES # BLD: 0.13 K/UL (ref 0–1)
MONOCYTES NFR BLD: 6 % (ref 5–13)
NEUTS BAND NFR BLD MANUAL: 3 %
NEUTS SEG # BLD: 1.34 K/UL (ref 1.8–8)
NEUTS SEG NFR BLD: 58 % (ref 32–75)
NRBC # BLD: 0 K/UL (ref 0–0.01)
NRBC BLD-RTO: 0 PER 100 WBC
PLATELET # BLD AUTO: 97 K/UL (ref 150–400)
PMV BLD AUTO: 10.7 FL (ref 8.9–12.9)
POTASSIUM SERPL-SCNC: 3.6 MMOL/L (ref 3.5–5.1)
RBC # BLD AUTO: 4.16 M/UL (ref 3.8–5.2)
RBC MORPH BLD: ABNORMAL
SODIUM SERPL-SCNC: 137 MMOL/L (ref 136–145)
WBC # BLD AUTO: 2.2 K/UL (ref 3.6–11)
WBC MORPH BLD: ABNORMAL

## 2025-01-19 PROCEDURE — 2500000003 HC RX 250 WO HCPCS: Performed by: STUDENT IN AN ORGANIZED HEALTH CARE EDUCATION/TRAINING PROGRAM

## 2025-01-19 PROCEDURE — 6370000000 HC RX 637 (ALT 250 FOR IP): Performed by: STUDENT IN AN ORGANIZED HEALTH CARE EDUCATION/TRAINING PROGRAM

## 2025-01-19 PROCEDURE — 2580000003 HC RX 258: Performed by: STUDENT IN AN ORGANIZED HEALTH CARE EDUCATION/TRAINING PROGRAM

## 2025-01-19 PROCEDURE — 80048 BASIC METABOLIC PNL TOTAL CA: CPT

## 2025-01-19 PROCEDURE — 2060000000 HC ICU INTERMEDIATE R&B

## 2025-01-19 PROCEDURE — 83735 ASSAY OF MAGNESIUM: CPT

## 2025-01-19 PROCEDURE — 85025 COMPLETE CBC W/AUTO DIFF WBC: CPT

## 2025-01-19 PROCEDURE — 36415 COLL VENOUS BLD VENIPUNCTURE: CPT

## 2025-01-19 PROCEDURE — 6360000002 HC RX W HCPCS: Performed by: STUDENT IN AN ORGANIZED HEALTH CARE EDUCATION/TRAINING PROGRAM

## 2025-01-19 RX ADMIN — ROSUVASTATIN CALCIUM 20 MG: 20 TABLET, FILM COATED ORAL at 20:16

## 2025-01-19 RX ADMIN — OSELTAMIVIR PHOSPHATE 75 MG: 75 CAPSULE ORAL at 20:16

## 2025-01-19 RX ADMIN — METOPROLOL TARTRATE 12.5 MG: 25 TABLET, FILM COATED ORAL at 20:16

## 2025-01-19 RX ADMIN — DULOXETINE HYDROCHLORIDE 30 MG: 30 CAPSULE, DELAYED RELEASE ORAL at 09:36

## 2025-01-19 RX ADMIN — AMIODARONE HYDROCHLORIDE 1 MG/MIN: 50 INJECTION, SOLUTION INTRAVENOUS at 05:46

## 2025-01-19 RX ADMIN — DULOXETINE HYDROCHLORIDE 30 MG: 30 CAPSULE, DELAYED RELEASE ORAL at 20:16

## 2025-01-19 RX ADMIN — PANTOPRAZOLE SODIUM 40 MG: 40 TABLET, DELAYED RELEASE ORAL at 05:47

## 2025-01-19 RX ADMIN — SODIUM CHLORIDE, PRESERVATIVE FREE 10 ML: 5 INJECTION INTRAVENOUS at 09:36

## 2025-01-19 RX ADMIN — OSELTAMIVIR PHOSPHATE 75 MG: 75 CAPSULE ORAL at 09:36

## 2025-01-19 RX ADMIN — AMIODARONE HYDROCHLORIDE 1 MG/MIN: 50 INJECTION, SOLUTION INTRAVENOUS at 14:30

## 2025-01-19 RX ADMIN — GUAIFENESIN 600 MG: 600 TABLET ORAL at 20:16

## 2025-01-19 RX ADMIN — AZITHROMYCIN 500 MG: 250 TABLET, FILM COATED ORAL at 09:36

## 2025-01-19 RX ADMIN — WATER 1000 MG: 1 INJECTION INTRAMUSCULAR; INTRAVENOUS; SUBCUTANEOUS at 00:29

## 2025-01-19 RX ADMIN — APIXABAN 5 MG: 5 TABLET, FILM COATED ORAL at 12:34

## 2025-01-19 RX ADMIN — GUAIFENESIN 600 MG: 600 TABLET ORAL at 09:36

## 2025-01-19 RX ADMIN — POTASSIUM BICARBONATE 20 MEQ: 782 TABLET, EFFERVESCENT ORAL at 09:36

## 2025-01-19 RX ADMIN — METOPROLOL TARTRATE 12.5 MG: 25 TABLET, FILM COATED ORAL at 09:36

## 2025-01-19 RX ADMIN — SODIUM CHLORIDE, PRESERVATIVE FREE 10 ML: 5 INJECTION INTRAVENOUS at 20:19

## 2025-01-19 RX ADMIN — ACETAMINOPHEN 650 MG: 325 TABLET ORAL at 18:49

## 2025-01-19 RX ADMIN — APIXABAN 5 MG: 5 TABLET, FILM COATED ORAL at 00:29

## 2025-01-19 ASSESSMENT — PAIN SCALES - GENERAL
PAINLEVEL_OUTOF10: 0
PAINLEVEL_OUTOF10: 0
PAINLEVEL_OUTOF10: 6
PAINLEVEL_OUTOF10: 0

## 2025-01-19 ASSESSMENT — PAIN DESCRIPTION - LOCATION: LOCATION: HEAD

## 2025-01-19 NOTE — PROGRESS NOTES
Hospitalist Progress Note    NAME:   Aide Bansal   : 1960   MRN: [id number]     Date/Time: 2025 2:04 PM  Patient PCP: Eugenie Stanford APRN - NP    Estimated discharge date:  Barriers: echo, weaning off oxygen, cardio clearance,       Assessment / Plan:  Acute hypoxic respiratory failure POA requiring oxygen supplementation  Severe sepsis-tachycardia plus lactic acidosis  Influenza A  Atrial fibrillation in RVR  Orthostatic hypotension     -On :Noted tachypnea in mid 30s. Diffuse wheezing and coarse breath sounds on examination. CXR noted for interstitial edema- ?fluid overload vs viral pneumonia . Elevated BNP Could not diurese her due to low potassium. Placed on brief BiPAP for fluid overload.  -Lactic acidosis resolved  -Blood culture no growth so far X 2 days  -TFT still pending, reordered     -Continue with oxygen supplementation to maintain saturation greater than 90%. At present on 5l NC, Wean off oxygen as tolerated. May need oxygen challenge prior to discharge  -Continue with Tamiflu  -Continue with ceftriaxone and azithromycin  -Telemetry noted for A-fib but rate controlled   -As per cardiology on  : Echo pending.  Continue amiodarone infusion.  Hold oral amiodarone while on IV.  Monitor for Qtc.   -Continue with Eliquis .  -Encourage oral hydration , avoid IVF due to elevated BNP   -Hold losartan , HCTZ   - Continue with reduced dose of Metoprolol for now  -Continue with PT/OT   -Continue with incentive spirometry          Hypokalemia- resolved   -Potassium 3.6   Supplemented potassium    Thrombocytopenia  Platelet count-97  Continue to trend    Diarrhea  RN informed no definitive BM for collection for sample.   C diff and Enteric panel order cancelled     HLD-Continue with statin   GERD  Formulary substitution Protonix     MDD, SARAH  Continue PTA duloxetine     Insomnia  Continue PTA trazodone     Obesity class III by BMI 48.42  Recommend medically assisted weight loss in

## 2025-01-19 NOTE — CARE COORDINATION
Transition of Care Plan:    RUR: 15%-\"Moderate Risk\"  Prior Level of Functioning: Independent in her ADLs and IADLs  Disposition: Home with Home Health Services-CM discussed with the patient that PT/OT are recommending HH services. She wants to think about whether or not she wants these services arranged  ELISABET: 1/22/25  If SNF or IPR: Date FOC offered: N/A  Follow up appointments: PCP & Specialists as indicated   DME needed: The patient owns a cane. She is currently on O2 but she does NOT have home O2 arranged  Transportation at discharge: The patient's  will transport her home on d/c   IM/IMM Medicare/ letter given: 2nd IM letter to be given prior to d/c   Is patient a  and connected with [facility]? N/A   If yes, was  transfer form completed and [facility] notified?   Caregiver Contact: Sabino Bansal, , Phone: [phone]-Please note that the patient's  works nights   Discharge Caregiver contacted prior to discharge? CM will contact her  if she requests this  Care Conference needed? No  Barriers to discharge: Pending medical stability     The patient  uses the [facility] on [address] in [place], [place]. She has never had HH services or been to a SNF/IPR facility in the past.      01/19/25 2705   Service Assessment   Patient Orientation Alert and Oriented   Cognition Alert   History Provided By Patient   Primary Caregiver Self   Support Systems Spouse/Significant Other;Children;Family Members  (The patient lives with her  and her 12 y/o granddaughter. She has 1 daughter that lives locally)   PCP Verified by CM Yes   Last Visit to PCP Within last 3 months   Prior Functional Level Independent in ADLs/IADLs   Current Functional Level Independent in ADLs/IADLs   Can patient return to prior living arrangement Yes   Family able to assist with home care needs: No   Would you like for me to discuss the discharge plan with any other family members/significant others, and if so,

## 2025-01-19 NOTE — PROGRESS NOTES
End of Shift Note    Bedside shift change report given to RN (oncoming nurse) by Marsha Holliday RN (offgoing nurse).  Report included the following information SBAR, Procedure Summary, Intake/Output, MAR, and Recent Results    Shift worked:  7a-7p     Shift summary and any significant changes:    Pt did not produce stool for stool sample. Up to chair x1. Slight bout of othostatic hypotension when working with PT/OT   Concerns for physician to address: None   Zone phone for oncoming shift:       Activity:  Level of Assistance: Minimal assist, patient does 75% or more  Number times ambulated in hallways past shift: 0  Number of times OOB to chair past shift: 1    Cardiac:   Cardiac Monitoring: Yes      Cardiac Rhythm: Atrial fib    Access:  Current line(s): PIV     Genitourinary:        Respiratory:   O2 Device: Nasal cannula  Chronic home O2 use?: YES  Incentive spirometer at bedside: NO    GI:  Last BM (including prior to admit): 01/17/25  Current diet:  ADULT DIET; Regular; 4 carb choices (60 gm/meal); Low Fat/Low Chol/High Fiber/CALISTA  Passing flatus: YES    Pain Management:   Patient states pain is manageable on current regimen: YES    Skin:  Hilario Scale Score: 19  Interventions: Wound Offloading (Prevention Methods): Repositioning    Patient Safety:  Fall Risk: Nursing Judgement-Fall Risk High(Add Comments): Yes  Fall Risk Interventions  Nursing Judgement-Fall Risk High(Add Comments): Yes  Toilet Every 2 Hours-In Advance of Need: Yes  Hourly Visual Checks: Eyes closed, In bed  Fall Visual Posted: Armband, Socks  Room Door Open: Deferred for droplet isolation  Alarm On: Bed, Chair  Patient Moved Closer to Nursing Station: No    Active Consults:   IP CONSULT TO CARDIOLOGY  IP CONSULT TO SPIRITUAL SERVICES    Length of Stay:  Expected LOS: 3  Actual LOS: 1    Marsha Holliday RN

## 2025-01-19 NOTE — PROGRESS NOTES
Nursing contacted Nocturnist/cross cover provider via non-urgent messaging system SeeWhy and notified patient admitted with respiratory failure/flu, complaining of a dry cough, asking for additional as needed cough medications, no increased work of breathing, no adventitious lung sounds, no increased O2 consumption reported. No other concerns reported. No acute distress reported. No other information provided by nurse. VSS.  Patient on empiric antibiotics already, Mucinex twice daily scheduled, and pulmonary toileting already ordered.    Ordered Tessalon Perles 3 times daily as needed per patient request.  Continue remaining plan as per dayshift team. Will defer further evaluation/management to the day shift primary attending care team. Patient denies any further complaints or concerns.     Nursing to notify Hospitalist for further/continued concerns. Will remain available overnight for further concerns if nursing/patient needs. Please note, there are RRT systems in this hospital in place that if nursing has acute or critical patient condition change or concern, this is to help facilitate and notify that patient needs immediate bedside evaluation by a provider.     Non-billable note.

## 2025-01-19 NOTE — PROGRESS NOTES
End of Shift Note    Bedside shift change report given to RN (oncoming nurse) by Marsha Holliday RN (offgoing nurse).  Report included the following information SBAR, Kardex, Intake/Output, MAR, Accordion, and Recent Results    Shift worked:  7a-7p     Shift summary and any significant changes:    Amio gtt changed to 0/5mcg/min. Pt spent most of the day in the chair, only smear for BM today.   Concerns for physician to address:    Zone phone for oncoming shift:       Activity:  Level of Assistance: Minimal assist, patient does 75% or more  Number times ambulated in hallways past shift: 0  Number of times OOB to chair past shift: 1    Cardiac:   Cardiac Monitoring: Yes      Cardiac Rhythm: Atrial fib    Access:  Current line(s): PIV     Genitourinary:        Respiratory:   O2 Device: Nasal cannula  Chronic home O2 use?: NO  Incentive spirometer at bedside: YES    GI:  Last BM (including prior to admit): 01/17/25  Current diet:  ADULT DIET; Regular; 4 carb choices (60 gm/meal); Low Fat/Low Chol/High Fiber/CALISTA  Passing flatus: YES    Pain Management:   Patient states pain is manageable on current regimen: YES    Skin:  Hilario Scale Score: 18  Interventions: Wound Offloading (Prevention Methods): Repositioning    Patient Safety:  Fall Risk: Nursing Judgement-Fall Risk High(Add Comments): Yes  Fall Risk Interventions  Nursing Judgement-Fall Risk High(Add Comments): Yes  Toilet Every 2 Hours-In Advance of Need: Yes  Hourly Visual Checks: Eyes closed, In bed  Fall Visual Posted: Armband  Room Door Open: Deferred for droplet isolation  Alarm On: Chair, Bed  Patient Moved Closer to Nursing Station: No    Active Consults:   IP CONSULT TO CARDIOLOGY  IP CONSULT TO SPIRITUAL SERVICES    Length of Stay:  Expected LOS: 3  Actual LOS: 2    Marsha Holliday RN

## 2025-01-20 ENCOUNTER — APPOINTMENT (OUTPATIENT)
Facility: HOSPITAL | Age: 65
DRG: 871 | End: 2025-01-20
Payer: MEDICARE

## 2025-01-20 LAB
ANION GAP SERPL CALC-SCNC: 7 MMOL/L (ref 2–12)
BASOPHILS # BLD: 0.07 K/UL (ref 0–0.1)
BASOPHILS NFR BLD: 2 % (ref 0–1)
BUN SERPL-MCNC: 6 MG/DL (ref 6–20)
BUN/CREAT SERPL: 8 (ref 12–20)
CALCIUM SERPL-MCNC: 8.8 MG/DL (ref 8.5–10.1)
CHLORIDE SERPL-SCNC: 104 MMOL/L (ref 97–108)
CO2 SERPL-SCNC: 27 MMOL/L (ref 21–32)
CREAT SERPL-MCNC: 0.8 MG/DL (ref 0.55–1.02)
DIFFERENTIAL METHOD BLD: ABNORMAL
EKG DIAGNOSIS: NORMAL
EKG Q-T INTERVAL: 320 MS
EKG QRS DURATION: 80 MS
EKG QTC CALCULATION (BAZETT): 430 MS
EKG R AXIS: 41 DEGREES
EKG T AXIS: 113 DEGREES
EKG VENTRICULAR RATE: 109 BPM
EOSINOPHIL # BLD: 0 K/UL (ref 0–0.4)
EOSINOPHIL NFR BLD: 0 % (ref 0–7)
ERYTHROCYTE [DISTWIDTH] IN BLOOD BY AUTOMATED COUNT: 17.3 % (ref 11.5–14.5)
GLUCOSE SERPL-MCNC: 98 MG/DL (ref 65–100)
HCT VFR BLD AUTO: 35.3 % (ref 35–47)
HGB BLD-MCNC: 11.4 G/DL (ref 11.5–16)
IMM GRANULOCYTES # BLD AUTO: 0 K/UL (ref 0–0.04)
IMM GRANULOCYTES NFR BLD AUTO: 0 % (ref 0–0.5)
LYMPHOCYTES # BLD: 0.61 K/UL (ref 0.8–3.5)
LYMPHOCYTES NFR BLD: 18 % (ref 12–49)
MAGNESIUM SERPL-MCNC: 1.6 MG/DL (ref 1.6–2.4)
MCH RBC QN AUTO: 27.7 PG (ref 26–34)
MCHC RBC AUTO-ENTMCNC: 32.3 G/DL (ref 30–36.5)
MCV RBC AUTO: 85.7 FL (ref 80–99)
MONOCYTES # BLD: 0.17 K/UL (ref 0–1)
MONOCYTES NFR BLD: 5 % (ref 5–13)
NEUTS BAND NFR BLD MANUAL: 2 %
NEUTS SEG # BLD: 2.55 K/UL (ref 1.8–8)
NEUTS SEG NFR BLD: 73 % (ref 32–75)
NRBC # BLD: 0 K/UL (ref 0–0.01)
NRBC BLD-RTO: 0 PER 100 WBC
PLATELET # BLD AUTO: 135 K/UL (ref 150–400)
PMV BLD AUTO: 10.6 FL (ref 8.9–12.9)
POTASSIUM SERPL-SCNC: 3.7 MMOL/L (ref 3.5–5.1)
RBC # BLD AUTO: 4.12 M/UL (ref 3.8–5.2)
RBC MORPH BLD: ABNORMAL
SODIUM SERPL-SCNC: 138 MMOL/L (ref 136–145)
T4 FREE SERPL-MCNC: 1.8 NG/DL (ref 0.8–1.5)
TSH SERPL DL<=0.05 MIU/L-ACNC: 2.46 UIU/ML (ref 0.36–3.74)
WBC # BLD AUTO: 3.4 K/UL (ref 3.6–11)
WBC MORPH BLD: ABNORMAL

## 2025-01-20 PROCEDURE — 2700000000 HC OXYGEN THERAPY PER DAY

## 2025-01-20 PROCEDURE — 6370000000 HC RX 637 (ALT 250 FOR IP): Performed by: STUDENT IN AN ORGANIZED HEALTH CARE EDUCATION/TRAINING PROGRAM

## 2025-01-20 PROCEDURE — 2580000003 HC RX 258: Performed by: INTERNAL MEDICINE

## 2025-01-20 PROCEDURE — 2500000003 HC RX 250 WO HCPCS: Performed by: STUDENT IN AN ORGANIZED HEALTH CARE EDUCATION/TRAINING PROGRAM

## 2025-01-20 PROCEDURE — 2060000000 HC ICU INTERMEDIATE R&B

## 2025-01-20 PROCEDURE — 84443 ASSAY THYROID STIM HORMONE: CPT

## 2025-01-20 PROCEDURE — 6360000002 HC RX W HCPCS: Performed by: STUDENT IN AN ORGANIZED HEALTH CARE EDUCATION/TRAINING PROGRAM

## 2025-01-20 PROCEDURE — 85025 COMPLETE CBC W/AUTO DIFF WBC: CPT

## 2025-01-20 PROCEDURE — 83735 ASSAY OF MAGNESIUM: CPT

## 2025-01-20 PROCEDURE — 6360000002 HC RX W HCPCS: Performed by: INTERNAL MEDICINE

## 2025-01-20 PROCEDURE — 36415 COLL VENOUS BLD VENIPUNCTURE: CPT

## 2025-01-20 PROCEDURE — 80048 BASIC METABOLIC PNL TOTAL CA: CPT

## 2025-01-20 PROCEDURE — 84439 ASSAY OF FREE THYROXINE: CPT

## 2025-01-20 PROCEDURE — 93306 TTE W/DOPPLER COMPLETE: CPT

## 2025-01-20 RX ORDER — MAGNESIUM SULFATE IN WATER 40 MG/ML
2000 INJECTION, SOLUTION INTRAVENOUS ONCE
Status: COMPLETED | OUTPATIENT
Start: 2025-01-20 | End: 2025-01-20

## 2025-01-20 RX ADMIN — METOPROLOL TARTRATE 12.5 MG: 25 TABLET, FILM COATED ORAL at 10:01

## 2025-01-20 RX ADMIN — MAGNESIUM SULFATE HEPTAHYDRATE 2000 MG: 40 INJECTION, SOLUTION INTRAVENOUS at 10:06

## 2025-01-20 RX ADMIN — AZITHROMYCIN 500 MG: 250 TABLET, FILM COATED ORAL at 10:01

## 2025-01-20 RX ADMIN — DULOXETINE HYDROCHLORIDE 30 MG: 30 CAPSULE, DELAYED RELEASE ORAL at 10:01

## 2025-01-20 RX ADMIN — GUAIFENESIN 600 MG: 600 TABLET ORAL at 10:01

## 2025-01-20 RX ADMIN — GUAIFENESIN 600 MG: 600 TABLET ORAL at 20:57

## 2025-01-20 RX ADMIN — POTASSIUM BICARBONATE 20 MEQ: 782 TABLET, EFFERVESCENT ORAL at 10:01

## 2025-01-20 RX ADMIN — AMIODARONE HYDROCHLORIDE 0.5 MG/MIN: 50 INJECTION, SOLUTION INTRAVENOUS at 06:31

## 2025-01-20 RX ADMIN — PANTOPRAZOLE SODIUM 40 MG: 40 TABLET, DELAYED RELEASE ORAL at 06:32

## 2025-01-20 RX ADMIN — OSELTAMIVIR PHOSPHATE 75 MG: 75 CAPSULE ORAL at 10:01

## 2025-01-20 RX ADMIN — AMIODARONE HYDROCHLORIDE 0.5 MG/MIN: 50 INJECTION, SOLUTION INTRAVENOUS at 22:29

## 2025-01-20 RX ADMIN — OSELTAMIVIR PHOSPHATE 75 MG: 75 CAPSULE ORAL at 20:57

## 2025-01-20 RX ADMIN — DULOXETINE HYDROCHLORIDE 30 MG: 30 CAPSULE, DELAYED RELEASE ORAL at 20:57

## 2025-01-20 RX ADMIN — APIXABAN 5 MG: 5 TABLET, FILM COATED ORAL at 12:20

## 2025-01-20 RX ADMIN — METOPROLOL TARTRATE 12.5 MG: 25 TABLET, FILM COATED ORAL at 20:57

## 2025-01-20 RX ADMIN — ROSUVASTATIN CALCIUM 20 MG: 20 TABLET, FILM COATED ORAL at 20:57

## 2025-01-20 RX ADMIN — APIXABAN 5 MG: 5 TABLET, FILM COATED ORAL at 00:08

## 2025-01-20 RX ADMIN — SODIUM CHLORIDE, PRESERVATIVE FREE 10 ML: 5 INJECTION INTRAVENOUS at 10:02

## 2025-01-20 RX ADMIN — WATER 1000 MG: 1 INJECTION INTRAMUSCULAR; INTRAVENOUS; SUBCUTANEOUS at 00:08

## 2025-01-20 ASSESSMENT — PAIN SCALES - GENERAL
PAINLEVEL_OUTOF10: 0
PAINLEVEL_OUTOF10: 0

## 2025-01-20 NOTE — PROGRESS NOTES
[facility]        Progress Note    NAME: Aide Bansal   :  1960   MRN:  [id number]     Date/Time:  2025 8:16 PM    Patient PCP: Eugenie Stanford APRN - NP  ________________________________________________________________________     Assessment:     AFib with RVR  MOSLEY  HTN  HFpEF  Dyslipidemia  GLENYS  Acute Hypoxemic Respiratory Failure  GERD  Morbid Obesity  Flu A positive  Hypokalemia  Sepsis  Neutropenia  Thrombocytopenia    Full Code   and raising 12 yo grandchild  -t/-+/-s      Plan:   Received Amiodarone bolus and drip. Will add Amio 400 bid po x 5 days and then daily. Restart home Metoprolol at half dose. Was in NSR 25. On Bipap. Hopefully will convert with pharmacologic measures. If not will plan on DCCV when more stable. Afib ablation should be considered. Check TSH and Mag and update TTE. Continue Eliquis.     Dr. Cleary will see over the weekend.      update:  TSH and free T4 ordered, don't see it in results.  Mag OK.  Echo pending.  She remains in atrial fibrillation and on amiodarone infusion.  I'll hold oral while on IV.  QT acceptable on concomitant azithromycin.     update:  Amio decreased to 0.5.  She's back in sinus rhythm.     [x]       High complexity decision making was performed in this patient at high risk for decompensation with multiple organ involvement.      Subjective:   CHIEF COMPLAINT: Afib with RVR    HISTORY OF PRESENT ILLNESS:     Aide is a 64 y.o.   female who presented to the [facility] Ed yesterday with worsening shortness of breath. She was diagnosed with Flu A on 2025 in the ED. She was in NSR at that time. She went home and had had poor intake and worsening symptoms. She returned with progressive and worsening shortness of breath, mosley and generalized fatigue and weakness with poor appetite. She denies chest pain, palpitations and has chronic leg edema.  We were asked to consult for work up and evaluation of

## 2025-01-20 NOTE — PROGRESS NOTES
Hospitalist Progress Note    NAME:   Aide Bansal   : 1960   MRN: [id number]     Date/Time: 2025 2:11 PM  Patient PCP: Eugenie Stanford APRN - NP    Estimated discharge date:  Barriers: echo, weaning off oxygen, cardio clearance,       Assessment / Plan:  Acute hypoxic respiratory failure POA requiring oxygen supplementation  Severe sepsis-tachycardia plus lactic acidosis  Influenza A  Atrial fibrillation in RVR  Orthostatic hypotension     -On :Noted tachypnea in mid 30s. Diffuse wheezing and coarse breath sounds on examination. CXR noted for interstitial edema- ?fluid overload vs viral pneumonia . Elevated BNP Could not diurese her due to low potassium. Placed on brief BiPAP for fluid overload.  -Lactic acidosis resolved  -Blood culture no growth so far X 2 days  -TFT still pending, reordered     -Continue with oxygen supplementation to maintain saturation greater than 90%. At present on 3l NC, Wean off oxygen as tolerated. Plan for oxygen challenge  on . If she fails then she does not have diagnosis for oxygen approval and needs to stay in hospital for weaning off oxygen.   -Continue with Tamiflu  -Continue with ceftriaxone and azithromycin  -Telemetry noted for sinus rhythm , rate controlled   -Continue with Eliquis .  -Hold losartan , HCTZ due to orthostatic hypotension   - Continue with reduced dose of Metoprolol for now  -Continue with PT/OT   -Continue with incentive spirometry    -Continue with Amiodarone drip, likely to switch to oral on  .   - Cardiology following , awaiting recs         Hypokalemia- resolved   -Potassium 3.7 , Mg- 1.6   Supplemented potassium and Magnesium     Thrombocytopenia- improving   Platelet count-135  Continue to trend    Diarrhea  RN informed no definitive BM for collection for sample.   C diff and Enteric panel order cancelled     HLD-Continue with statin   GERD  Formulary substitution Protonix     MDD, SARAH  Continue PTA duloxetine

## 2025-01-20 NOTE — PROGRESS NOTES
Occupational Therapy    Patient chart reviewed up to date. Attempted visit, however patient YANIRA. Note patient unable to progress to standing at evaluation. Pending performance next session, may need to consider rehab as patient independent and active at baseline.     Shannan Winchester, OTR/L

## 2025-01-20 NOTE — PROGRESS NOTES
End of Shift Note    Bedside shift change report given to R (oncoming nurse) by Marsha Holliday RN (offgoing nurse).  Report included the following information SBAR, Kardex, Intake/Output, MAR, Accordion, and Recent Results    Shift worked:  7a-7p     Shift summary and any significant changes:    Patient continues to wean O2 to 2L/min. Amio gtt remains at 16.7mL/hr. Spent most of the day in the chair. Goals for d/c include weaning O2 to RA, and d/c of amio gtt.   Concerns for physician to address:  None     Zone phone for oncoming shift:       Activity:  Level of Assistance: Minimal assist, patient does 75% or more  Number times ambulated in hallways past shift: 0  Number of times OOB to chair past shift: 3    Cardiac:   Cardiac Monitoring: Yes      Cardiac Rhythm: Sinus rhythm    Access:  Current line(s): PIV     Genitourinary:        Respiratory:   O2 Device: Nasal cannula  Chronic home O2 use?: NO  Incentive spirometer at bedside: YES    GI:  Last BM (including prior to admit): 01/17/25  Current diet:  ADULT DIET; Regular; 4 carb choices (60 gm/meal); Low Fat/Low Chol/High Fiber/CALISTA  DIET ONE TIME MESSAGE;  Passing flatus: YES    Pain Management:   Patient states pain is manageable on current regimen: YES    Skin:  Hilario Scale Score: 20  Interventions: Wound Offloading (Prevention Methods): Repositioning    Patient Safety:  Fall Risk: Nursing Judgement-Fall Risk High(Add Comments): Yes  Fall Risk Interventions  Nursing Judgement-Fall Risk High(Add Comments): Yes  Toilet Every 2 Hours-In Advance of Need: Yes  Hourly Visual Checks: Eyes closed, In bed  Fall Visual Posted: Armband  Room Door Open: Deferred for droplet isolation  Alarm On: Chair, Bed  Patient Moved Closer to Nursing Station: No    Active Consults:   IP CONSULT TO CARDIOLOGY  IP CONSULT TO SPIRITUAL SERVICES    Length of Stay:  Expected LOS: 5  Actual LOS: 3    Marsha Holliday RN

## 2025-01-21 LAB
ANION GAP SERPL CALC-SCNC: 5 MMOL/L (ref 2–12)
BACTERIA SPEC CULT: NORMAL
BACTERIA SPEC CULT: NORMAL
BASOPHILS # BLD: 0 K/UL (ref 0–0.1)
BASOPHILS NFR BLD: 0 % (ref 0–1)
BUN SERPL-MCNC: 7 MG/DL (ref 6–20)
BUN/CREAT SERPL: 9 (ref 12–20)
CALCIUM SERPL-MCNC: 8.9 MG/DL (ref 8.5–10.1)
CHLORIDE SERPL-SCNC: 101 MMOL/L (ref 97–108)
CO2 SERPL-SCNC: 29 MMOL/L (ref 21–32)
CREAT SERPL-MCNC: 0.82 MG/DL (ref 0.55–1.02)
DIFFERENTIAL METHOD BLD: ABNORMAL
ECHO AO ROOT DIAM: 2.8 CM
ECHO AO ROOT INDEX: 1.18 CM/M2
ECHO AV MEAN GRADIENT: 3 MMHG
ECHO AV MEAN VELOCITY: 0.9 M/S
ECHO AV PEAK GRADIENT: 6 MMHG
ECHO AV PEAK VELOCITY: 1.2 M/S
ECHO AV VELOCITY RATIO: 0.92
ECHO AV VTI: 27.3 CM
ECHO BSA: 2.52 M2
ECHO EST RA PRESSURE: 8 MMHG
ECHO LA DIAMETER INDEX: 1.86 CM/M2
ECHO LA DIAMETER: 4.4 CM
ECHO LA TO AORTIC ROOT RATIO: 1.57
ECHO LV EF PHYSICIAN: 50 %
ECHO LV FRACTIONAL SHORTENING: 41 % (ref 28–44)
ECHO LV INTERNAL DIMENSION DIASTOLE INDEX: 2.57 CM/M2
ECHO LV INTERNAL DIMENSION DIASTOLIC: 6.1 CM (ref 3.9–5.3)
ECHO LV INTERNAL DIMENSION SYSTOLIC INDEX: 1.52 CM/M2
ECHO LV INTERNAL DIMENSION SYSTOLIC: 3.6 CM
ECHO LV IVSD: 0.9 CM (ref 0.6–0.9)
ECHO LV MASS 2D: 270.5 G (ref 67–162)
ECHO LV MASS INDEX 2D: 114.1 G/M2 (ref 43–95)
ECHO LV POSTERIOR WALL DIASTOLIC: 1.2 CM (ref 0.6–0.9)
ECHO LV RELATIVE WALL THICKNESS RATIO: 0.39
ECHO LVOT AV VTI INDEX: 0.89
ECHO LVOT MEAN GRADIENT: 3 MMHG
ECHO LVOT PEAK GRADIENT: 5 MMHG
ECHO LVOT PEAK VELOCITY: 1.1 M/S
ECHO LVOT VTI: 24.3 CM
ECHO MV A VELOCITY: 0.67 M/S
ECHO MV E DECELERATION TIME (DT): 238.8 MS
ECHO MV E VELOCITY: 0.67 M/S
ECHO MV E/A RATIO: 1
ECHO RIGHT VENTRICULAR SYSTOLIC PRESSURE (RVSP): 29 MMHG
ECHO TV REGURGITANT MAX VELOCITY: 2.28 M/S
ECHO TV REGURGITANT PEAK GRADIENT: 17 MMHG
EOSINOPHIL # BLD: 0.02 K/UL (ref 0–0.4)
EOSINOPHIL NFR BLD: 0.5 % (ref 0–7)
ERYTHROCYTE [DISTWIDTH] IN BLOOD BY AUTOMATED COUNT: 17.2 % (ref 11.5–14.5)
GLUCOSE SERPL-MCNC: 98 MG/DL (ref 65–100)
HCT VFR BLD AUTO: 33.8 % (ref 35–47)
HGB BLD-MCNC: 11.2 G/DL (ref 11.5–16)
IMM GRANULOCYTES # BLD AUTO: 0.04 K/UL (ref 0–0.04)
IMM GRANULOCYTES NFR BLD AUTO: 0.9 % (ref 0–0.5)
LYMPHOCYTES # BLD: 0.57 K/UL (ref 0.8–3.5)
LYMPHOCYTES NFR BLD: 13.2 % (ref 12–49)
MCH RBC QN AUTO: 28 PG (ref 26–34)
MCHC RBC AUTO-ENTMCNC: 33.1 G/DL (ref 30–36.5)
MCV RBC AUTO: 84.5 FL (ref 80–99)
MONOCYTES # BLD: 0.55 K/UL (ref 0–1)
MONOCYTES NFR BLD: 12.8 % (ref 5–13)
NEUTS SEG # BLD: 3.12 K/UL (ref 1.8–8)
NEUTS SEG NFR BLD: 72.6 % (ref 32–75)
NRBC # BLD: 0 K/UL (ref 0–0.01)
NRBC BLD-RTO: 0 PER 100 WBC
PLATELET # BLD AUTO: 166 K/UL (ref 150–400)
PMV BLD AUTO: 10.3 FL (ref 8.9–12.9)
POTASSIUM SERPL-SCNC: 3.4 MMOL/L (ref 3.5–5.1)
RBC # BLD AUTO: 4 M/UL (ref 3.8–5.2)
RBC MORPH BLD: ABNORMAL
RBC MORPH BLD: ABNORMAL
SERVICE CMNT-IMP: NORMAL
SERVICE CMNT-IMP: NORMAL
SODIUM SERPL-SCNC: 135 MMOL/L (ref 136–145)
WBC # BLD AUTO: 4.3 K/UL (ref 3.6–11)

## 2025-01-21 PROCEDURE — 80048 BASIC METABOLIC PNL TOTAL CA: CPT

## 2025-01-21 PROCEDURE — 97116 GAIT TRAINING THERAPY: CPT

## 2025-01-21 PROCEDURE — 6370000000 HC RX 637 (ALT 250 FOR IP): Performed by: STUDENT IN AN ORGANIZED HEALTH CARE EDUCATION/TRAINING PROGRAM

## 2025-01-21 PROCEDURE — 97535 SELF CARE MNGMENT TRAINING: CPT

## 2025-01-21 PROCEDURE — 97530 THERAPEUTIC ACTIVITIES: CPT

## 2025-01-21 PROCEDURE — 2500000003 HC RX 250 WO HCPCS: Performed by: STUDENT IN AN ORGANIZED HEALTH CARE EDUCATION/TRAINING PROGRAM

## 2025-01-21 PROCEDURE — 6370000000 HC RX 637 (ALT 250 FOR IP): Performed by: INTERNAL MEDICINE

## 2025-01-21 PROCEDURE — 85025 COMPLETE CBC W/AUTO DIFF WBC: CPT

## 2025-01-21 PROCEDURE — 2060000000 HC ICU INTERMEDIATE R&B

## 2025-01-21 PROCEDURE — 6360000002 HC RX W HCPCS: Performed by: STUDENT IN AN ORGANIZED HEALTH CARE EDUCATION/TRAINING PROGRAM

## 2025-01-21 RX ORDER — LOPERAMIDE HYDROCHLORIDE 2 MG/1
2 CAPSULE ORAL 4 TIMES DAILY PRN
Status: DISCONTINUED | OUTPATIENT
Start: 2025-01-21 | End: 2025-01-23 | Stop reason: HOSPADM

## 2025-01-21 RX ORDER — POTASSIUM CHLORIDE 750 MG/1
40 TABLET, EXTENDED RELEASE ORAL ONCE
Status: COMPLETED | OUTPATIENT
Start: 2025-01-21 | End: 2025-01-21

## 2025-01-21 RX ADMIN — ACETAMINOPHEN 650 MG: 325 TABLET ORAL at 11:54

## 2025-01-21 RX ADMIN — WATER 1000 MG: 1 INJECTION INTRAMUSCULAR; INTRAVENOUS; SUBCUTANEOUS at 01:29

## 2025-01-21 RX ADMIN — METOPROLOL TARTRATE 12.5 MG: 25 TABLET, FILM COATED ORAL at 09:30

## 2025-01-21 RX ADMIN — ROSUVASTATIN CALCIUM 20 MG: 20 TABLET, FILM COATED ORAL at 21:11

## 2025-01-21 RX ADMIN — PANTOPRAZOLE SODIUM 40 MG: 40 TABLET, DELAYED RELEASE ORAL at 06:16

## 2025-01-21 RX ADMIN — GUAIFENESIN 600 MG: 600 TABLET ORAL at 21:11

## 2025-01-21 RX ADMIN — APIXABAN 5 MG: 5 TABLET, FILM COATED ORAL at 11:54

## 2025-01-21 RX ADMIN — OSELTAMIVIR PHOSPHATE 75 MG: 75 CAPSULE ORAL at 09:30

## 2025-01-21 RX ADMIN — DULOXETINE HYDROCHLORIDE 30 MG: 30 CAPSULE, DELAYED RELEASE ORAL at 21:11

## 2025-01-21 RX ADMIN — DULOXETINE HYDROCHLORIDE 30 MG: 30 CAPSULE, DELAYED RELEASE ORAL at 09:30

## 2025-01-21 RX ADMIN — SODIUM CHLORIDE, PRESERVATIVE FREE 10 ML: 5 INJECTION INTRAVENOUS at 21:13

## 2025-01-21 RX ADMIN — GUAIFENESIN 600 MG: 600 TABLET ORAL at 09:30

## 2025-01-21 RX ADMIN — SODIUM CHLORIDE, PRESERVATIVE FREE 10 ML: 5 INJECTION INTRAVENOUS at 09:31

## 2025-01-21 RX ADMIN — METOPROLOL TARTRATE 12.5 MG: 25 TABLET, FILM COATED ORAL at 21:11

## 2025-01-21 RX ADMIN — AZITHROMYCIN 500 MG: 250 TABLET, FILM COATED ORAL at 09:30

## 2025-01-21 RX ADMIN — POTASSIUM CHLORIDE 40 MEQ: 750 TABLET, EXTENDED RELEASE ORAL at 09:36

## 2025-01-21 RX ADMIN — APIXABAN 5 MG: 5 TABLET, FILM COATED ORAL at 23:39

## 2025-01-21 RX ADMIN — APIXABAN 5 MG: 5 TABLET, FILM COATED ORAL at 01:29

## 2025-01-21 ASSESSMENT — PAIN SCALES - GENERAL
PAINLEVEL_OUTOF10: 0

## 2025-01-21 ASSESSMENT — PAIN DESCRIPTION - LOCATION: LOCATION: HEAD

## 2025-01-21 ASSESSMENT — PAIN DESCRIPTION - DESCRIPTORS: DESCRIPTORS: ACHING

## 2025-01-21 NOTE — PROGRESS NOTES
End of Shift Note    Bedside shift change report given to Yumi RAMOS (oncoming nurse) by ALESHA CHERRY, RICHARD (offgoing nurse).  Report included the following information SBAR    Shift worked:  7am-7pm     Shift summary and any significant changes:     No significant change noted off from o2 since  1700 o2 sat 92-94% Amio  drip stop      Concerns for physician to address:  none     Zone phone for oncoming shift:          Activity:  Level of Assistance: Minimal assist, patient does 75% or more  Number times ambulated in hallways past shift: 0  Number of times OOB to chair past shift: 0    Cardiac:   Cardiac Monitoring: Yes      Cardiac Rhythm: Sinus rhythm    Access:  Current line(s): PIV     Genitourinary:   Urinary Status: Voiding    Respiratory:   O2 Device: None (Room air)  Chronic home O2 use?: NO  Incentive spirometer at bedside: NO    GI:  Last BM (including prior to admit): 01/21/25  Current diet:  ADULT DIET; Regular; 4 carb choices (60 gm/meal); Low Fat/Low Chol/High Fiber/CALISTA  DIET ONE TIME MESSAGE;  Passing flatus: YES    Pain Management:   Patient states pain is manageable on current regimen: YES    Skin:  Hilario Scale Score: 21  Interventions: Wound Offloading (Prevention Methods): Pillows, Repositioning    Patient Safety:  Fall Risk: Nursing Judgement-Fall Risk High(Add Comments): Yes  Fall Risk Interventions  Nursing Judgement-Fall Risk High(Add Comments): Yes  Toilet Every 2 Hours-In Advance of Need: No (Comment)  Hourly Visual Checks: Eyes closed, In bed  Fall Visual Posted: Armband  Room Door Open: Deferred for droplet isolation  Alarm On: Chair, Bed  Patient Moved Closer to Nursing Station: No    Active Consults:   IP CONSULT TO CARDIOLOGY  IP CONSULT TO SPIRITUAL SERVICES    Length of Stay:  Expected LOS: 6  Actual LOS: 4    ALESHA CHERRY, RN

## 2025-01-21 NOTE — PROGRESS NOTES
Hospitalist Progress Note    NAME:   Aide Bansal   : 1960   MRN: [id number]     Date/Time: 2025 2:47 PM  Patient PCP: Eugenie Stanford APRN - NP    Estimated discharge date:  Barriers:       Assessment / Plan:  Acute hypoxic respiratory failure POA requiring oxygen supplementation  Severe sepsis-tachycardia plus lactic acidosis  Influenza A  Atrial fibrillation in RVR  Orthostatic hypotension     -On :Noted tachypnea in mid 30s. Diffuse wheezing and coarse breath sounds on examination. CXR noted for interstitial edema- ?fluid overload vs viral pneumonia . Elevated BNP Could not diurese her due to low potassium. Placed on brief BiPAP for fluid overload.  -Lactic acidosis resolved  -Blood culture no growth so far X 2 days  -TFT still pending, reordered     -Continue with oxygen supplementation to maintain saturation greater than 90%. At present on 3l NC, Wean off oxygen as tolerated. Plan for oxygen challenge  on . If she fails then she does not have diagnosis for oxygen approval and needs to stay in hospital for weaning off oxygen.   -Continue with Tamiflu  -Continue with ceftriaxone and azithromycin  -Telemetry noted for sinus rhythm , rate controlled   -Continue with Eliquis .  -Hold losartan , HCTZ due to orthostatic hypotension   - Continue with reduced dose of Metoprolol for now  -Continue with PT/OT   -Continue with incentive spirometry    -Continue with Amiodarone drip, likely to switch to oral on  .   - Cardiology following , awaiting recs   :  -Continue Eliquis and also metoprolol.  Cardiology recommendations noted.  Currently on 2 L nasal cannula and wean as tolerated.  2D echocardiogram shows EF of around 50 to 55%.            Hypokalemia- resolved   -Potassium replaced.  Recheck in a.m.    Thrombocytopenia- improving   Platelet count-135  Continue to trend    Diarrhea  RN informed no definitive BM for collection for sample.   C diff and Enteric panel order cancelled

## 2025-01-21 NOTE — PROGRESS NOTES
[facility]        Progress Note    NAME: Aide Bansal   :  1960   MRN:  [id number]     Date/Time:  2025 7:55 AM    Patient PCP: Eugenie Stanford APRN - NP  ________________________________________________________________________     Assessment:     AFib with RVR  MOSLEY  HTN  HFpEF  Dyslipidemia  GLENYS  Acute Hypoxemic Respiratory Failure  GERD  Morbid Obesity  Flu A positive  Hypokalemia  Sepsis  Neutropenia  Thrombocytopenia    Full Code   and raising 12 yo grandchild  -t/-+/-s      Plan:   Received Amiodarone bolus and drip. Will add Amio 400 bid po x 5 days and then daily. Restart home Metoprolol at half dose. Was in NSR 25. On Bipap. Hopefully will convert with pharmacologic measures. If not will plan on DCCV when more stable. Afib ablation should be considered. Check TSH and Mag and update TTE. Continue Eliquis.     Dr. Cleary will see over the weekend.      update:  TSH and free T4 ordered, don't see it in results.  Mag OK.  Echo pending.  She remains in atrial fibrillation and on amiodarone infusion.  I'll hold oral while on IV.  QT acceptable on concomitant azithromycin.     update:  Amio decreased to 0.5.  She's back in sinus rhythm.    : OK to go home with po eliquis and po metoprolol XL 50 mg daily. No po amiodarone.      [x]       High complexity decision making was performed in this patient at high risk for decompensation with multiple organ involvement.      Subjective:   CHIEF COMPLAINT: Afib with RVR    HISTORY OF PRESENT ILLNESS:     Aide is a 64 y.o.   female who presented to the [facility] Ed yesterday with worsening shortness of breath. She was diagnosed with Flu A on 2025 in the ED. She was in NSR at that time. She went home and had had poor intake and worsening symptoms. She returned with progressive and worsening shortness of breath, mosley and generalized fatigue and weakness with poor appetite. She denies chest pain,

## 2025-01-22 ENCOUNTER — APPOINTMENT (OUTPATIENT)
Facility: HOSPITAL | Age: 65
DRG: 871 | End: 2025-01-22
Payer: MEDICARE

## 2025-01-22 LAB
ANION GAP SERPL CALC-SCNC: 7 MMOL/L (ref 2–12)
BASOPHILS # BLD: 0.01 K/UL (ref 0–0.1)
BASOPHILS NFR BLD: 0.2 % (ref 0–1)
BUN SERPL-MCNC: 7 MG/DL (ref 6–20)
BUN/CREAT SERPL: 9 (ref 12–20)
CALCIUM SERPL-MCNC: 9.3 MG/DL (ref 8.5–10.1)
CHLORIDE SERPL-SCNC: 103 MMOL/L (ref 97–108)
CO2 SERPL-SCNC: 25 MMOL/L (ref 21–32)
CREAT SERPL-MCNC: 0.8 MG/DL (ref 0.55–1.02)
DIFFERENTIAL METHOD BLD: ABNORMAL
EOSINOPHIL # BLD: 0.02 K/UL (ref 0–0.4)
EOSINOPHIL NFR BLD: 0.4 % (ref 0–7)
ERYTHROCYTE [DISTWIDTH] IN BLOOD BY AUTOMATED COUNT: 17.4 % (ref 11.5–14.5)
GLUCOSE SERPL-MCNC: 98 MG/DL (ref 65–100)
HCT VFR BLD AUTO: 35.2 % (ref 35–47)
HGB BLD-MCNC: 11.7 G/DL (ref 11.5–16)
IMM GRANULOCYTES # BLD AUTO: 0.05 K/UL (ref 0–0.04)
IMM GRANULOCYTES NFR BLD AUTO: 1 % (ref 0–0.5)
LYMPHOCYTES # BLD: 0.62 K/UL (ref 0.8–3.5)
LYMPHOCYTES NFR BLD: 12.1 % (ref 12–49)
MCH RBC QN AUTO: 28 PG (ref 26–34)
MCHC RBC AUTO-ENTMCNC: 33.2 G/DL (ref 30–36.5)
MCV RBC AUTO: 84.2 FL (ref 80–99)
MONOCYTES # BLD: 0.58 K/UL (ref 0–1)
MONOCYTES NFR BLD: 11.3 % (ref 5–13)
NEUTS SEG # BLD: 3.84 K/UL (ref 1.8–8)
NEUTS SEG NFR BLD: 75 % (ref 32–75)
NRBC # BLD: 0 K/UL (ref 0–0.01)
NRBC BLD-RTO: 0 PER 100 WBC
PLATELET # BLD AUTO: 239 K/UL (ref 150–400)
PMV BLD AUTO: 9.8 FL (ref 8.9–12.9)
POTASSIUM SERPL-SCNC: 4.2 MMOL/L (ref 3.5–5.1)
RBC # BLD AUTO: 4.18 M/UL (ref 3.8–5.2)
SODIUM SERPL-SCNC: 135 MMOL/L (ref 136–145)
WBC # BLD AUTO: 5.1 K/UL (ref 3.6–11)

## 2025-01-22 PROCEDURE — 2060000000 HC ICU INTERMEDIATE R&B

## 2025-01-22 PROCEDURE — 6370000000 HC RX 637 (ALT 250 FOR IP): Performed by: STUDENT IN AN ORGANIZED HEALTH CARE EDUCATION/TRAINING PROGRAM

## 2025-01-22 PROCEDURE — 85025 COMPLETE CBC W/AUTO DIFF WBC: CPT

## 2025-01-22 PROCEDURE — 2500000003 HC RX 250 WO HCPCS: Performed by: STUDENT IN AN ORGANIZED HEALTH CARE EDUCATION/TRAINING PROGRAM

## 2025-01-22 PROCEDURE — 36415 COLL VENOUS BLD VENIPUNCTURE: CPT

## 2025-01-22 PROCEDURE — 6370000000 HC RX 637 (ALT 250 FOR IP): Performed by: INTERNAL MEDICINE

## 2025-01-22 PROCEDURE — 80048 BASIC METABOLIC PNL TOTAL CA: CPT

## 2025-01-22 PROCEDURE — 71045 X-RAY EXAM CHEST 1 VIEW: CPT

## 2025-01-22 RX ADMIN — LOPERAMIDE HYDROCHLORIDE 2 MG: 2 CAPSULE ORAL at 03:59

## 2025-01-22 RX ADMIN — ACETAMINOPHEN 650 MG: 325 TABLET ORAL at 17:38

## 2025-01-22 RX ADMIN — DULOXETINE HYDROCHLORIDE 30 MG: 30 CAPSULE, DELAYED RELEASE ORAL at 10:10

## 2025-01-22 RX ADMIN — GUAIFENESIN 600 MG: 600 TABLET ORAL at 10:10

## 2025-01-22 RX ADMIN — METOPROLOL TARTRATE 12.5 MG: 25 TABLET, FILM COATED ORAL at 10:09

## 2025-01-22 RX ADMIN — SODIUM CHLORIDE, PRESERVATIVE FREE 10 ML: 5 INJECTION INTRAVENOUS at 10:11

## 2025-01-22 RX ADMIN — SODIUM CHLORIDE, PRESERVATIVE FREE 10 ML: 5 INJECTION INTRAVENOUS at 21:48

## 2025-01-22 RX ADMIN — DULOXETINE HYDROCHLORIDE 30 MG: 30 CAPSULE, DELAYED RELEASE ORAL at 21:46

## 2025-01-22 RX ADMIN — GUAIFENESIN 600 MG: 600 TABLET ORAL at 21:46

## 2025-01-22 RX ADMIN — ROSUVASTATIN CALCIUM 20 MG: 20 TABLET, FILM COATED ORAL at 21:46

## 2025-01-22 RX ADMIN — METOPROLOL TARTRATE 12.5 MG: 25 TABLET, FILM COATED ORAL at 21:46

## 2025-01-22 RX ADMIN — APIXABAN 5 MG: 5 TABLET, FILM COATED ORAL at 12:50

## 2025-01-22 RX ADMIN — PANTOPRAZOLE SODIUM 40 MG: 40 TABLET, DELAYED RELEASE ORAL at 06:25

## 2025-01-22 RX ADMIN — APIXABAN 5 MG: 5 TABLET, FILM COATED ORAL at 23:41

## 2025-01-22 ASSESSMENT — PAIN SCALES - GENERAL
PAINLEVEL_OUTOF10: 0
PAINLEVEL_OUTOF10: 3
PAINLEVEL_OUTOF10: 0
PAINLEVEL_OUTOF10: 0

## 2025-01-22 ASSESSMENT — PAIN DESCRIPTION - DESCRIPTORS: DESCRIPTORS: ACHING

## 2025-01-22 ASSESSMENT — PAIN DESCRIPTION - LOCATION: LOCATION: HEAD

## 2025-01-22 ASSESSMENT — PAIN DESCRIPTION - ORIENTATION: ORIENTATION: ANTERIOR

## 2025-01-22 NOTE — PROGRESS NOTES
0700 Bedside and Verbal shift change report given to Syl RN (oncoming nurse) by Yumi RN (offgoing nurse). Report included the following information Nurse Handoff Report, Index, Adult Overview, Surgery Report, Intake/Output, and MAR.      0745 Shift assessment - see flowsheets     1141 Reassessment completed - see flowsheets     1355 Oxygen challenge completed - see associated progress note     5230-1987 Orthostatic BP obtained - see flowsheets     1521 Reassessment completed - see flowsheets    1738 PRN tylenol PO given - see MAR    1900 Bedside and Verbal shift change report given to Yumi RAMOS (oncoming nurse) by Syl RN (offgoing nurse). Report included the following information Nurse Handoff Report, Index, Adult Overview, Surgery Report, Intake/Output, MAR, and Recent Results.

## 2025-01-22 NOTE — PROGRESS NOTES
Pulse oximetry assessment   91% at rest on room air (if 88% or less, skip next steps)  88% while ambulating on room air  96% at rest on 2 LPM  94% while ambulating on 2 LPM

## 2025-01-22 NOTE — PROGRESS NOTES
Attempted to schedule a PCP hospital follow up. Unable to reach the office and unable to leave a voicemail. [facility] placed [facility] information AVS for patient resource. Pending patient discharge. Saba Cornell, Care Management Assistant

## 2025-01-22 NOTE — PROGRESS NOTES
Hospitalist Progress Note    NAME:   Aide Bansal   : 1960   MRN: [id number]     Date/Time: 2025 2:27 PM  Patient PCP: Eugenie Stanford APRN - NP    Estimated discharge date:  Barriers:       Assessment / Plan:  Acute hypoxic respiratory failure POA requiring oxygen supplementation  Severe sepsis-tachycardia plus lactic acidosis  Influenza A  Atrial fibrillation in RVR  Orthostatic hypotension   Diastolic CHF    -On :Noted tachypnea in mid 30s. Diffuse wheezing and coarse breath sounds on examination. CXR noted for interstitial edema- ?fluid overload vs viral pneumonia . Elevated BNP Could not diurese her due to low potassium. Placed on brief BiPAP for fluid overload.  -Lactic acidosis resolved  -Blood culture no growth so far X 2 days  -TFT still pending, reordered     -Continue with oxygen supplementation to maintain saturation greater than 90%. At present on 3l NC, Wean off oxygen as tolerated. Plan for oxygen challenge  on . If she fails then she does not have diagnosis for oxygen approval and needs to stay in hospital for weaning off oxygen.   -Continue with Tamiflu  -Continue with ceftriaxone and azithromycin  -Telemetry noted for sinus rhythm , rate controlled   -Continue with Eliquis .  -Hold losartan , HCTZ due to orthostatic hypotension   - Continue with reduced dose of Metoprolol for now  -Continue with PT/OT   -Continue with incentive spirometry    -Continue with Amiodarone drip, likely to switch to oral on  .   - Cardiology following , awaiting recs   :  -Continue Eliquis and also metoprolol.  Cardiology recommendations noted.  Currently on 2 L nasal cannula and wean as tolerated.  2D echocardiogram shows EF of around 50 to 55%.  :  -She is still 88% on room air on ambulation.  She does not have a qualifying diagnosis for home oxygen.  Continue incentive spirometry.  Wean O2 as tolerated.  Mobilize.  Will check proBNP.  Will order a chest x-ray as well.

## 2025-01-23 VITALS
HEIGHT: 66 IN | SYSTOLIC BLOOD PRESSURE: 107 MMHG | WEIGHT: 293 LBS | DIASTOLIC BLOOD PRESSURE: 68 MMHG | HEART RATE: 80 BPM | OXYGEN SATURATION: 90 % | TEMPERATURE: 98.2 F | RESPIRATION RATE: 20 BRPM | BODY MASS INDEX: 47.09 KG/M2

## 2025-01-23 LAB
ANION GAP SERPL CALC-SCNC: 7 MMOL/L (ref 2–12)
BASOPHILS # BLD: 0.01 K/UL (ref 0–0.1)
BASOPHILS NFR BLD: 0.2 % (ref 0–1)
BUN SERPL-MCNC: 7 MG/DL (ref 6–20)
BUN/CREAT SERPL: 9 (ref 12–20)
CALCIUM SERPL-MCNC: 9.2 MG/DL (ref 8.5–10.1)
CHLORIDE SERPL-SCNC: 105 MMOL/L (ref 97–108)
CO2 SERPL-SCNC: 25 MMOL/L (ref 21–32)
CREAT SERPL-MCNC: 0.74 MG/DL (ref 0.55–1.02)
DIFFERENTIAL METHOD BLD: ABNORMAL
EOSINOPHIL # BLD: 0.05 K/UL (ref 0–0.4)
EOSINOPHIL NFR BLD: 1.1 % (ref 0–7)
ERYTHROCYTE [DISTWIDTH] IN BLOOD BY AUTOMATED COUNT: 17.3 % (ref 11.5–14.5)
GLUCOSE SERPL-MCNC: 90 MG/DL (ref 65–100)
HCT VFR BLD AUTO: 35.7 % (ref 35–47)
HGB BLD-MCNC: 11.7 G/DL (ref 11.5–16)
IMM GRANULOCYTES # BLD AUTO: 0.07 K/UL (ref 0–0.04)
IMM GRANULOCYTES NFR BLD AUTO: 1.5 % (ref 0–0.5)
LYMPHOCYTES # BLD: 0.58 K/UL (ref 0.8–3.5)
LYMPHOCYTES NFR BLD: 12 % (ref 12–49)
MCH RBC QN AUTO: 28.3 PG (ref 26–34)
MCHC RBC AUTO-ENTMCNC: 32.8 G/DL (ref 30–36.5)
MCV RBC AUTO: 86.4 FL (ref 80–99)
MONOCYTES # BLD: 0.63 K/UL (ref 0–1)
MONOCYTES NFR BLD: 13.1 % (ref 5–13)
NEUTS SEG # BLD: 3.46 K/UL (ref 1.8–8)
NEUTS SEG NFR BLD: 72.1 % (ref 32–75)
NRBC # BLD: 0 K/UL (ref 0–0.01)
NRBC BLD-RTO: 0 PER 100 WBC
NT PRO BNP: 392 PG/ML
PLATELET # BLD AUTO: 286 K/UL (ref 150–400)
PMV BLD AUTO: 9.5 FL (ref 8.9–12.9)
POTASSIUM SERPL-SCNC: 3.8 MMOL/L (ref 3.5–5.1)
RBC # BLD AUTO: 4.13 M/UL (ref 3.8–5.2)
RBC MORPH BLD: ABNORMAL
RBC MORPH BLD: ABNORMAL
SODIUM SERPL-SCNC: 137 MMOL/L (ref 136–145)
WBC # BLD AUTO: 4.8 K/UL (ref 3.6–11)

## 2025-01-23 PROCEDURE — 83880 ASSAY OF NATRIURETIC PEPTIDE: CPT

## 2025-01-23 PROCEDURE — 6370000000 HC RX 637 (ALT 250 FOR IP): Performed by: STUDENT IN AN ORGANIZED HEALTH CARE EDUCATION/TRAINING PROGRAM

## 2025-01-23 PROCEDURE — 2500000003 HC RX 250 WO HCPCS: Performed by: STUDENT IN AN ORGANIZED HEALTH CARE EDUCATION/TRAINING PROGRAM

## 2025-01-23 PROCEDURE — 85025 COMPLETE CBC W/AUTO DIFF WBC: CPT

## 2025-01-23 PROCEDURE — 36415 COLL VENOUS BLD VENIPUNCTURE: CPT

## 2025-01-23 PROCEDURE — 6360000002 HC RX W HCPCS: Performed by: INTERNAL MEDICINE

## 2025-01-23 PROCEDURE — 80048 BASIC METABOLIC PNL TOTAL CA: CPT

## 2025-01-23 RX ORDER — FUROSEMIDE 20 MG/1
20 TABLET ORAL DAILY
Qty: 30 TABLET | Refills: 1 | Status: SHIPPED | OUTPATIENT
Start: 2025-01-23

## 2025-01-23 RX ORDER — METOPROLOL TARTRATE 25 MG/1
12.5 TABLET, FILM COATED ORAL 2 TIMES DAILY
Qty: 60 TABLET | Refills: 1 | Status: SHIPPED | OUTPATIENT
Start: 2025-01-23

## 2025-01-23 RX ORDER — FUROSEMIDE 10 MG/ML
40 INJECTION INTRAMUSCULAR; INTRAVENOUS ONCE
Status: COMPLETED | OUTPATIENT
Start: 2025-01-23 | End: 2025-01-23

## 2025-01-23 RX ORDER — LOSARTAN POTASSIUM 25 MG/1
25 TABLET ORAL DAILY
Qty: 30 TABLET | Refills: 0 | Status: SHIPPED | OUTPATIENT
Start: 2025-01-23

## 2025-01-23 RX ADMIN — FUROSEMIDE 40 MG: 10 INJECTION, SOLUTION INTRAMUSCULAR; INTRAVENOUS at 09:58

## 2025-01-23 RX ADMIN — APIXABAN 5 MG: 5 TABLET, FILM COATED ORAL at 12:44

## 2025-01-23 RX ADMIN — METOPROLOL TARTRATE 12.5 MG: 25 TABLET, FILM COATED ORAL at 09:56

## 2025-01-23 RX ADMIN — DULOXETINE HYDROCHLORIDE 30 MG: 30 CAPSULE, DELAYED RELEASE ORAL at 09:56

## 2025-01-23 RX ADMIN — SODIUM CHLORIDE, PRESERVATIVE FREE 10 ML: 5 INJECTION INTRAVENOUS at 09:58

## 2025-01-23 RX ADMIN — PANTOPRAZOLE SODIUM 40 MG: 40 TABLET, DELAYED RELEASE ORAL at 06:44

## 2025-01-23 RX ADMIN — GUAIFENESIN 600 MG: 600 TABLET ORAL at 09:56

## 2025-01-23 NOTE — PROGRESS NOTES
Home Oxygen Test  Date of test: 1/23/2025  Time of test: 1245    Sa02 92 % on room air AT REST.  Sa02 91 % on room air DURING AMBULATION.  Sa02 N/A % on N/A Liters DURING AMBULATION.  Sa02 90 % on Room Air AT REST/AFTER AMBULATION.

## 2025-01-23 NOTE — DISCHARGE INSTRUCTIONS
Patient Discharge Instructions    Aide Bansal / [id number] : 1960    Admitted 2025 Discharged: 2025         DISCHARGE DIAGNOSIS:   Acute hypoxic respiratory failure POA requiring oxygen supplementation  Severe sepsis-tachycardia plus lactic acidosis  Influenza A  Atrial fibrillation in RVR  Orthostatic hypotension   Diastolic CHF  Hypokalemia- resolved   Thrombocytopenia- improving   Diarrhea  HLD-Continue with statin   GERD  MDD, SARAH  Insomnia  Obesity class III by BMI 48.42      Take Home Medications     {Medication reconciliation information is now added to the patient's AVS automatically when it is printed.  There is no need to use this SmartLink in discharge instructions.  Highlight this text and delete it to clear this message}      General drug facts     If you have a very bad allergy, wear an allergy ID at all times.   It is important that you take the medication exactly as they are prescribed.   Keep your medication in the bottles provided by the pharmacist.  Keep a list of all your drugs (prescription, natural products, vitamins, OTC) with you. Give this list to your doctor.  Do not take other medications without consulting your doctor.    Do not share your drugs with others and do not take anyone else's drugs.   Keep all drugs out of the reach of children and pets.    Most drugs may be thrown away in household trash after mixing with coffee grounds or carol ann litter and sealing in a plastic bag.    Keep a list Call your doctor for help with any side effects. If in the [place] you may also call the FDA at 5-067-FDA-2493    Talk with the doctor before starting any new drug, including OTC, natural products, or vitamins.        What to do at Home    1. Recommended diet: Low salt     2. Recommended activity: activity as tolerated    3. If you experience any of the following symptoms then please call your primary care physician or return to the emergency room if you cannot get hold of your

## 2025-01-23 NOTE — PROGRESS NOTES
Attempted to schedule a PCP hospital follow up. Unable to reach the office and unable to leave a voicemail. [facility] placed [facility] information AVS for patient resource. Pending patient discharge. Saba Cornell, Care Management Assistant

## 2025-01-23 NOTE — PROGRESS NOTES
1600: PT d/c order noted. PT not requiring oxygen at this time, as she passed her O2 challenge. [employer] organized for patient. IV and telemetry removed at this time. DC paperwork printed and reviewed with patient. All questions answered. Pt wheeled down to ride share at this time.

## 2025-01-23 NOTE — DISCHARGE SUMMARY
Discharge Summary    Name: Aide Bansal  [id number]  YOB: 1960 (Age: 64 y.o.)   Date of Admission: 1/16/2025  Date of Discharge: 1/23/2025  Attending Physician: Lizandro Brown MD    Discharge Diagnosis:     Acute hypoxic respiratory failure POA requiring oxygen supplementation  Severe sepsis-tachycardia plus lactic acidosis  Influenza A  Atrial fibrillation in RVR  Orthostatic hypotension   Diastolic CHF  Hypokalemia- resolved   Thrombocytopenia- improving   Diarrhea  HLD-Continue with statin   GERD  MDD, SARAH  Insomnia  Obesity class III by BMI 48.42    Consultations:  IP CONSULT TO CARDIOLOGY  IP CONSULT TO SPIRITUAL SERVICES      Brief Admission History/Reason for Admission Per Edmar Sandoval, DO:   Aide Bansal is a 64 y.o.  female with PMHx as listed below returns to emergency department following evaluation 2 days prior during which time she was diagnosed with influenza A.  Reports since that time she has become more dyspneic with worsening malaise, myalgias/arthralgias, fever/chills and poor p.o. intake.  Has not been on Tamiflu.  Denies prior pulmonary history.  ROS otherwise negative.  Denies tobacco, alcohol, illicit drugs.  Reports she does not wear CPAP, but is in the process of obtaining machine.     In the ED, patient with elevated temperature of 99.8 °F, tachycardic to 140s (A-fib RVR), soft blood pressures 90s/70s, saturating low 80s on 4 L supplemental oxygen requiring 5 L to maintain saturations in low 90s.  CXR demonstrates interstitial pulmonary edema pattern.  Labs demonstrate: Point-of-care lactic 2.16 => 2.61, procalcitonin 0.29, WBC 2.9, hemoglobin 13.9, platelets 118, sodium 131, potassium 2.9, glucose 125, BUN 24, creatinine 1.76 (baseline 1.0), total bilirubin 0.4, ALT 42, AST 92, alkaline phosphatase 132, albumin 3.4, lipase 176, BNP 1241 (no priors),-troponin 16.  Patient given Tamiflu, 4 L normal saline by ED provider.       Brief

## 2025-01-23 NOTE — PROGRESS NOTES
Comprehensive Nutrition Assessment    Type and Reason for Visit:  Initial, LOS    Nutrition Recommendations/Plan:   Continue current diet  Encourage PO intakes, honor preferences as able, provide assistance PRN  Monitor and record PO intakes and Bms in I/Os     Malnutrition Assessment:  Malnutrition Status:  At risk for malnutrition (01/23/25 2914)    Context:  Chronic Illness     Findings of the 6 clinical characteristics of malnutrition:  Energy Intake:  Mild decrease in energy intake  Weight Loss:  10% over 6 months     Body Fat Loss:  No body fat loss     Muscle Mass Loss:  No muscle mass loss    Fluid Accumulation:  No fluid accumulation     Strength:  Not Performed    Nutrition Assessment:    Admitted for influenza A. PMHx includes cervical cancer, dyslipidemia, fibromyalgia, GERD, HTN. Screened for LOS. Pt with varied documented intakes, generally ~50%. She reports a fair appetite and that she ordered a turkey wrap for lunch. Politely declined ONS r/t likely going home today. No additional preferences at this time. Did note -9.8% wt loss x6 months per EMR wt hx. Plan to continue current diet, encourage intakes.    Nutrition Related Findings:    Labs: Na 137, K 3.8, BUN 7, Creat 0.74, Gluc 90. Meds: furosemide, hydrochlorothiazide, rosuvastatin. 2+ generalized, b/l UE edema. BM 1/21. Wound Type: None       Current Nutrition Intake & Therapies:    Average Meal Intake: 26-50%, 51-75%  Average Supplements Intake: None Ordered  DIET ONE TIME MESSAGE;  ADULT DIET; Regular    Anthropometric Measures:  Height: 167.6 cm (5' 5.98\")  Ideal Body Weight (IBW): 130 lbs (59 kg)    Current Body Weight: 135.8 kg (299 lb 6.2 oz), 230.3 % IBW. Weight Source: Bed scale  Current BMI (kg/m2): 48.3  BMI Categories: Obese Class 3 (BMI 40.0 or greater)    Estimated Daily Nutrient Needs:  Energy Requirements Based On: Kcal/kg  Weight Used for Energy Requirements: Adjusted  Energy (kcal/day): 1958kcal (25kcal/kg)  Weight Used for

## 2025-01-23 NOTE — PLAN OF CARE
Problem: Discharge Planning  Goal: Discharge to home or other facility with appropriate resources  Outcome: Progressing     Problem: Skin/Tissue Integrity  Goal: Absence of new skin breakdown  Description: 1.  Monitor for areas of redness and/or skin breakdown  2.  Assess vascular access sites hourly  3.  Every 4-6 hours minimum:  Change oxygen saturation probe site  4.  Every 4-6 hours:  If on nasal continuous positive airway pressure, respiratory therapy assess nares and determine need for appliance change or resting period.  Outcome: Progressing     Problem: Neurosensory - Adult  Goal: Achieves stable or improved neurological status  Outcome: Progressing     
  Problem: Neurosensory - Adult  Goal: Achieves stable or improved neurological status  1/21/2025 2203 by Yumi Starkey RN  Outcome: Progressing     
  Problem: Occupational Therapy - Adult  Goal: By Discharge: Performs self-care activities at highest level of function for planned discharge setting.  See evaluation for individualized goals.  Description: FUNCTIONAL STATUS PRIOR TO ADMISSION:  reports independence with mobility and ADLS  Receives Help From: Family,  ,HOME SUPPORT: Patient lived with  and 13 year old granddaughter whom cares for.    Occupational Therapy Goals:  Initiated 1/18/2025  1.  Patient will perform grooming with Supervision within 7 day(s).  2.  Patient will perform lower body dressing with Supervision using AE within 7 day(s).  3.  Patient will perform toileting with Supervision within 7 day(s).  4.  Patient will perform toilet transfers with Supervision  within 7 day(s).  5.  Patient will perform sponge bathing with Supervision within 7 day(s).      Outcome: Adequate for Discharge    OCCUPATIONAL THERAPY TREATMENT/DISCHARGE  Patient: Aide Bansal (64 y.o. female)  Date: 1/21/2025  Primary Diagnosis: Paroxysmal atrial fibrillation (HCC) [I48.0]  Influenza A [J10.1]  Atrial fibrillation with RVR (HCC) [I48.91]  Acute respiratory failure with hypoxia [J96.01]       Precautions: Fall Risk, Bed Alarm (r/o  c-diff, flu)                  Chart, occupational therapy assessment, plan of care, and goals were reviewed.    ASSESSMENT  Patient continues with skilled OT services and has progressed towards goals.  Pt tolerated OT session well. Nursing weaned to 1L just prior to OT session, with slight desaturation to 88% SpO2 post functional ambulation, recovering to 91% with seated rest break and PLB. Pt is presenting at a grossly mod (I) level for ADLs and functional mobility, endorses ambulating to bathroom without assist, with no difficulty managing toileting tasks. Pt educated on fall risk and recommendation to call nursing prior to getting up to decreased risk of falls. Anticipate pt will continue progressing towards baseline with 
  Problem: Physical Therapy - Adult  Goal: By Discharge: Performs mobility at highest level of function for planned discharge setting.  See evaluation for individualized goals.  Description: FUNCTIONAL STATUS PRIOR TO ADMISSION: Patient was modified independent using a single point cane for functional mobility.    HOME SUPPORT PRIOR TO ADMISSION: The patient lived with  and 14 y/o granddaughter and did require assistance.    Physical Therapy Goals  Initiated 1/18/2025  1.  Patient will move from supine to sit and sit to supine, scoot up and down, and roll side to side in bed with modified independence within 7 day(s).    2.  Patient will perform sit to stand with modified independence within 7 day(s).  3.  Patient will transfer from bed to chair and chair to bed with modified independence using the least restrictive device within 7 day(s).  4.  Patient will ambulate with modified independence for 150 feet with the least restrictive device within 7 day(s).   5.  Patient will ascend/descend 12 stairs with L handrail(s) with contact guard assist within 7 day(s).   Outcome: Adequate for Discharge   PHYSICAL THERAPY TREATMENT/DISCHARGE    Patient: Aide Bansal (64 y.o. female)  Date: 1/21/2025  Diagnosis: Paroxysmal atrial fibrillation (HCC) [I48.0]  Influenza A [J10.1]  Atrial fibrillation with RVR (HCC) [I48.91]  Acute respiratory failure with hypoxia [J96.01] Influenza A      Precautions: Fall Risk, Bed Alarm (r/o  c-diff, flu)                      ASSESSMENT:  Patient has been followed by skilled PT services and has progressed towards goals. She was received in bed, reporting a headache and just returning to bed, but agreeable to session.  Pt endorses she has been up and moving ad giorgio today, and instructed to have nursing staff with her due to her many attachments.  Overall she demonstrates mobility at mod I to S.  Gait steady with no lob noted.  She states she feels as if she is moving as she normally does, 
  Problem: Safety - Adult  Goal: Free from fall injury  1/18/2025 0038 by Akash Britton, RN  Outcome: Progressing  Flowsheets (Taken 1/17/2025 1700 by Ruth Moser, RN)  Free From Fall Injury: Instruct family/caregiver on patient safety  1/17/2025 1546 by Ruth Moser RN  Outcome: Progressing     Problem: Discharge Planning  Goal: Discharge to home or other facility with appropriate resources  1/18/2025 0038 by Akash Britton, RN  Outcome: Progressing  1/17/2025 1546 by Ruth Moser RN  Outcome: Progressing     
  Problem: Safety - Adult  Goal: Free from fall injury  1/18/2025 0826 by Marsha Holliday RN  Outcome: Not Progressing  1/18/2025 0038 by Akash Britton RN  Outcome: Progressing  Flowsheets (Taken 1/17/2025 1700 by Ruth Moser, RN)  Free From Fall Injury: Instruct family/caregiver on patient safety     Problem: Discharge Planning  Goal: Discharge to home or other facility with appropriate resources  1/18/2025 0826 by Marsha Holliday RN  Outcome: Not Progressing  1/18/2025 0038 by Akash Britton RN  Outcome: Progressing     Problem: Safety - Adult  Goal: Free from fall injury  1/18/2025 0826 by Marsha Holliday RN  Outcome: Not Progressing  1/18/2025 0038 by Akash Britton RN  Outcome: Progressing  Flowsheets (Taken 1/17/2025 1700 by Ruth Moser, RN)  Free From Fall Injury: Instruct family/caregiver on patient safety     Problem: Discharge Planning  Goal: Discharge to home or other facility with appropriate resources  1/18/2025 0826 by Marsha Holliday RN  Outcome: Not Progressing  1/18/2025 0038 by Akash Britton RN  Outcome: Progressing     
  Problem: Safety - Adult  Goal: Free from fall injury  1/19/2025 0753 by Marsha Holliday RN  Outcome: Progressing  1/18/2025 2156 by Akash Britton, RN  Outcome: Progressing     Problem: Discharge Planning  Goal: Discharge to home or other facility with appropriate resources  Outcome: Progressing     Problem: Skin/Tissue Integrity  Goal: Absence of new skin breakdown  Description: 1.  Monitor for areas of redness and/or skin breakdown  2.  Assess vascular access sites hourly  3.  Every 4-6 hours minimum:  Change oxygen saturation probe site  4.  Every 4-6 hours:  If on nasal continuous positive airway pressure, respiratory therapy assess nares and determine need for appliance change or resting period.  1/19/2025 0753 by Marsha Holliday, RN  Outcome: Progressing  1/18/2025 2156 by Akash Britton, RN  Outcome: Progressing     
  Problem: Safety - Adult  Goal: Free from fall injury  1/23/2025 0827 by Marsha Holliday RN  Outcome: Progressing  1/22/2025 2343 by Yumi Starkey RN  Outcome: Progressing     Problem: Discharge Planning  Goal: Discharge to home or other facility with appropriate resources  1/23/2025 0827 by Marsha Holliday RN  Outcome: Progressing  1/22/2025 2343 by Yumi Starkey RN  Outcome: Progressing     Problem: Skin/Tissue Integrity  Goal: Absence of new skin breakdown  Description: 1.  Monitor for areas of redness and/or skin breakdown  2.  Assess vascular access sites hourly  3.  Every 4-6 hours minimum:  Change oxygen saturation probe site  4.  Every 4-6 hours:  If on nasal continuous positive airway pressure, respiratory therapy assess nares and determine need for appliance change or resting period.  1/23/2025 0827 by Marsha Holliday RN  Outcome: Progressing  1/22/2025 2343 by Yumi Starkey RN  Outcome: Progressing     Problem: Neurosensory - Adult  Goal: Achieves stable or improved neurological status  1/23/2025 0827 by Marsha Holliday RN  Outcome: Progressing  1/22/2025 2343 by Yumi Starkey RN  Outcome: Progressing  Goal: Absence of seizures  1/23/2025 0827 by Marsha Holliday RN  Outcome: Progressing  1/22/2025 2343 by Yumi Starkey RN  Outcome: Progressing  Goal: Remains free of injury related to seizures activity  1/23/2025 0827 by Marsha Holliday RN  Outcome: Progressing  1/22/2025 2343 by Yumi Starkey RN  Outcome: Progressing  Goal: Achieves maximal functionality and self care  1/23/2025 0827 by Marsha Holliday RN  Outcome: Progressing  1/22/2025 2343 by Yumi Starkey RN  Outcome: Progressing     Problem: Respiratory - Adult  Goal: Achieves optimal ventilation and oxygenation  1/23/2025 0827 by Marsha Holliday RN  Outcome: Progressing  1/22/2025 2343 by Yumi Starkey RN  Outcome: Progressing     Problem: Cardiovascular - Adult  Goal: Maintains optimal cardiac output and hemodynamic stability  1/23/2025 0827 by 
  Problem: Safety - Adult  Goal: Free from fall injury  Outcome: Progressing     Problem: Discharge Planning  Goal: Discharge to home or other facility with appropriate resources  1/21/2025 2203 by Yumi Starkey RN  Outcome: Progressing  1/21/2025 0839 by Juan Burns RN  Outcome: Progressing     Problem: Physical Therapy - Adult  Goal: By Discharge: Performs mobility at highest level of function for planned discharge setting.  See evaluation for individualized goals.  Description: FUNCTIONAL STATUS PRIOR TO ADMISSION: Patient was modified independent using a single point cane for functional mobility.    HOME SUPPORT PRIOR TO ADMISSION: The patient lived with  and 12 y/o granddaughter and did require assistance.    Physical Therapy Goals  Initiated 1/18/2025  1.  Patient will move from supine to sit and sit to supine, scoot up and down, and roll side to side in bed with modified independence within 7 day(s).    2.  Patient will perform sit to stand with modified independence within 7 day(s).  3.  Patient will transfer from bed to chair and chair to bed with modified independence using the least restrictive device within 7 day(s).  4.  Patient will ambulate with modified independence for 150 feet with the least restrictive device within 7 day(s).   5.  Patient will ascend/descend 12 stairs with L handrail(s) with contact guard assist within 7 day(s).   1/21/2025 1303 by Frida Samuel, PT  Outcome: Adequate for Discharge     Problem: Occupational Therapy - Adult  Goal: By Discharge: Performs self-care activities at highest level of function for planned discharge setting.  See evaluation for individualized goals.  Description: FUNCTIONAL STATUS PRIOR TO ADMISSION:  reports independence with mobility and ADLS  Receives Help From: Family,  ,HOME SUPPORT: Patient lived with  and 13 year old granddaughter whom cares for.    Occupational Therapy Goals:  Initiated 1/18/2025  1.  Patient will perform 
  Problem: Safety - Adult  Goal: Free from fall injury  Outcome: Progressing     Problem: Discharge Planning  Goal: Discharge to home or other facility with appropriate resources  Outcome: Progressing     
  Problem: Safety - Adult  Goal: Free from fall injury  Outcome: Progressing     Problem: Discharge Planning  Goal: Discharge to home or other facility with appropriate resources  Outcome: Progressing     Problem: Skin/Tissue Integrity  Goal: Absence of new skin breakdown  Description: 1.  Monitor for areas of redness and/or skin breakdown  2.  Assess vascular access sites hourly  3.  Every 4-6 hours minimum:  Change oxygen saturation probe site  4.  Every 4-6 hours:  If on nasal continuous positive airway pressure, respiratory therapy assess nares and determine need for appliance change or resting period.  1/22/2025 2343 by Yumi Starkey, RN  Outcome: Progressing  1/22/2025 1054 by Syl Stanford, RN  Outcome: Progressing     Problem: Neurosensory - Adult  Goal: Achieves stable or improved neurological status  Outcome: Progressing  Goal: Absence of seizures  Outcome: Progressing  Goal: Remains free of injury related to seizures activity  Outcome: Progressing  Goal: Achieves maximal functionality and self care  Outcome: Progressing     Problem: Respiratory - Adult  Goal: Achieves optimal ventilation and oxygenation  Outcome: Progressing     Problem: Cardiovascular - Adult  Goal: Maintains optimal cardiac output and hemodynamic stability  Outcome: Progressing  Goal: Absence of cardiac dysrhythmias or at baseline  Outcome: Progressing     Problem: Skin/Tissue Integrity - Adult  Goal: Skin integrity remains intact  Outcome: Progressing  Goal: Incisions, wounds, or drain sites healing without S/S of infection  Outcome: Progressing  Goal: Oral mucous membranes remain intact  Outcome: Progressing     Problem: Musculoskeletal - Adult  Goal: Return mobility to safest level of function  Outcome: Progressing  Goal: Maintain proper alignment of affected body part  Outcome: Progressing  Goal: Return ADL status to a safe level of function  Outcome: Progressing     Problem: Gastrointestinal - Adult  Goal: Minimal or absence of 
  Problem: Safety - Adult  Goal: Free from fall injury  Outcome: Progressing     Problem: Discharge Planning  Goal: Discharge to home or other facility with appropriate resources  Outcome: Progressing     Problem: Skin/Tissue Integrity  Goal: Absence of new skin breakdown  Description: 1.  Monitor for areas of redness and/or skin breakdown  2.  Assess vascular access sites hourly  3.  Every 4-6 hours minimum:  Change oxygen saturation probe site  4.  Every 4-6 hours:  If on nasal continuous positive airway pressure, respiratory therapy assess nares and determine need for appliance change or resting period.  Outcome: Progressing     
  Problem: Skin/Tissue Integrity  Goal: Absence of new skin breakdown  Description: 1.  Monitor for areas of redness and/or skin breakdown  2.  Assess vascular access sites hourly  3.  Every 4-6 hours minimum:  Change oxygen saturation probe site  4.  Every 4-6 hours:  If on nasal continuous positive airway pressure, respiratory therapy assess nares and determine need for appliance change or resting period.  1/21/2025 2203 by Yumi Starkey RN  Outcome: Progressing     
Problem: Occupational Therapy - Adult  Goal: By Discharge: Performs self-care activities at highest level of function for planned discharge setting.  See evaluation for individualized goals.  Description: FUNCTIONAL STATUS PRIOR TO ADMISSION:  reports independence with mobility and ADLS  Receives Help From: Family,  ,HOME SUPPORT: Patient lived with  and 13 year old granddaughter whom cares for.    Occupational Therapy Goals:  Initiated 1/18/2025  1.  Patient will perform grooming with Supervision within 7 day(s).  2.  Patient will perform lower body dressing with Supervision using AE within 7 day(s).  3.  Patient will perform toileting with Supervision within 7 day(s).  4.  Patient will perform toilet transfers with Supervision  within 7 day(s).  5.  Patient will perform sponge bathing with Supervision within 7 day(s).      Outcome: Not Progressing   OCCUPATIONAL THERAPY EVALUATION    Patient: Aide Bansal (64 y.o. female)  Date: 1/18/2025  Primary Diagnosis: Paroxysmal atrial fibrillation (HCC) [I48.0]  Influenza A [J10.1]  Atrial fibrillation with RVR (HCC) [I48.91]  Acute respiratory failure with hypoxia [J96.01]         Precautions: Fall Risk, Bed Alarm (r/o  c-diff, flu)    ASSESSMENT :  The patient is limited by decreased functional mobility, independence in ADLs, strength, activity tolerance, coordination, orthostatic hypotension and pain/excoriation in zane/anal areas due to incontinence.    Based on the impairments listed above pt was semi fowlers in bed upon arrival with low BP.  She was incontinent of BM and also had purwick.  Total assist needed for zane care at bed level and pain reported due to excoriation.  Barrier cream applied.  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  CGA needed for bed mobility and pt was able to bridge in bed.  Unable to progress past seated edge of bed scooting (pt nearly came to standing with CGA) due to 
see below. Pt started with soft BP but was orthostatic with activity which limited evaluation. Pt engaged in bed mobility training. Pt directed in rolling B to assist with hygiene. Pt rolls with CGA. Pt then directed in supine to sit with CGA. Pt requires increased time. Pt reports feeling lightheaded sitting EOB. Pt's BP dropped and pt directed in ankle pumps and knee extension to attempt in raising BP prior to discontinuing activity. Pt's BP continue to drop. Pt directed in scooting R laterally EOB towards HOB. Pt completed with CGA with good weightbearing through B Les. Pt then returned to supine requiring min A for LE management. Pt sitting up in bed at end of session to eat breakfast.     Vitals:    01/18/25 1026 01/18/25 1029 01/18/25 1031 01/18/25 1035   BP: 100/69 (!) 84/51 (!) 67/54 93/61   Pulse: 100 (!) 120 (!) 107 (!) 113   Position: supine sitting Sitting after LE there ex supine   SpO2: 94% 93% 96% 96%      Based on the impairments listed above pt requires CGA-[place] for bed mobility.    Patient will benefit from skilled intervention to address the above impairments.    Functional Outcome Measure:  The patient scored 11/24 on the AM-PAC outcome measure which is indicative of pt can benefit from further therapy upon discharge.           PLAN :  Recommendations and Planned Interventions:   bed mobility training, transfer training, gait training, therapeutic exercises, neuromuscular re-education, patient and family training/education, and therapeutic activities    Frequency/Duration: Patient will be followed by physical therapy to address goals, PT Plan of Care: 4 times/week to address goals.    Recommend for next PT session: bed to bedside chair transfers    Recommendation for discharge: (in order for the patient to meet his/her long term goals):   Intermittent physical therapy up to 2-3x/week in previous living setting pending progress    Other factors to consider for discharge: high risk for falls, not 
within 7 day(s).  2.  Patient will perform lower body dressing with Supervision using AE within 7 day(s).  3.  Patient will perform toileting with Supervision within 7 day(s).  4.  Patient will perform toilet transfers with Supervision  within 7 day(s).  5.  Patient will perform sponge bathing with Supervision within 7 day(s).      1/18/2025 1433 by Maria Fernanda Mchugh, OTR/L  Outcome: Not Progressing

## 2025-01-23 NOTE — CARE COORDINATION
Pt is cleared for d/c from a CM standpoint.     Transition of Care Plan:    RUR: 12%   Prior Level of Functioning: independent  Disposition: home with family   ELISABET: 1/23/25  If SNF or IPR: Date FOC offered:   Date FOC received:   Accepting facility:   Date authorization started with reference number:   Date authorization received and expires:   Follow up appointments:   DME needed: home o2  Transportation at discharge: spouse  IM/IMM Medicare/ letter given: provided 1/23/25  Is patient a [place] and connected with [facility]?    If yes, was  transfer form completed and [facility] notified?   Caregiver Contact: ?Sabino Bansal [phone]  Discharge Caregiver contacted prior to discharge?   Care Conference needed?   Barriers to discharge: home o2    CM attended IDRs.  Pt may need home o2.  CM spoke with pt's nurse to request o2 challenge.    1:28 p.m. Pt will not qualify for home o2 based on challenge. CM verified with [employer] and o2 has to be below 88%. CM spoke with pt who requested a Medicaid ride home. CM will arrange. CM called [employer] and pt does not have an insurance benefits.     2:41 p.m. CM scheduled ride through [employer] and is waiting trip to be authorized.          01/23/25 1329   Services At/After Discharge   Transition of Care Consult (CM Consult) Discharge Planning   Services At/After Discharge None   Mode of Transport at Discharge Other (see comment)  (Medicaid transport)         Jackie Dickey LMSW, LCSW  Care Management, [facility]  [phone]

## 2025-02-16 PROBLEM — J10.1 INFLUENZA A: Status: RESOLVED | Noted: 2025-01-17 | Resolved: 2025-02-16

## 2025-02-28 ENCOUNTER — TRANSCRIBE ORDERS (OUTPATIENT)
Facility: HOSPITAL | Age: 65
End: 2025-02-28

## 2025-02-28 DIAGNOSIS — J96.90 RESPIRATORY FAILURE, UNSPECIFIED CHRONICITY, UNSPECIFIED WHETHER WITH HYPOXIA OR HYPERCAPNIA (HCC): Primary | ICD-10-CM

## 2025-05-09 ENCOUNTER — TRANSCRIBE ORDERS (OUTPATIENT)
Facility: HOSPITAL | Age: 65
End: 2025-05-09

## 2025-05-09 ENCOUNTER — HOSPITAL ENCOUNTER (OUTPATIENT)
Facility: HOSPITAL | Age: 65
Discharge: HOME | End: 2025-05-12
Payer: MEDICARE

## 2025-05-09 DIAGNOSIS — M25.512 LEFT SHOULDER PAIN, UNSPECIFIED CHRONICITY: Primary | ICD-10-CM

## 2025-05-09 DIAGNOSIS — M25.512 LEFT SHOULDER PAIN, UNSPECIFIED CHRONICITY: ICD-10-CM

## 2025-05-09 PROCEDURE — 73030 X-RAY EXAM OF SHOULDER: CPT

## 2025-06-11 ENCOUNTER — TRANSCRIBE ORDERS (OUTPATIENT)
Facility: HOSPITAL | Age: 65
End: 2025-06-11

## 2025-06-11 DIAGNOSIS — Z12.39 ENCOUNTER FOR SCREENING FOR MALIGNANT NEOPLASM OF BREAST, UNSPECIFIED SCREENING MODALITY: ICD-10-CM

## 2025-06-11 DIAGNOSIS — Z12.31 VISIT FOR SCREENING MAMMOGRAM: Primary | ICD-10-CM

## 2025-07-25 ENCOUNTER — TRANSCRIBE ORDERS (OUTPATIENT)
Facility: HOSPITAL | Age: 65
End: 2025-07-25

## 2025-07-25 DIAGNOSIS — Z12.31 VISIT FOR SCREENING MAMMOGRAM: Primary | ICD-10-CM

## (undated) DEVICE — SET ADMIN 16ML TBNG L100IN 2 Y INJ SITE IV PIGGY BK DISP

## (undated) DEVICE — MEDI-TRACE CADENCE ADULT, DEFIBRILLATION ELECTRODE -RTS  (10 PR/PK) - PHILIPS: Brand: MEDI-TRACE CADENCE

## (undated) DEVICE — BAG SPEC BIOHZRD 10 X 10 IN --

## (undated) DEVICE — SYR 10ML LUER LOK 1/5ML GRAD --

## (undated) DEVICE — SOLIDIFIER MEDC 1200ML -- CONVERT TO 356117

## (undated) DEVICE — BITE BLK ENDOSCP AD 54FR GRN POLYETH ENDOSCP W STRP SLD

## (undated) DEVICE — CATH IV AUTOGRD BC PNK 20GA 25 -- INSYTE

## (undated) DEVICE — GARMENT,MEDLINE,DVT,INT,CALF,MED, GEN2: Brand: MEDLINE

## (undated) DEVICE — KENDALL RADIOLUCENT FOAM MONITORING ELECTRODE RECTANGULAR SHAPE: Brand: KENDALL

## (undated) DEVICE — BASIN EMSIS 16OZ GRAPHITE PLAS KID SHP MOLD GRAD FOR ORAL

## (undated) DEVICE — MEDI-VAC YANK SUCT HNDL W/TPRD BULBOUS TIP: Brand: CARDINAL HEALTH

## (undated) DEVICE — CONTAINER SPEC 20 ML LID NEUT BUFF FORMALIN 10 % POLYPR STS

## (undated) DEVICE — NEONATAL-ADULT SPO2 SENSOR: Brand: NELLCOR

## (undated) DEVICE — Device

## (undated) DEVICE — NEEDLE HYPO 18GA L1.5IN PNK S STL HUB POLYPR SHLD REG BVL

## (undated) DEVICE — 1200 GUARD II KIT W/5MM TUBE W/O VAC TUBE: Brand: GUARDIAN

## (undated) DEVICE — FORCEPS BX L160CM DIA8MM GRSP DISECT CUP TIP NONLOCKING ROT

## (undated) DEVICE — FORCEPS BX L240CM JAW DIA2.8MM L CAP W/ NDL MIC MESH TOOTH

## (undated) DEVICE — STRAINER URIN CALC RNL MSH -- CONVERT TO ITEM 357634

## (undated) DEVICE — Z DISCONTINUED PER MEDLINE LINE GAS SAMPLING O2/CO2 LNG AD 13 FT NSL W/ TBNG FILTERLINE

## (undated) DEVICE — TOWEL 4 PLY TISS 19X30 SUE WHT

## (undated) DEVICE — SYR 3ML LL TIP 1/10ML GRAD --

## (undated) DEVICE — TUBING, SUCTION, 1/4" X 10', STRAIGHT: Brand: MEDLINE

## (undated) DEVICE — YANKAUER,BULB TIP,W/O VENT,RIGID,STERILE: Brand: MEDLINE

## (undated) DEVICE — BLOCK BITE ENDOSCP AD 21 MM W/ DIL BLU LF DISP